# Patient Record
Sex: MALE | NOT HISPANIC OR LATINO | Employment: OTHER | URBAN - METROPOLITAN AREA
[De-identification: names, ages, dates, MRNs, and addresses within clinical notes are randomized per-mention and may not be internally consistent; named-entity substitution may affect disease eponyms.]

---

## 2024-10-29 DIAGNOSIS — I50.22 CHRONIC SYSTOLIC HEART FAILURE: Primary | ICD-10-CM

## 2024-11-04 ENCOUNTER — APPOINTMENT (OUTPATIENT)
Dept: CARDIOLOGY | Facility: HOSPITAL | Age: 74
End: 2024-11-04
Payer: MEDICARE

## 2024-11-04 ENCOUNTER — OFFICE VISIT (OUTPATIENT)
Dept: CARDIOLOGY | Facility: HOSPITAL | Age: 74
End: 2024-11-04
Payer: MEDICARE

## 2024-11-04 ENCOUNTER — HOSPITAL ENCOUNTER (OUTPATIENT)
Dept: CARDIOLOGY | Facility: HOSPITAL | Age: 74
Discharge: HOME | End: 2024-11-04
Payer: MEDICARE

## 2024-11-04 ENCOUNTER — LAB (OUTPATIENT)
Dept: LAB | Facility: LAB | Age: 74
End: 2024-11-04
Payer: COMMERCIAL

## 2024-11-04 ENCOUNTER — PREP FOR PROCEDURE (OUTPATIENT)
Dept: CARDIOLOGY | Facility: HOSPITAL | Age: 74
End: 2024-11-04

## 2024-11-04 VITALS
BODY MASS INDEX: 21.6 KG/M2 | DIASTOLIC BLOOD PRESSURE: 72 MMHG | SYSTOLIC BLOOD PRESSURE: 115 MMHG | WEIGHT: 137.6 LBS | HEIGHT: 67 IN | OXYGEN SATURATION: 98 % | HEART RATE: 59 BPM

## 2024-11-04 DIAGNOSIS — I50.9 CONGESTIVE HEART FAILURE, UNSPECIFIED HF CHRONICITY, UNSPECIFIED HEART FAILURE TYPE: Primary | ICD-10-CM

## 2024-11-04 DIAGNOSIS — Z95.5 S/P DRUG ELUTING CORONARY STENT PLACEMENT: ICD-10-CM

## 2024-11-04 DIAGNOSIS — I25.10 CORONARY ARTERY DISEASE INVOLVING NATIVE CORONARY ARTERY OF NATIVE HEART WITHOUT ANGINA PECTORIS: ICD-10-CM

## 2024-11-04 DIAGNOSIS — Z01.818 PRE-OP TESTING: ICD-10-CM

## 2024-11-04 DIAGNOSIS — I50.22 CHRONIC SYSTOLIC HEART FAILURE: Primary | ICD-10-CM

## 2024-11-04 DIAGNOSIS — I25.10 CAD (CORONARY ARTERY DISEASE): Primary | ICD-10-CM

## 2024-11-04 DIAGNOSIS — I50.22 CHRONIC SYSTOLIC HEART FAILURE: ICD-10-CM

## 2024-11-04 DIAGNOSIS — E78.2 MIXED HYPERLIPIDEMIA: ICD-10-CM

## 2024-11-04 DIAGNOSIS — I71.21 ANEURYSM OF ASCENDING AORTA WITHOUT RUPTURE (CMS-HCC): ICD-10-CM

## 2024-11-04 DIAGNOSIS — I73.9 PERIPHERAL ARTERY DISEASE (CMS-HCC): ICD-10-CM

## 2024-11-04 LAB
ANION GAP SERPL CALC-SCNC: 10 MMOL/L (ref 10–20)
AORTIC VALVE MEAN GRADIENT: 3 MMHG
AORTIC VALVE PEAK VELOCITY: 1.23 M/S
AV PEAK GRADIENT: 6 MMHG
AVA (PEAK VEL): 3.01 CM2
AVA (VTI): 2.9 CM2
BUN SERPL-MCNC: 19 MG/DL (ref 6–23)
CALCIUM SERPL-MCNC: 8.8 MG/DL (ref 8.6–10.3)
CHLORIDE SERPL-SCNC: 97 MMOL/L (ref 98–107)
CO2 SERPL-SCNC: 33 MMOL/L (ref 21–32)
CREAT SERPL-MCNC: 1.04 MG/DL (ref 0.5–1.3)
EGFRCR SERPLBLD CKD-EPI 2021: 76 ML/MIN/1.73M*2
EJECTION FRACTION APICAL 4 CHAMBER: 25.3
EJECTION FRACTION: 29 %
ERYTHROCYTE [DISTWIDTH] IN BLOOD BY AUTOMATED COUNT: 18.5 % (ref 11.5–14.5)
GLUCOSE SERPL-MCNC: 101 MG/DL (ref 74–99)
HCT VFR BLD AUTO: 33.4 % (ref 41–52)
HGB BLD-MCNC: 9.8 G/DL (ref 13.5–17.5)
LEFT ATRIUM VOLUME AREA LENGTH INDEX BSA: 54.2 ML/M2
LEFT VENTRICLE INTERNAL DIMENSION DIASTOLE: 5.69 CM (ref 3.5–6)
LEFT VENTRICULAR OUTFLOW TRACT DIAMETER: 2.2 CM
MCH RBC QN AUTO: 23.1 PG (ref 26–34)
MCHC RBC AUTO-ENTMCNC: 29.3 G/DL (ref 32–36)
MCV RBC AUTO: 79 FL (ref 80–100)
MITRAL VALVE E/A RATIO: 2.34
NRBC BLD-RTO: 0 /100 WBCS (ref 0–0)
PLATELET # BLD AUTO: 380 X10*3/UL (ref 150–450)
POTASSIUM SERPL-SCNC: 3.2 MMOL/L (ref 3.5–5.3)
RBC # BLD AUTO: 4.25 X10*6/UL (ref 4.5–5.9)
RIGHT VENTRICLE FREE WALL PEAK S': 13.5 CM/S
RIGHT VENTRICLE PEAK SYSTOLIC PRESSURE: 74.9 MMHG
SODIUM SERPL-SCNC: 137 MMOL/L (ref 136–145)
TRICUSPID ANNULAR PLANE SYSTOLIC EXCURSION: 2 CM
WBC # BLD AUTO: 5.3 X10*3/UL (ref 4.4–11.3)

## 2024-11-04 PROCEDURE — 93306 TTE W/DOPPLER COMPLETE: CPT

## 2024-11-04 PROCEDURE — 3008F BODY MASS INDEX DOCD: CPT | Performed by: INTERNAL MEDICINE

## 2024-11-04 PROCEDURE — 93010 ELECTROCARDIOGRAM REPORT: CPT | Performed by: INTERNAL MEDICINE

## 2024-11-04 PROCEDURE — 85027 COMPLETE CBC AUTOMATED: CPT

## 2024-11-04 PROCEDURE — 80048 BASIC METABOLIC PNL TOTAL CA: CPT

## 2024-11-04 PROCEDURE — 99214 OFFICE O/P EST MOD 30 MIN: CPT | Mod: 25 | Performed by: INTERNAL MEDICINE

## 2024-11-04 PROCEDURE — 1159F MED LIST DOCD IN RCRD: CPT | Performed by: INTERNAL MEDICINE

## 2024-11-04 PROCEDURE — 99204 OFFICE O/P NEW MOD 45 MIN: CPT | Performed by: INTERNAL MEDICINE

## 2024-11-04 PROCEDURE — 2500000004 HC RX 250 GENERAL PHARMACY W/ HCPCS (ALT 636 FOR OP/ED)

## 2024-11-04 RX ORDER — OMEPRAZOLE 40 MG/1
40 CAPSULE, DELAYED RELEASE ORAL DAILY
COMMUNITY
Start: 2024-07-30

## 2024-11-04 RX ORDER — FINASTERIDE 5 MG/1
5 TABLET, FILM COATED ORAL DAILY
COMMUNITY
Start: 2024-10-21

## 2024-11-04 RX ORDER — BISMUTH SUBSALICYLATE 262 MG
1 TABLET,CHEWABLE ORAL DAILY
COMMUNITY

## 2024-11-04 RX ORDER — ESCITALOPRAM OXALATE 20 MG/1
10 TABLET ORAL DAILY
COMMUNITY
Start: 2024-10-14

## 2024-11-04 RX ORDER — POTASSIUM CHLORIDE 750 MG/1
20 TABLET, EXTENDED RELEASE ORAL DAILY
COMMUNITY
Start: 2024-10-14 | End: 2024-11-09 | Stop reason: HOSPADM

## 2024-11-04 RX ORDER — BUDESONIDE 3 MG/1
3 CAPSULE, COATED PELLETS ORAL DAILY
COMMUNITY
Start: 2024-10-28 | End: 2024-11-09 | Stop reason: HOSPADM

## 2024-11-04 RX ORDER — ALPRAZOLAM 0.5 MG/1
0.5 TABLET ORAL 3 TIMES DAILY PRN
COMMUNITY
Start: 2024-09-26

## 2024-11-04 RX ORDER — WATER
100 LIQUID (ML) MISCELLANEOUS
Status: CANCELLED | OUTPATIENT
Start: 2024-11-04

## 2024-11-04 RX ORDER — CLOPIDOGREL BISULFATE 75 MG/1
75 TABLET ORAL DAILY
COMMUNITY
Start: 2024-08-28

## 2024-11-04 RX ORDER — ACETAMINOPHEN 500 MG
2000 TABLET ORAL DAILY
COMMUNITY

## 2024-11-04 RX ORDER — CALCIUM CARBONATE 200(500)MG
1 TABLET,CHEWABLE ORAL DAILY
Status: ON HOLD | COMMUNITY
End: 2024-11-07 | Stop reason: WASHOUT

## 2024-11-04 RX ORDER — ATORVASTATIN CALCIUM 80 MG/1
80 TABLET, FILM COATED ORAL DAILY
COMMUNITY
Start: 2024-10-14

## 2024-11-04 RX ORDER — NITROGLYCERIN 0.4 MG/1
1 TABLET SUBLINGUAL EVERY 5 MIN PRN
COMMUNITY
Start: 2024-08-14

## 2024-11-04 RX ORDER — METOPROLOL SUCCINATE 25 MG/1
25 TABLET, EXTENDED RELEASE ORAL DAILY
COMMUNITY
End: 2024-11-09 | Stop reason: HOSPADM

## 2024-11-04 RX ORDER — FAMOTIDINE 40 MG/1
40 TABLET, FILM COATED ORAL DAILY
COMMUNITY
Start: 2024-10-07

## 2024-11-04 RX ORDER — ACETAMINOPHEN 500 MG
5 TABLET ORAL NIGHTLY
COMMUNITY

## 2024-11-04 RX ORDER — TAMSULOSIN HYDROCHLORIDE 0.4 MG/1
0.4 CAPSULE ORAL DAILY
COMMUNITY
Start: 2024-10-28

## 2024-11-04 NOTE — PROGRESS NOTES
Referred by brother Jose Waite   for Class III HF symptoms after PCI c/b LM-LAD dissection and MI at [place], [place].     History Of Present Illness:    Anurag Waite is a 74 y.o. male presenting for evaluation of Class 3 HF symptoms after LCX PCI attempt c/b LM-LAD dissection, MI, VT, cardiogenic shock.    PCP: Carmen Waite  Cardiologist: Carolee Brandt (CNP),     CRF: Hyperlipidemia, 55 pack-year smoking quit 7/2024, positive family history CAD/CABG (brother) and father MI x 2. Negative for DM or hypertension.    Complex prior CV history including PAD  and RLE claudication s/p femoral artery endarterectomy c/b large hematoma requiring re-exploration and repair.    History of ascending aortic aneurysm 4.0 cm by CT.    PCP performed CAC score for 10 year CV risk assessment >1500 consistent with high 10-year CV risk death, MI, stroke = 25%.    Referred for cardiac cath. August 2024: PCI of LCX  (2.25 x 16) c/b LM-LAD dissection requiring stent LM  (3.5 x 8), LM-LAD (3.5 x 48), LAD (3.25 x 8) with cardiogenic shock, IABP, VT and intubation for 24 hours. Post-PCI echo showed LVEF 40-45% with apical HK. Recurrent visits for HFrEF with torsemide 20 to 40 mg and metoprolol ER 25 mg only. No spironolactone, Entresto/ACEI/ARB or SGLT-2 inhibitors.    Presently, Class 3 from HFrEF symptom standpoint with SOB showering and dressing. Able to walk < 100 yards before SOB. Denies othopnea, PND. Pedal edema resolved with torsemide 20 to 40 mg daily. Denies angina, chest pain, palpitations, rapid heart beat, syncope/pre-syncope. Compliant with DAPT.     NKDA    Past surgical history: multiple hernia repairs and s/p right common femoral/profunda endarterectomy.    Past Medical History:  He has no past medical history on file.    Past Surgical History:  He has no past surgical history on file.      Social History:  He reports that he quit smoking about 3 months ago. His smoking use included  "cigarettes. He has never used smokeless tobacco. He reports current alcohol use of about 14.0 standard drinks of alcohol per week. He reports that he does not use drugs. 55 pack year smoking history.    Family History:  No family history on file.     Allergies:  Patient has no known allergies.    Outpatient Medications:  Current Outpatient Medications   Medication Instructions    ALPRAZolam (XANAX) 0.5 mg, 3 times daily PRN    atorvastatin (LIPITOR) 80 mg, Daily    budesonide EC (ENTOCORT EC) 3 mg, Daily    calcium carbonate (Tums) 200 mg calcium chewable tablet 1 tablet, Daily    cholecalciferol, vitamin D3, 25 mcg (1,000 unit) tablet,chewable 2 tablets, Daily    clopidogrel (PLAVIX) 75 mg, Daily    escitalopram (LEXAPRO) 10 mg, Daily    famotidine (PEPCID) 40 mg, Daily    finasteride (PROSCAR) 5 mg, Daily    Klor-Con M10 10 mEq ER tablet 10 mEq, Daily    melatonin 5 mg, Daily    metoprolol succinate XL (TOPROL-XL) 12.5 mg, 2 times daily    multivitamin tablet 1 tablet, Daily    nitroglycerin (Nitrostat) 0.4 mg SL tablet 1 tablet    omeprazole (PRILOSEC) 40 mg, Daily    tamsulosin (FLOMAX) 0.4 mg, Daily    torsemide (DEMADEX) 20 mg, Daily        Last Recorded Vitals:  Vitals:    11/04/24 0929   BP: 115/72   BP Location: Right arm   Patient Position: Sitting   BP Cuff Size: Small adult   Pulse: 59   SpO2: 98%   Weight: 62.4 kg (137 lb 9.6 oz)   Height: 1.702 m (5' 7\")       Physical Exam:  Skin: No rash.     HEENT: Non-palpable thyroid.     Lungs: Bibasilar rales.    Cardiac: JVP 8. PMI non-displaced. S1, S2. No S3. No S4. I/VI RUSSELL.     Abdomen: No hepatosplenomegaly. No abdominal bruit. Normal aortic impulse.    Extremities: No edema.    Pulses: Carotid 2+ bilateral. No carotid bruits. Radial 2+ bilateral. Femoral 2+ right, 2+ left. Popliteal 1+ bilateral. Posterior tibial 1+ bilateral.    Neuro: Non-focal. Normal gait.       Last Labs:  CBC -  No results found for: \"WBC\", \"HGB\", \"HCT\", \"MCV\", \"PLT\"    CMP -  No " "results found for: \"CALCIUM\", \"PHOS\", \"PROT\", \"ALBUMIN\", \"AST\", \"ALT\", \"ALKPHOS\", \"BILITOT\"    LIPID PANEL -   No results found for: \"CHOL\", \"HDL\", \"CHHDL\", \"LDL\", \"VLDL\", \"TRIG\", \"NHDL\"    RENAL FUNCTION PANEL -   No results found for: \"GLU\", \"K\", \"CHLOR\", \"PHOS\"    No results found for: \"BNP\", \"HGBA1C\"    Last Cardiology Tests:  ECG:  Review of the ECG by me shows sinus bradycardia. There is poor R-wave progression and anterolateral deep T-wave c/w known prior AMI complicating PCI, cannot rule out anterolateral ischemia.   Echo:  Scheduled for today post-visit.    Assessment/Plan   74 year-old man with multiple CRF presents for second opinion regarding Class III heart failure.  Complicated recent CV course with  August 2024 PCI of LCX  (2.25 x 16) c/b LM-LAD dissection requiring stent LM  (3.5 x 8), LM-LAD (3.5 x 48), LAD (3.25 x 8) with cardiogenic shock, IABP, VT and intubation for 24 hours. Post-PCI echo showed LVEF 40-45% with apical HK. Recurrent visits for HFrEF with torsemide 20 to 40 mg and metoprolol ER 25 mg only. No spironolactone, Entresto/ACEI/ARB or SGLT-2 inhibitors. Will require comprehensive HfrEF assessment now including TTE, right and left heart cath and admission for institution of GDMT with SGLT-2, Entresto and spironolactone. Reviewed PCI CD. Ostial LCX jailed with ostial LCX stensois, loss of ramus/high OM that fills via left-to-left collaterals. LM-LAD with good stent result. Dr. Sousa will cath Nov 6 and then admit to HF Service. Will follow-up with results of echo later today. Dr. Sousa will do telehealth visit tomorrow.      Abel Gutierrez MD   "

## 2024-11-04 NOTE — H&P (VIEW-ONLY)
Referred by brother Jose Waite   for Class III HF symptoms after PCI c/b LM-LAD dissection and MI at [place], [place].     History Of Present Illness:    Anurag Waite is a 74 y.o. male presenting for evaluation of Class 3 HF symptoms after LCX PCI attempt c/b LM-LAD dissection, MI, VT, cardiogenic shock.    PCP: Carmen Waite  Cardiologist: Carolee Brandt (CNP),     CRF: Hyperlipidemia, 55 pack-year smoking quit 7/2024, positive family history CAD/CABG (brother) and father MI x 2. Negative for DM or hypertension.    Complex prior CV history including PAD  and RLE claudication s/p femoral artery endarterectomy c/b large hematoma requiring re-exploration and repair.    History of ascending aortic aneurysm 4.0 cm by CT.    PCP performed CAC score for 10 year CV risk assessment >1500 consistent with high 10-year CV risk death, MI, stroke = 25%.    Referred for cardiac cath. August 2024: PCI of LCX  (2.25 x 16) c/b LM-LAD dissection requiring stent LM  (3.5 x 8), LM-LAD (3.5 x 48), LAD (3.25 x 8) with cardiogenic shock, IABP, VT and intubation for 24 hours. Post-PCI echo showed LVEF 40-45% with apical HK. Recurrent visits for HFrEF with torsemide 20 to 40 mg and metoprolol ER 25 mg only. No spironolactone, Entresto/ACEI/ARB or SGLT-2 inhibitors.    Presently, Class 3 from HFrEF symptom standpoint with SOB showering and dressing. Able to walk < 100 yards before SOB. Denies othopnea, PND. Pedal edema resolved with torsemide 20 to 40 mg daily. Denies angina, chest pain, palpitations, rapid heart beat, syncope/pre-syncope. Compliant with DAPT.     NKDA    Past surgical history: multiple hernia repairs and s/p right common femoral/profunda endarterectomy.    Past Medical History:  He has no past medical history on file.    Past Surgical History:  He has no past surgical history on file.      Social History:  He reports that he quit smoking about 3 months ago. His smoking use included  "cigarettes. He has never used smokeless tobacco. He reports current alcohol use of about 14.0 standard drinks of alcohol per week. He reports that he does not use drugs. 55 pack year smoking history.    Family History:  No family history on file.     Allergies:  Patient has no known allergies.    Outpatient Medications:  Current Outpatient Medications   Medication Instructions    ALPRAZolam (XANAX) 0.5 mg, 3 times daily PRN    atorvastatin (LIPITOR) 80 mg, Daily    budesonide EC (ENTOCORT EC) 3 mg, Daily    calcium carbonate (Tums) 200 mg calcium chewable tablet 1 tablet, Daily    cholecalciferol, vitamin D3, 25 mcg (1,000 unit) tablet,chewable 2 tablets, Daily    clopidogrel (PLAVIX) 75 mg, Daily    escitalopram (LEXAPRO) 10 mg, Daily    famotidine (PEPCID) 40 mg, Daily    finasteride (PROSCAR) 5 mg, Daily    Klor-Con M10 10 mEq ER tablet 10 mEq, Daily    melatonin 5 mg, Daily    metoprolol succinate XL (TOPROL-XL) 12.5 mg, 2 times daily    multivitamin tablet 1 tablet, Daily    nitroglycerin (Nitrostat) 0.4 mg SL tablet 1 tablet    omeprazole (PRILOSEC) 40 mg, Daily    tamsulosin (FLOMAX) 0.4 mg, Daily    torsemide (DEMADEX) 20 mg, Daily        Last Recorded Vitals:  Vitals:    11/04/24 0929   BP: 115/72   BP Location: Right arm   Patient Position: Sitting   BP Cuff Size: Small adult   Pulse: 59   SpO2: 98%   Weight: 62.4 kg (137 lb 9.6 oz)   Height: 1.702 m (5' 7\")       Physical Exam:  Skin: No rash.     HEENT: Non-palpable thyroid.     Lungs: Bibasilar rales.    Cardiac: JVP 8. PMI non-displaced. S1, S2. No S3. No S4. I/VI RUSSELL.     Abdomen: No hepatosplenomegaly. No abdominal bruit. Normal aortic impulse.    Extremities: No edema.    Pulses: Carotid 2+ bilateral. No carotid bruits. Radial 2+ bilateral. Femoral 2+ right, 2+ left. Popliteal 1+ bilateral. Posterior tibial 1+ bilateral.    Neuro: Non-focal. Normal gait.       Last Labs:  CBC -  No results found for: \"WBC\", \"HGB\", \"HCT\", \"MCV\", \"PLT\"    CMP -  No " "results found for: \"CALCIUM\", \"PHOS\", \"PROT\", \"ALBUMIN\", \"AST\", \"ALT\", \"ALKPHOS\", \"BILITOT\"    LIPID PANEL -   No results found for: \"CHOL\", \"HDL\", \"CHHDL\", \"LDL\", \"VLDL\", \"TRIG\", \"NHDL\"    RENAL FUNCTION PANEL -   No results found for: \"GLU\", \"K\", \"CHLOR\", \"PHOS\"    No results found for: \"BNP\", \"HGBA1C\"    Last Cardiology Tests:  ECG:  Review of the ECG by me shows sinus bradycardia. There is poor R-wave progression and anterolateral deep T-wave c/w known prior AMI complicating PCI, cannot rule out anterolateral ischemia.   Echo:  Scheduled for today post-visit.    Assessment/Plan   74 year-old man with multiple CRF presents for second opinion regarding Class III heart failure.  Complicated recent CV course with  August 2024 PCI of LCX  (2.25 x 16) c/b LM-LAD dissection requiring stent LM  (3.5 x 8), LM-LAD (3.5 x 48), LAD (3.25 x 8) with cardiogenic shock, IABP, VT and intubation for 24 hours. Post-PCI echo showed LVEF 40-45% with apical HK. Recurrent visits for HFrEF with torsemide 20 to 40 mg and metoprolol ER 25 mg only. No spironolactone, Entresto/ACEI/ARB or SGLT-2 inhibitors. Will require comprehensive HfrEF assessment now including TTE, right and left heart cath and admission for institution of GDMT with SGLT-2, Entresto and spironolactone. Reviewed PCI CD. Ostial LCX jailed with ostial LCX stensois, loss of ramus/high OM that fills via left-to-left collaterals. LM-LAD with good stent result. Dr. Sousa will cath Nov 6 and then admit to HF Service. Will follow-up with results of echo later today. Dr. Sousa will do telehealth visit tomorrow.      Abel Gutierrez MD   "

## 2024-11-05 ENCOUNTER — APPOINTMENT (OUTPATIENT)
Dept: CARDIOLOGY | Facility: CLINIC | Age: 74
End: 2024-11-05
Payer: MEDICARE

## 2024-11-05 ENCOUNTER — TELEMEDICINE (OUTPATIENT)
Dept: CARDIOLOGY | Facility: CLINIC | Age: 74
End: 2024-11-05
Payer: MEDICARE

## 2024-11-05 DIAGNOSIS — I50.23 ACUTE ON CHRONIC SYSTOLIC HEART FAILURE: ICD-10-CM

## 2024-11-05 DIAGNOSIS — I25.10 CORONARY ARTERY DISEASE INVOLVING NATIVE CORONARY ARTERY OF NATIVE HEART WITHOUT ANGINA PECTORIS: Primary | ICD-10-CM

## 2024-11-05 PROCEDURE — 99214 OFFICE O/P EST MOD 30 MIN: CPT | Performed by: INTERNAL MEDICINE

## 2024-11-05 PROCEDURE — 1159F MED LIST DOCD IN RCRD: CPT | Performed by: INTERNAL MEDICINE

## 2024-11-05 NOTE — PROGRESS NOTES
Virtual or Telephone Consent    An interactive audio and video telecommunication system which permits real time communications between the patient (at the originating site) and provider (at the distant site) was utilized to provide this telehealth service.   Verbal consent was requested and obtained from Anurag Waite on this date, 11/05/24 for a telehealth visit.     Referred by Dr. Abel Gutirerez for Left and right heart cath     History Of Present Illness:    Anurag Waite is a 74 y.o. male presenting virtually for pre-procedure evaluation. Pt has a h/o AAA (4 cm), PAD/RLE claudication s/p femoral artery endarterectomy, HF class 3 (not on GDMT), CAD s/p LCX PCI attempt c/b LM-LAD dissection, MI, VT, cardiogenic shock at [facility] in [place], [place].     Per chart review:   PCP performed CAC score for 10 year CV risk assessment >1500 consistent with high 10-year CV risk death, MI, stroke = 25%.   OSH August 2024: PCI of LCX (2.25 x 16) c/b LM-LAD dissection requiring stent LM (3.5 x 8), LM-LAD (3.5 x 48), LAD (3.25 x 8) with cardiogenic shock, IABP, VT and intubation for 24 hours. Post-PCI echo showed LVEF 40-45% with apical HK. Recurrent visits for HFrEF with torsemide 20 to 40 mg and metoprolol ER 25 mg only. No spironolactone, Entresto/ACEI/ARB or SGLT-2 inhibitors.   Per review of the OSH PCI images show Ostial LCX jailed with ostial LCX stensois, loss of ramus/high OM that fills via left-to-left collaterals. LM-LAD with good stent result.     Pt scheduled for right and left heart cath tomorrow to assess his HF and to evaluate the stents placed. Pt seeing today virtually for pre-procedure evaluation. He reports feeling fatigued often and SOB at rest and activity that comes and goes. Pt denied chest pain, palpitation and dizziness. He reported being complaint with his  medications including ASA, Plavix and statin therapy.         Past Medical History:  He has no past medical history on file.    Past  "Surgical History:  He has no past surgical history on file.      Social History:  55 pack-year smoking quit 7/2024,   He reports that he quit smoking about 3 months ago. His smoking use included cigarettes. He has never used smokeless tobacco. He reports current alcohol use of about 14.0 standard drinks of alcohol per week. He reports that he does not use drugs.    Family History:  Positive cardiac family history HLD, CAD/CABG (brother) and father MI x 2      Allergies:  Patient has no known allergies.    Outpatient Medications:  Current Outpatient Medications   Medication Instructions    ALPRAZolam (XANAX) 0.5 mg, 3 times daily PRN    atorvastatin (LIPITOR) 80 mg, Daily    budesonide EC (ENTOCORT EC) 3 mg, Daily    calcium carbonate (Tums) 200 mg calcium chewable tablet 1 tablet, Daily    cholecalciferol, vitamin D3, 25 mcg (1,000 unit) tablet,chewable 2 tablets, Daily    clopidogrel (PLAVIX) 75 mg, Daily    escitalopram (LEXAPRO) 10 mg, Daily    famotidine (PEPCID) 40 mg, Daily    finasteride (PROSCAR) 5 mg, Daily    Klor-Con M10 10 mEq ER tablet 10 mEq, Daily    melatonin 5 mg, Daily    metoprolol succinate XL (TOPROL-XL) 12.5 mg, 2 times daily    multivitamin tablet 1 tablet, Daily    nitroglycerin (Nitrostat) 0.4 mg SL tablet 1 tablet    omeprazole (PRILOSEC) 40 mg, Daily    tamsulosin (FLOMAX) 0.4 mg, Daily    torsemide (DEMADEX) 20 mg, Daily        Last Recorded Vitals:  There were no vitals filed for this visit.    Physical Exam:  AOx3, NAD  Appropriate mood and behavior  Breathing unlabored,  BLAKE  Skin: No discolorations/central cyanosis noted, no trauma seen on the face      Last Labs:  CBC -  Lab Results   Component Value Date    WBC 5.3 11/04/2024    HGB 9.8 (L) 11/04/2024    HCT 33.4 (L) 11/04/2024    MCV 79 (L) 11/04/2024     11/04/2024       CMP -  Lab Results   Component Value Date    CALCIUM 8.8 11/04/2024       LIPID PANEL -   No results found for: \"CHOL\", \"TRIG\", \"HDL\", \"CHHDL\", \"LDLF\", " "\"VLDL\", \"NHDL\"    RENAL FUNCTION PANEL -   Lab Results   Component Value Date    GLUCOSE 101 (H) 11/04/2024     11/04/2024    K 3.2 (L) 11/04/2024    CL 97 (L) 11/04/2024    CO2 33 (H) 11/04/2024    ANIONGAP 10 11/04/2024    BUN 19 11/04/2024    CREATININE 1.04 11/04/2024    CALCIUM 8.8 11/04/2024        No results found for: \"BNP\", \"HGBA1C\"    Last Cardiology Tests:    Echo:  Transthoracic Echo Complete 11/04/2024  Ejection Fractions:  EF   Date/Time Value Ref Range Status   11/04/2024 11:47 AM 29 %      CONCLUSIONS:   1. Left ventricular ejection fraction is severely decreased, calculated by Mora's biplane at 29%.   2. Multiple segmental abnormalities exist. See findings.   3. There are multiple left ventricular wall motion abnormalities.   4. Spectral Doppler shows a Grade III (restrictive pattern) of left ventricular diastolic filling with an elevated left atrial pressure.   5. Left ventricular cavity size is severely dilated.   6. No left ventricular thrombus visualized.   7. There is normal right ventricular global systolic function.   8. Moderately enlarged right ventricle.   9. The left atrium is severely dilated.  10. The right atrium is severely dilated.  11. Mild to moderate mitral valve regurgitation.  12. Mild to moderate tricuspid regurgitation visualized.  13. Severely elevated right ventricular systolic pressure.  14. Mild to moderate aortic valve regurgitation.        Assessment/Plan     74 year old male with h/o AAA, PAD and symptomatic Class III HF after recent PCI in 8/2024 c/b LM-LAD dissection and MI at [place], [place]. Pt was recently seen by Dr. Gutierrez noted to not on GDMT (No spironolactone, Entresto/ACEI/ARB or SGLT-2 inhibitors) for his HF. ECHO showed EF 29% which is a decline from the OSH of 40-45%. Pt reports progressively getting worsening SOB, fatigue and some edema. He denied chest pain or palpitation. Pt was recommended to have right and left hearth " cath by Dr. Gutierrez as part of the comprehensive assessment for HFrEF.       Plan  - Scheduled for Right and left heart cath on 11/6/2024 to assess right heart pressure given very low EF of 29% and assess bifurcating LM stents.   - post cath depending on finding plan for hospital admission to heart failure service for further comprehensive management   - The risks, benefits, and alternatives of procedure was discussed at length with the patient and family and he is agreeable to proceed.   - Pre-procedure instructions and medications discussed with the patient. He verbalized understanding.   - Pt advised to take extra dose of potassium for K of 3.2.     Lobo Sousa DO

## 2024-11-06 ENCOUNTER — HOSPITAL ENCOUNTER (INPATIENT)
Facility: HOSPITAL | Age: 74
LOS: 3 days | Discharge: HOME | End: 2024-11-09
Attending: INTERNAL MEDICINE
Payer: MEDICARE

## 2024-11-06 ENCOUNTER — APPOINTMENT (OUTPATIENT)
Dept: RADIOLOGY | Facility: HOSPITAL | Age: 74
End: 2024-11-06
Payer: MEDICARE

## 2024-11-06 ENCOUNTER — APPOINTMENT (OUTPATIENT)
Dept: CARDIOLOGY | Facility: HOSPITAL | Age: 74
End: 2024-11-06
Payer: MEDICARE

## 2024-11-06 DIAGNOSIS — Z87.891 FORMER SMOKER: ICD-10-CM

## 2024-11-06 DIAGNOSIS — I73.9 PAD (PERIPHERAL ARTERY DISEASE) (CMS-HCC): ICD-10-CM

## 2024-11-06 DIAGNOSIS — I42.9 CARDIOMYOPATHY, UNSPECIFIED: ICD-10-CM

## 2024-11-06 DIAGNOSIS — R19.7 DIARRHEA: ICD-10-CM

## 2024-11-06 DIAGNOSIS — I25.10 CAD (CORONARY ARTERY DISEASE): Primary | ICD-10-CM

## 2024-11-06 DIAGNOSIS — I25.5 CARDIOMYOPATHY, ISCHEMIC: ICD-10-CM

## 2024-11-06 DIAGNOSIS — I50.41 ACUTE COMBINED SYSTOLIC AND DIASTOLIC HEART FAILURE: Chronic | ICD-10-CM

## 2024-11-06 PROBLEM — I25.2 MYOCARDIAL INFARCT, OLD: Status: ACTIVE | Noted: 2024-11-06

## 2024-11-06 PROBLEM — I47.20 VENTRICULAR TACHYCARDIA (MULTI): Status: ACTIVE | Noted: 2024-11-06

## 2024-11-06 PROBLEM — I71.21 ASCENDING AORTIC ANEURYSM (CMS-HCC): Status: ACTIVE | Noted: 2024-11-06

## 2024-11-06 PROBLEM — I50.9 HEART FAILURE: Status: ACTIVE | Noted: 2024-11-06

## 2024-11-06 LAB
ABO GROUP (TYPE) IN BLOOD: NORMAL
ALBUMIN SERPL BCP-MCNC: 3.5 G/DL (ref 3.4–5)
ALP SERPL-CCNC: 77 U/L (ref 33–136)
ALT SERPL W P-5'-P-CCNC: 33 U/L (ref 10–52)
ANION GAP SERPL CALC-SCNC: 15 MMOL/L (ref 10–20)
ANTIBODY SCREEN: NORMAL
APTT PPP: 89 SECONDS (ref 27–38)
AST SERPL W P-5'-P-CCNC: 29 U/L (ref 9–39)
BASOPHILS # BLD AUTO: 0.04 X10*3/UL (ref 0–0.1)
BASOPHILS NFR BLD AUTO: 0.6 %
BILIRUB DIRECT SERPL-MCNC: 0.1 MG/DL (ref 0–0.3)
BILIRUB SERPL-MCNC: 0.6 MG/DL (ref 0–1.2)
BNP SERPL-MCNC: 981 PG/ML (ref 0–99)
BUN SERPL-MCNC: 16 MG/DL (ref 6–23)
CALCIUM SERPL-MCNC: 8.8 MG/DL (ref 8.6–10.6)
CARDIAC TROPONIN I PNL SERPL HS: 27 NG/L (ref 0–53)
CHLORIDE SERPL-SCNC: 96 MMOL/L (ref 98–107)
CO2 SERPL-SCNC: 28 MMOL/L (ref 21–32)
CREAT SERPL-MCNC: 1.02 MG/DL (ref 0.5–1.3)
EGFRCR SERPLBLD CKD-EPI 2021: 78 ML/MIN/1.73M*2
EOSINOPHIL # BLD AUTO: 0.15 X10*3/UL (ref 0–0.4)
EOSINOPHIL NFR BLD AUTO: 2.3 %
ERYTHROCYTE [DISTWIDTH] IN BLOOD BY AUTOMATED COUNT: 18.6 % (ref 11.5–14.5)
EST. AVERAGE GLUCOSE BLD GHB EST-MCNC: 123 MG/DL
FERRITIN SERPL-MCNC: 50 NG/ML (ref 20–300)
FOLATE SERPL-MCNC: >24 NG/ML
GLUCOSE SERPL-MCNC: 94 MG/DL (ref 74–99)
HBA1C MFR BLD: 5.9 %
HCT VFR BLD AUTO: 31.6 % (ref 41–52)
HGB BLD-MCNC: 9.5 G/DL (ref 13.5–17.5)
IMM GRANULOCYTES # BLD AUTO: 0.03 X10*3/UL (ref 0–0.5)
IMM GRANULOCYTES NFR BLD AUTO: 0.5 % (ref 0–0.9)
INR PPP: 1.2 (ref 0.9–1.1)
IRON SATN MFR SERPL: 7 % (ref 25–45)
IRON SERPL-MCNC: 28 UG/DL (ref 35–150)
LACTATE SERPL-SCNC: 0.8 MMOL/L (ref 0.4–2)
LYMPHOCYTES # BLD AUTO: 1.38 X10*3/UL (ref 0.8–3)
LYMPHOCYTES NFR BLD AUTO: 21.1 %
MAGNESIUM SERPL-MCNC: 1.7 MG/DL (ref 1.6–2.4)
MCH RBC QN AUTO: 24.2 PG (ref 26–34)
MCHC RBC AUTO-ENTMCNC: 30.1 G/DL (ref 32–36)
MCV RBC AUTO: 81 FL (ref 80–100)
MONOCYTES # BLD AUTO: 0.51 X10*3/UL (ref 0.05–0.8)
MONOCYTES NFR BLD AUTO: 7.8 %
NEUTROPHILS # BLD AUTO: 4.44 X10*3/UL (ref 1.6–5.5)
NEUTROPHILS NFR BLD AUTO: 67.7 %
NRBC BLD-RTO: 0 /100 WBCS (ref 0–0)
PHOSPHATE SERPL-MCNC: 3.4 MG/DL (ref 2.5–4.9)
PLATELET # BLD AUTO: 361 X10*3/UL (ref 150–450)
POTASSIUM SERPL-SCNC: 3.3 MMOL/L (ref 3.5–5.3)
PROT SERPL-MCNC: 7.5 G/DL (ref 6.4–8.2)
PROTHROMBIN TIME: 13.7 SECONDS (ref 9.8–12.8)
RBC # BLD AUTO: 3.92 X10*6/UL (ref 4.5–5.9)
RH FACTOR (ANTIGEN D): NORMAL
SODIUM SERPL-SCNC: 136 MMOL/L (ref 136–145)
TIBC SERPL-MCNC: 394 UG/DL (ref 240–445)
TSH SERPL-ACNC: 1.24 MIU/L (ref 0.44–3.98)
UIBC SERPL-MCNC: 366 UG/DL (ref 110–370)
VIT B12 SERPL-MCNC: 1070 PG/ML (ref 211–911)
WBC # BLD AUTO: 6.6 X10*3/UL (ref 4.4–11.3)

## 2024-11-06 PROCEDURE — 99291 CRITICAL CARE FIRST HOUR: CPT | Performed by: INTERNAL MEDICINE

## 2024-11-06 PROCEDURE — 83735 ASSAY OF MAGNESIUM: CPT

## 2024-11-06 PROCEDURE — 85610 PROTHROMBIN TIME: CPT

## 2024-11-06 PROCEDURE — 93005 ELECTROCARDIOGRAM TRACING: CPT

## 2024-11-06 PROCEDURE — 2500000002 HC RX 250 W HCPCS SELF ADMINISTERED DRUGS (ALT 637 FOR MEDICARE OP, ALT 636 FOR OP/ED)

## 2024-11-06 PROCEDURE — 82728 ASSAY OF FERRITIN: CPT

## 2024-11-06 PROCEDURE — 99291 CRITICAL CARE FIRST HOUR: CPT

## 2024-11-06 PROCEDURE — 86901 BLOOD TYPING SEROLOGIC RH(D): CPT

## 2024-11-06 PROCEDURE — 84484 ASSAY OF TROPONIN QUANT: CPT

## 2024-11-06 PROCEDURE — 02HQ32Z INSERTION OF MONITORING DEVICE INTO RIGHT PULMONARY ARTERY, PERCUTANEOUS APPROACH: ICD-10-PCS | Performed by: INTERNAL MEDICINE

## 2024-11-06 PROCEDURE — 96373 THER/PROPH/DIAG INJ IA: CPT | Performed by: INTERNAL MEDICINE

## 2024-11-06 PROCEDURE — 82248 BILIRUBIN DIRECT: CPT

## 2024-11-06 PROCEDURE — C1894 INTRO/SHEATH, NON-LASER: HCPCS | Performed by: INTERNAL MEDICINE

## 2024-11-06 PROCEDURE — 93456 R HRT CORONARY ARTERY ANGIO: CPT | Performed by: INTERNAL MEDICINE

## 2024-11-06 PROCEDURE — 93010 ELECTROCARDIOGRAM REPORT: CPT | Performed by: INTERNAL MEDICINE

## 2024-11-06 PROCEDURE — B2111ZZ FLUOROSCOPY OF MULTIPLE CORONARY ARTERIES USING LOW OSMOLAR CONTRAST: ICD-10-PCS | Performed by: INTERNAL MEDICINE

## 2024-11-06 PROCEDURE — 4A023N8 MEASUREMENT OF CARDIAC SAMPLING AND PRESSURE, BILATERAL, PERCUTANEOUS APPROACH: ICD-10-PCS | Performed by: INTERNAL MEDICINE

## 2024-11-06 PROCEDURE — 2780000003 HC OR 278 NO HCPCS: Performed by: INTERNAL MEDICINE

## 2024-11-06 PROCEDURE — 2500000001 HC RX 250 WO HCPCS SELF ADMINISTERED DRUGS (ALT 637 FOR MEDICARE OP)

## 2024-11-06 PROCEDURE — C1751 CATH, INF, PER/CENT/MIDLINE: HCPCS | Performed by: INTERNAL MEDICINE

## 2024-11-06 PROCEDURE — 4A1239Z MONITORING OF CARDIAC OUTPUT, PERCUTANEOUS APPROACH: ICD-10-PCS | Performed by: INTERNAL MEDICINE

## 2024-11-06 PROCEDURE — 83880 ASSAY OF NATRIURETIC PEPTIDE: CPT

## 2024-11-06 PROCEDURE — 83605 ASSAY OF LACTIC ACID: CPT

## 2024-11-06 PROCEDURE — 2720000007 HC OR 272 NO HCPCS: Performed by: INTERNAL MEDICINE

## 2024-11-06 PROCEDURE — 85025 COMPLETE CBC W/AUTO DIFF WBC: CPT

## 2024-11-06 PROCEDURE — 83540 ASSAY OF IRON: CPT

## 2024-11-06 PROCEDURE — 84100 ASSAY OF PHOSPHORUS: CPT

## 2024-11-06 PROCEDURE — 2020000001 HC ICU ROOM DAILY

## 2024-11-06 PROCEDURE — 99153 MOD SED SAME PHYS/QHP EA: CPT | Performed by: INTERNAL MEDICINE

## 2024-11-06 PROCEDURE — 2500000001 HC RX 250 WO HCPCS SELF ADMINISTERED DRUGS (ALT 637 FOR MEDICARE OP): Performed by: NURSE PRACTITIONER

## 2024-11-06 PROCEDURE — C1760 CLOSURE DEV, VASC: HCPCS | Performed by: INTERNAL MEDICINE

## 2024-11-06 PROCEDURE — 71045 X-RAY EXAM CHEST 1 VIEW: CPT | Performed by: RADIOLOGY

## 2024-11-06 PROCEDURE — 71045 X-RAY EXAM CHEST 1 VIEW: CPT

## 2024-11-06 PROCEDURE — 80053 COMPREHEN METABOLIC PANEL: CPT

## 2024-11-06 PROCEDURE — 83036 HEMOGLOBIN GLYCOSYLATED A1C: CPT

## 2024-11-06 PROCEDURE — 76937 US GUIDE VASCULAR ACCESS: CPT | Performed by: INTERNAL MEDICINE

## 2024-11-06 PROCEDURE — 99152 MOD SED SAME PHYS/QHP 5/>YRS: CPT | Performed by: INTERNAL MEDICINE

## 2024-11-06 PROCEDURE — 83550 IRON BINDING TEST: CPT

## 2024-11-06 PROCEDURE — 36415 COLL VENOUS BLD VENIPUNCTURE: CPT

## 2024-11-06 PROCEDURE — 2500000004 HC RX 250 GENERAL PHARMACY W/ HCPCS (ALT 636 FOR OP/ED): Performed by: NURSE PRACTITIONER

## 2024-11-06 PROCEDURE — 86850 RBC ANTIBODY SCREEN: CPT

## 2024-11-06 PROCEDURE — 93460 R&L HRT ART/VENTRICLE ANGIO: CPT | Performed by: INTERNAL MEDICINE

## 2024-11-06 PROCEDURE — 82607 VITAMIN B-12: CPT

## 2024-11-06 PROCEDURE — C1887 CATHETER, GUIDING: HCPCS | Performed by: INTERNAL MEDICINE

## 2024-11-06 PROCEDURE — 82746 ASSAY OF FOLIC ACID SERUM: CPT

## 2024-11-06 PROCEDURE — 2500000004 HC RX 250 GENERAL PHARMACY W/ HCPCS (ALT 636 FOR OP/ED): Performed by: INTERNAL MEDICINE

## 2024-11-06 PROCEDURE — 84443 ASSAY THYROID STIM HORMONE: CPT

## 2024-11-06 PROCEDURE — 4A133B3 MONITORING OF ARTERIAL PRESSURE, PULMONARY, PERCUTANEOUS APPROACH: ICD-10-PCS | Performed by: INTERNAL MEDICINE

## 2024-11-06 RX ORDER — ACETAMINOPHEN 325 MG/1
650 TABLET ORAL EVERY 6 HOURS PRN
Status: DISCONTINUED | OUTPATIENT
Start: 2024-11-06 | End: 2024-11-09 | Stop reason: HOSPADM

## 2024-11-06 RX ORDER — FINASTERIDE 5 MG/1
5 TABLET, FILM COATED ORAL DAILY
Status: DISCONTINUED | OUTPATIENT
Start: 2024-11-07 | End: 2024-11-09 | Stop reason: HOSPADM

## 2024-11-06 RX ORDER — AMOXICILLIN 250 MG
2 CAPSULE ORAL 2 TIMES DAILY
Status: DISCONTINUED | OUTPATIENT
Start: 2024-11-06 | End: 2024-11-07

## 2024-11-06 RX ORDER — MIDAZOLAM HYDROCHLORIDE 1 MG/ML
INJECTION, SOLUTION INTRAMUSCULAR; INTRAVENOUS AS NEEDED
Status: DISCONTINUED | OUTPATIENT
Start: 2024-11-06 | End: 2024-11-06 | Stop reason: HOSPADM

## 2024-11-06 RX ORDER — DAPAGLIFLOZIN 10 MG/1
5 TABLET, FILM COATED ORAL DAILY
Status: DISCONTINUED | OUTPATIENT
Start: 2024-11-06 | End: 2024-11-09 | Stop reason: HOSPADM

## 2024-11-06 RX ORDER — METOPROLOL SUCCINATE 25 MG/1
25 TABLET, EXTENDED RELEASE ORAL 2 TIMES DAILY
Status: DISCONTINUED | OUTPATIENT
Start: 2024-11-06 | End: 2024-11-06

## 2024-11-06 RX ORDER — POLYETHYLENE GLYCOL 3350 17 G/17G
17 POWDER, FOR SOLUTION ORAL DAILY PRN
Status: DISCONTINUED | OUTPATIENT
Start: 2024-11-06 | End: 2024-11-09 | Stop reason: HOSPADM

## 2024-11-06 RX ORDER — ATORVASTATIN CALCIUM 80 MG/1
80 TABLET, FILM COATED ORAL NIGHTLY
Status: DISCONTINUED | OUTPATIENT
Start: 2024-11-07 | End: 2024-11-09 | Stop reason: HOSPADM

## 2024-11-06 RX ORDER — POTASSIUM CHLORIDE 1.5 G/1.58G
40 POWDER, FOR SOLUTION ORAL EVERY 4 HOURS
Status: DISCONTINUED | OUTPATIENT
Start: 2024-11-06 | End: 2024-11-07

## 2024-11-06 RX ORDER — ALPRAZOLAM 0.5 MG/1
0.5 TABLET ORAL 3 TIMES DAILY PRN
Status: DISCONTINUED | OUTPATIENT
Start: 2024-11-06 | End: 2024-11-09 | Stop reason: HOSPADM

## 2024-11-06 RX ORDER — PANTOPRAZOLE SODIUM 40 MG/1
40 TABLET, DELAYED RELEASE ORAL
Status: DISCONTINUED | OUTPATIENT
Start: 2024-11-07 | End: 2024-11-07

## 2024-11-06 RX ORDER — CALCIUM CARBONATE 200(500)MG
1 TABLET,CHEWABLE ORAL DAILY
Status: DISCONTINUED | OUTPATIENT
Start: 2024-11-07 | End: 2024-11-09 | Stop reason: HOSPADM

## 2024-11-06 RX ORDER — METOPROLOL SUCCINATE 25 MG/1
12.5 TABLET, EXTENDED RELEASE ORAL 2 TIMES DAILY
Status: DISCONTINUED | OUTPATIENT
Start: 2024-11-06 | End: 2024-11-06

## 2024-11-06 RX ORDER — METOPROLOL SUCCINATE 25 MG/1
12.5 TABLET, EXTENDED RELEASE ORAL 2 TIMES DAILY
Status: DISCONTINUED | OUTPATIENT
Start: 2024-11-06 | End: 2024-11-09 | Stop reason: HOSPADM

## 2024-11-06 RX ORDER — ACETAMINOPHEN 500 MG
10 TABLET ORAL NIGHTLY
Status: DISCONTINUED | OUTPATIENT
Start: 2024-11-06 | End: 2024-11-09 | Stop reason: HOSPADM

## 2024-11-06 RX ORDER — ENOXAPARIN SODIUM 100 MG/ML
40 INJECTION SUBCUTANEOUS EVERY 24 HOURS
Status: DISCONTINUED | OUTPATIENT
Start: 2024-11-07 | End: 2024-11-09 | Stop reason: HOSPADM

## 2024-11-06 RX ORDER — LANOLIN ALCOHOL/MO/W.PET/CERES
800 CREAM (GRAM) TOPICAL 2 TIMES DAILY
Status: DISCONTINUED | OUTPATIENT
Start: 2024-11-06 | End: 2024-11-09 | Stop reason: HOSPADM

## 2024-11-06 RX ORDER — ESCITALOPRAM OXALATE 10 MG/1
10 TABLET ORAL DAILY
Status: DISCONTINUED | OUTPATIENT
Start: 2024-11-07 | End: 2024-11-09 | Stop reason: HOSPADM

## 2024-11-06 RX ORDER — BUDESONIDE 3 MG/1
3 CAPSULE, COATED PELLETS ORAL DAILY
Status: DISCONTINUED | OUTPATIENT
Start: 2024-11-07 | End: 2024-11-08

## 2024-11-06 RX ORDER — CLOPIDOGREL BISULFATE 75 MG/1
75 TABLET ORAL DAILY
Status: DISCONTINUED | OUTPATIENT
Start: 2024-11-07 | End: 2024-11-09 | Stop reason: HOSPADM

## 2024-11-06 RX ORDER — NITROGLYCERIN 40 MG/100ML
INJECTION INTRAVENOUS AS NEEDED
Status: DISCONTINUED | OUTPATIENT
Start: 2024-11-06 | End: 2024-11-06 | Stop reason: HOSPADM

## 2024-11-06 RX ORDER — FAMOTIDINE 20 MG/1
40 TABLET, FILM COATED ORAL DAILY
Status: DISCONTINUED | OUTPATIENT
Start: 2024-11-07 | End: 2024-11-09 | Stop reason: HOSPADM

## 2024-11-06 RX ORDER — TAMSULOSIN HYDROCHLORIDE 0.4 MG/1
0.4 CAPSULE ORAL DAILY
Status: DISCONTINUED | OUTPATIENT
Start: 2024-11-07 | End: 2024-11-09 | Stop reason: HOSPADM

## 2024-11-06 RX ORDER — FENTANYL CITRATE 50 UG/ML
INJECTION, SOLUTION INTRAMUSCULAR; INTRAVENOUS AS NEEDED
Status: DISCONTINUED | OUTPATIENT
Start: 2024-11-06 | End: 2024-11-06 | Stop reason: HOSPADM

## 2024-11-06 RX ORDER — SPIRONOLACTONE 25 MG/1
12.5 TABLET ORAL DAILY
Status: DISCONTINUED | OUTPATIENT
Start: 2024-11-06 | End: 2024-11-09 | Stop reason: HOSPADM

## 2024-11-06 RX ORDER — HEPARIN SODIUM 1000 [USP'U]/ML
INJECTION, SOLUTION INTRAVENOUS; SUBCUTANEOUS AS NEEDED
Status: DISCONTINUED | OUTPATIENT
Start: 2024-11-06 | End: 2024-11-06 | Stop reason: HOSPADM

## 2024-11-06 RX ORDER — CHOLECALCIFEROL (VITAMIN D3) 25 MCG
2000 TABLET ORAL DAILY
Status: DISCONTINUED | OUTPATIENT
Start: 2024-11-07 | End: 2024-11-09 | Stop reason: HOSPADM

## 2024-11-06 RX ORDER — ENOXAPARIN SODIUM 100 MG/ML
40 INJECTION SUBCUTANEOUS EVERY 24 HOURS
Status: DISCONTINUED | OUTPATIENT
Start: 2024-11-06 | End: 2024-11-06

## 2024-11-06 RX ORDER — LIDOCAINE HYDROCHLORIDE 20 MG/ML
INJECTION, SOLUTION INFILTRATION; PERINEURAL AS NEEDED
Status: DISCONTINUED | OUTPATIENT
Start: 2024-11-06 | End: 2024-11-06 | Stop reason: HOSPADM

## 2024-11-06 RX ADMIN — ACETAMINOPHEN 650 MG: 325 TABLET ORAL at 20:14

## 2024-11-06 RX ADMIN — DAPAGLIFLOZIN 5 MG: 10 TABLET, FILM COATED ORAL at 18:45

## 2024-11-06 RX ADMIN — Medication 10 MG: at 20:14

## 2024-11-06 RX ADMIN — POTASSIUM CHLORIDE 40 MEQ: 1.5 POWDER, FOR SOLUTION ORAL at 21:02

## 2024-11-06 RX ADMIN — SPIRONOLACTONE 12.5 MG: 25 TABLET, FILM COATED ORAL at 18:44

## 2024-11-06 RX ADMIN — MAGNESIUM OXIDE TAB 400 MG (241.3 MG ELEMENTAL MG) 800 MG: 400 (241.3 MG) TAB at 21:02

## 2024-11-06 SDOH — ECONOMIC STABILITY: FOOD INSECURITY: WITHIN THE PAST 12 MONTHS, THE FOOD YOU BOUGHT JUST DIDN'T LAST AND YOU DIDN'T HAVE MONEY TO GET MORE.: NEVER TRUE

## 2024-11-06 SDOH — SOCIAL STABILITY: SOCIAL INSECURITY
WITHIN THE LAST YEAR, HAVE YOU BEEN KICKED, HIT, SLAPPED, OR OTHERWISE PHYSICALLY HURT BY YOUR PARTNER OR EX-PARTNER?: NO

## 2024-11-06 SDOH — SOCIAL STABILITY: SOCIAL INSECURITY: ABUSE: ADULT

## 2024-11-06 SDOH — SOCIAL STABILITY: SOCIAL INSECURITY: WITHIN THE LAST YEAR, HAVE YOU BEEN HUMILIATED OR EMOTIONALLY ABUSED IN OTHER WAYS BY YOUR PARTNER OR EX-PARTNER?: NO

## 2024-11-06 SDOH — SOCIAL STABILITY: SOCIAL INSECURITY: HAVE YOU HAD THOUGHTS OF HARMING ANYONE ELSE?: NO

## 2024-11-06 SDOH — ECONOMIC STABILITY: INCOME INSECURITY: IN THE PAST 12 MONTHS HAS THE ELECTRIC, GAS, OIL, OR WATER COMPANY THREATENED TO SHUT OFF SERVICES IN YOUR HOME?: NO

## 2024-11-06 SDOH — SOCIAL STABILITY: SOCIAL INSECURITY: HAS ANYONE EVER THREATENED TO HURT YOUR FAMILY OR YOUR PETS?: NO

## 2024-11-06 SDOH — SOCIAL STABILITY: SOCIAL INSECURITY: WITHIN THE LAST YEAR, HAVE YOU BEEN AFRAID OF YOUR PARTNER OR EX-PARTNER?: NO

## 2024-11-06 SDOH — SOCIAL STABILITY: SOCIAL INSECURITY: DO YOU FEEL ANYONE HAS EXPLOITED OR TAKEN ADVANTAGE OF YOU FINANCIALLY OR OF YOUR PERSONAL PROPERTY?: NO

## 2024-11-06 SDOH — SOCIAL STABILITY: SOCIAL INSECURITY: DOES ANYONE TRY TO KEEP YOU FROM HAVING/CONTACTING OTHER FRIENDS OR DOING THINGS OUTSIDE YOUR HOME?: NO

## 2024-11-06 SDOH — SOCIAL STABILITY: SOCIAL INSECURITY
WITHIN THE LAST YEAR, HAVE YOU BEEN RAPED OR FORCED TO HAVE ANY KIND OF SEXUAL ACTIVITY BY YOUR PARTNER OR EX-PARTNER?: NO

## 2024-11-06 SDOH — SOCIAL STABILITY: SOCIAL INSECURITY: ARE THERE ANY APPARENT SIGNS OF INJURIES/BEHAVIORS THAT COULD BE RELATED TO ABUSE/NEGLECT?: NO

## 2024-11-06 SDOH — HEALTH STABILITY: MENTAL HEALTH
DO YOU FEEL STRESS - TENSE, RESTLESS, NERVOUS, OR ANXIOUS, OR UNABLE TO SLEEP AT NIGHT BECAUSE YOUR MIND IS TROUBLED ALL THE TIME - THESE DAYS?: ONLY A LITTLE

## 2024-11-06 SDOH — ECONOMIC STABILITY: FOOD INSECURITY: WITHIN THE PAST 12 MONTHS, YOU WORRIED THAT YOUR FOOD WOULD RUN OUT BEFORE YOU GOT THE MONEY TO BUY MORE.: NEVER TRUE

## 2024-11-06 SDOH — SOCIAL STABILITY: SOCIAL INSECURITY: HAVE YOU HAD ANY THOUGHTS OF HARMING ANYONE ELSE?: NO

## 2024-11-06 SDOH — HEALTH STABILITY: PHYSICAL HEALTH: ON AVERAGE, HOW MANY DAYS PER WEEK DO YOU ENGAGE IN MODERATE TO STRENUOUS EXERCISE (LIKE A BRISK WALK)?: 7 DAYS

## 2024-11-06 SDOH — ECONOMIC STABILITY: FOOD INSECURITY: HOW HARD IS IT FOR YOU TO PAY FOR THE VERY BASICS LIKE FOOD, HOUSING, MEDICAL CARE, AND HEATING?: NOT HARD AT ALL

## 2024-11-06 SDOH — HEALTH STABILITY: PHYSICAL HEALTH
HOW OFTEN DO YOU NEED TO HAVE SOMEONE HELP YOU WHEN YOU READ INSTRUCTIONS, PAMPHLETS, OR OTHER WRITTEN MATERIAL FROM YOUR DOCTOR OR PHARMACY?: NEVER

## 2024-11-06 SDOH — SOCIAL STABILITY: SOCIAL INSECURITY: WERE YOU ABLE TO COMPLETE ALL THE BEHAVIORAL HEALTH SCREENINGS?: YES

## 2024-11-06 SDOH — SOCIAL STABILITY: SOCIAL INSECURITY: DO YOU FEEL UNSAFE GOING BACK TO THE PLACE WHERE YOU ARE LIVING?: NO

## 2024-11-06 SDOH — HEALTH STABILITY: PHYSICAL HEALTH: ON AVERAGE, HOW MANY MINUTES DO YOU ENGAGE IN EXERCISE AT THIS LEVEL?: 60 MIN

## 2024-11-06 SDOH — SOCIAL STABILITY: SOCIAL INSECURITY: ARE YOU OR HAVE YOU BEEN THREATENED OR ABUSED PHYSICALLY, EMOTIONALLY, OR SEXUALLY BY ANYONE?: NO

## 2024-11-06 ASSESSMENT — ACTIVITIES OF DAILY LIVING (ADL)
TOILETING: INDEPENDENT
BATHING: INDEPENDENT
HEARING - RIGHT EAR: FUNCTIONAL
LACK_OF_TRANSPORTATION: NO
JUDGMENT_ADEQUATE_SAFELY_COMPLETE_DAILY_ACTIVITIES: YES
FEEDING YOURSELF: INDEPENDENT
ADEQUATE_TO_COMPLETE_ADL: YES
GROOMING: INDEPENDENT
DRESSING YOURSELF: INDEPENDENT
WALKS IN HOME: INDEPENDENT
HEARING - LEFT EAR: FUNCTIONAL
PATIENT'S MEMORY ADEQUATE TO SAFELY COMPLETE DAILY ACTIVITIES?: YES

## 2024-11-06 ASSESSMENT — COLUMBIA-SUICIDE SEVERITY RATING SCALE - C-SSRS
1. IN THE PAST MONTH, HAVE YOU WISHED YOU WERE DEAD OR WISHED YOU COULD GO TO SLEEP AND NOT WAKE UP?: NO
2. HAVE YOU ACTUALLY HAD ANY THOUGHTS OF KILLING YOURSELF?: NO
6. HAVE YOU EVER DONE ANYTHING, STARTED TO DO ANYTHING, OR PREPARED TO DO ANYTHING TO END YOUR LIFE?: NO

## 2024-11-06 ASSESSMENT — ENCOUNTER SYMPTOMS
FATIGUE: 1
CARDIOVASCULAR NEGATIVE: 1
SHORTNESS OF BREATH: 1
NEUROLOGICAL NEGATIVE: 1
HEMATOLOGIC/LYMPHATIC NEGATIVE: 1
PSYCHIATRIC NEGATIVE: 1
ALLERGIC/IMMUNOLOGIC NEGATIVE: 1
ENDOCRINE NEGATIVE: 1
GASTROINTESTINAL NEGATIVE: 1
MUSCULOSKELETAL NEGATIVE: 1
EYES NEGATIVE: 1

## 2024-11-06 ASSESSMENT — COGNITIVE AND FUNCTIONAL STATUS - GENERAL
PATIENT BASELINE BEDBOUND: NO
DAILY ACTIVITIY SCORE: 24
MOBILITY SCORE: 24

## 2024-11-06 ASSESSMENT — LIFESTYLE VARIABLES
HAS A RELATIVE, FRIEND, DOCTOR, OR ANOTHER HEALTH PROFESSIONAL EXPRESSED CONCERN ABOUT YOUR DRINKING OR SUGGESTED YOU CUT DOWN: NO
HOW OFTEN DURING THE LAST YEAR HAVE YOU HAD A FEELING OF GUILT OR REMORSE AFTER DRINKING: NEVER
AUDIT TOTAL SCORE: 0
AUDIT-C TOTAL SCORE: 5
HOW OFTEN DO YOU HAVE A DRINK CONTAINING ALCOHOL: 4 OR MORE TIMES A WEEK
HAVE YOU OR SOMEONE ELSE BEEN INJURED AS A RESULT OF YOUR DRINKING: NO
HOW OFTEN DO YOU HAVE 6 OR MORE DRINKS ON ONE OCCASION: NEVER
AUDIT-C TOTAL SCORE: 5
HOW OFTEN DURING THE LAST YEAR HAVE YOU FOUND THAT YOU WERE NOT ABLE TO STOP DRINKING ONCE YOU HAD STARTED: NEVER
AUDIT TOTAL SCORE: 5
HOW OFTEN DURING THE LAST YEAR HAVE YOU NEEDED AN ALCOHOLIC DRINK FIRST THING IN THE MORNING TO GET YOURSELF GOING AFTER A NIGHT OF HEAVY DRINKING: NEVER
PRESCIPTION_ABUSE_PAST_12_MONTHS: NO
SUBSTANCE_ABUSE_PAST_12_MONTHS: NO
SKIP TO QUESTIONS 9-10: 0
HOW OFTEN DURING THE LAST YEAR HAVE YOU BEEN UNABLE TO REMEMBER WHAT HAPPENED THE NIGHT BEFORE BECAUSE YOU HAD BEEN DRINKING: NEVER
HOW OFTEN DURING THE LAST YEAR HAVE YOU FAILED TO DO WHAT WAS NORMALLY EXPECTED FROM YOU BECAUSE OF DRINKING: NEVER
HOW MANY STANDARD DRINKS CONTAINING ALCOHOL DO YOU HAVE ON A TYPICAL DAY: 3 OR 4

## 2024-11-06 ASSESSMENT — PATIENT HEALTH QUESTIONNAIRE - PHQ9
SUM OF ALL RESPONSES TO PHQ9 QUESTIONS 1 & 2: 1
2. FEELING DOWN, DEPRESSED OR HOPELESS: NOT AT ALL
1. LITTLE INTEREST OR PLEASURE IN DOING THINGS: SEVERAL DAYS

## 2024-11-06 ASSESSMENT — PAIN - FUNCTIONAL ASSESSMENT
PAIN_FUNCTIONAL_ASSESSMENT: 0-10

## 2024-11-06 ASSESSMENT — PAIN SCALES - GENERAL
PAINLEVEL_OUTOF10: 3
PAINLEVEL_OUTOF10: 0 - NO PAIN
PAINLEVEL_OUTOF10: 5 - MODERATE PAIN

## 2024-11-06 ASSESSMENT — PAIN DESCRIPTION - LOCATION: LOCATION: HEAD

## 2024-11-06 NOTE — POST-PROCEDURE NOTE
Physician Transition of Care Summary  Invasive Cardiovascular Lab    Procedure Date: 11/6/2024  Attending:    * Lobo Sousa - Primary  Resident/Fellow/Other Assistant: Surgeons and Role:     * Iftikhar Arzola MD - Fellow  Ja Hess MD    Indications:   Pre-op Diagnosis      * CAD (coronary artery disease) [I25.10]    Post-procedure diagnosis:   Post-op Diagnosis     * CAD (coronary artery disease) [I25.10]    Procedure(s):   Left & Right Heart Cath w Angiography & LV  [id number] - DE R & L HRT CATH WINJX HRT ART& L VENTR IMG        Procedure Findings:   Left heart cath - patent Left sided stents; No significant disease  Right heart cath - Co/CI= 4.5/2.6; RA mean 6; RV 55/3; PA 56/19, PCWP with large V wave- 19/43(mean 28)    Access/Closure:  Right radial artery/TR band  R Internal jugular/manual pressure      Complications:   None    Stents/Implants:   Implants       No implant documentation for this case.            Anticoagulation/Antiplatelet Plan:   N/A- As at baseline    Estimated Blood Loss:   10 mL    Anesthesia: Moderate Sedation Anesthesia Staff: No anesthesia staff entered.    Any Specimen(s) Removed:   No specimens collected during this procedure.    Disposition:   Inpatient      Electronically signed by: Ja Hess MD, 11/6/2024 4:34 PM

## 2024-11-06 NOTE — H&P
[facility]  HFICU HISTORY AND PHYSICAL     Anurag Waite/[id number]    Admit Date: 11/6/2024  Hospital Length of Stay: 0   ICU Length of Stay: 1h   Primary Service: HFICU  Primary HF Cardiologist: Dr Sousa  Referring: Dr Sousa    HPI:   Anurag Waite is a 74 y.o. male with a h/o AAA (4 cm), PAD/RLE claudication s/p femoral artery endarterectomy, HF class 3 (not on GDMT), CAD s/p LCX PCI attempt c/b LM-LAD dissection, MI, VT, and hx of cardiogenic shock at [facility] in [place], [place].     OSH August 2024: PCI of LCX (2.25 x 16) c/b LM-LAD dissection requiring stent LM (3.5 x 8), LM-LAD (3.5 x 48), LAD (3.25 x 8) with cardiogenic shock, IABP, VT and intubation for 24 hours. Post-PCI echo showed LVEF 40-45% with apical HK. Recurrent visits for HFrEF with torsemide 20 to 40 mg and metoprolol ER 25 mg only. No spironolactone, Entresto/ACEI/ARB or SGLT-2 inhibitors.   Per review of the OSH PCI images show Ostial LCX jailed with ostial LCX stensois, loss of ramus/high OM that fills via left-to-left collaterals. LM-LAD with good stent result.      He was scheduled for right and left heart cath today to assess his HF and to evaluate the stents placed. He reports feeling fatigued often and SOB at rest and with activity that comes and goes. Pt denied chest pain, palpitation and dizziness. He reported being complaint with his  medications including ASA, Plavix and statin therapy.     On arrival to HFICU he is warm and well perfused. Pulses are all palpable and he is on room air. [place] was removed in the cath lab, t-band remains on right radial artery from LHC insertion no bleeding is present. Left heart cath - patent Left sided stents; No significant disease  Right heart cath - CO/CI= 4.5/2.6; RA mean 6; RV 55/3; PA 56/19, PCWP with large V wave- 19/43(mean 28). Plan to initiate GDMT.     Cardiac Tests:  Echocardiogram: 11/4/24  CONCLUSIONS:   1. Left ventricular ejection fraction  is severely decreased, calculated by Mora's biplane at 29%.   2. Multiple segmental abnormalities exist. See findings.   3. There are multiple left ventricular wall motion abnormalities.   4. Spectral Doppler shows a Grade III (restrictive pattern) of left ventricular diastolic filling with an elevated left atrial pressure.   5. Left ventricular cavity size is severely dilated.   6. No left ventricular thrombus visualized.   7. There is normal right ventricular global systolic function.   8. Moderately enlarged right ventricle.   9. The left atrium is severely dilated.  10. The right atrium is severely dilated.  11. Mild to moderate mitral valve regurgitation.  12. Mild to moderate tricuspid regurgitation visualized.  13. Severely elevated right ventricular systolic pressure.  14. Mild to moderate aortic valve regurgitation.      Past Medical History:  Past Medical History:   Diagnosis Date    BPH (benign prostatic hyperplasia)     CHF (congestive heart failure)     Coronary artery disease     HLD (hyperlipidemia)     Hypertension     Ischemic cardiomyopathy     MI (myocardial infarction) (Multi)     PAD (peripheral artery disease) (CMS-[facility])     Ulcerative colitis     VT (ventricular tachycardia) (Multi)        Past Surgical History:  Past Surgical History:   Procedure Laterality Date    CAROTID ENDARTERECTOMY      CORONARY ANGIOPLASTY      HERNIA REPAIR         Family History:  Family History   Problem Relation Name Age of Onset    Heart attack Father      Coronary artery disease Brother      History of coronary artery bypass graft Brother         Social History:  Social History     Socioeconomic History    Marital status: Unknown     Spouse name: Not on file    Number of children: Not on file    Years of education: Not on file    Highest education level: Not on file   Occupational History    Not on file   Tobacco Use    Smoking status: Former     Current packs/day: 0.00     Types: Cigarettes     Quit date:  2024     Years since quittin.3    Smokeless tobacco: Never   Vaping Use    Vaping status: Never Used   Substance and Sexual Activity    Alcohol use: Yes     Alcohol/week: 14.0 standard drinks of alcohol     Types: 14 Cans of beer per week    Drug use: Never    Sexual activity: Defer   Other Topics Concern    Not on file   Social History Narrative    Not on file     Social Drivers of Health     Financial Resource Strain: Not on file   Food Insecurity: Not on file   Transportation Needs: Not on file   Physical Activity: Not on file   Stress: Not on file   Social Connections: Not on file   Intimate Partner Violence: Not on file   Housing Stability: Not on file       Allergies:  No Known Allergies    Prior to Admission Meds:  Medications Prior to Admission   Medication Sig Dispense Refill Last Dose/Taking    ALPRAZolam (Xanax) 0.5 mg tablet Take 1 tablet (0.5 mg) by mouth 3 times a day as needed.       atorvastatin (Lipitor) 80 mg tablet Take 1 tablet (80 mg) by mouth once daily.       budesonide EC (Entocort EC) 3 mg 24 hr capsule Take 1 capsule (3 mg) by mouth once daily.       calcium carbonate (Tums) 200 mg calcium chewable tablet 1 tablet (500 mg) once daily.       cholecalciferol, vitamin D3, 25 mcg (1,000 unit) tablet,chewable Chew 2 tablets (50 mcg) once daily.       clopidogrel (Plavix) 75 mg tablet Take 1 tablet (75 mg) by mouth once daily.       escitalopram (Lexapro) 20 mg tablet Take 0.5 tablets (10 mg) by mouth once daily.       famotidine (Pepcid) 40 mg tablet Take 1 tablet (40 mg) by mouth once daily.       finasteride (Proscar) 5 mg tablet Take 1 tablet (5 mg) by mouth once daily.       Klor-Con M10 10 mEq ER tablet Take 1 tablet (10 mEq) by mouth once daily.       melatonin 10 mg tablet,chewable Chew 5 mg once daily.       metoprolol succinate XL (Toprol-XL) 25 mg 24 hr tablet Take 0.5 tablets (12.5 mg) by mouth 2 times a day. Do not crush or chew.       multivitamin tablet Take 1 tablet by  "mouth once daily.       nitroglycerin (Nitrostat) 0.4 mg SL tablet Place 1 tablet (0.4 mg) under the tongue.       omeprazole (PriLOSEC) 40 mg DR capsule Take 1 capsule (40 mg) by mouth once daily.       tamsulosin (Flomax) 0.4 mg 24 hr capsule Take 1 capsule (0.4 mg) by mouth once daily.       torsemide (Demadex) 20 mg tablet Take 1 tablet (20 mg) by mouth once daily.          Current Medications:  Infusions:     Scheduled:  [START ON 11/7/2024] atorvastatin, 80 mg, Nightly  [START ON 11/7/2024] budesonide EC, 3 mg, Daily  [START ON 11/7/2024] calcium carbonate, 1 tablet, Daily  [START ON 11/7/2024] cholecalciferol, 2,000 Units, Daily  [START ON 11/7/2024] clopidogrel, 75 mg, Daily  dapagliflozin propanediol, 5 mg, Daily  [START ON 11/7/2024] enoxaparin, 40 mg, q24h  [START ON 11/7/2024] escitalopram, 10 mg, Daily  [START ON 11/7/2024] famotidine, 40 mg, Daily  [START ON 11/7/2024] finasteride, 5 mg, Daily  melatonin, 10 mg, Nightly  metoprolol succinate XL, 12.5 mg, BID  [START ON 11/7/2024] pantoprazole, 40 mg, Daily before breakfast  sennosides-docusate sodium, 2 tablet, BID  spironolactone, 12.5 mg, Daily  [START ON 11/7/2024] tamsulosin, 0.4 mg, Daily      PRN:  acetaminophen, 650 mg, q6h PRN  ALPRAZolam, 0.5 mg, TID PRN  polyethylene glycol, 17 g, Daily PRN        PHYSICAL EXAM:   Visit Vitals  BP (!) 112/94   Pulse 58   Resp 12   Ht 1.702 m (5' 7.01\")   Wt 60.3 kg (133 lb)   SpO2 98%   BMI 20.83 kg/m²   Smoking Status Former   BSA 1.69 m²       Wt Readings from Last 5 Encounters:   11/06/24 60.3 kg (133 lb)   11/04/24 62.4 kg (137 lb 9.6 oz)       INTAKE/OUTPUT:  No intake/output data recorded.     Physical Exam  Constitutional:       General: He is not in acute distress.     Appearance: Normal appearance. He is not ill-appearing.   HENT:      Head: Normocephalic.      Mouth/Throat:      Mouth: Mucous membranes are dry.   Eyes:      Pupils: Pupils are equal, round, and reactive to light.   Cardiovascular:    " "  Rate and Rhythm: Normal rate and regular rhythm.      Pulses: Normal pulses.      Heart sounds: Murmur heard.   Pulmonary:      Effort: Pulmonary effort is normal. No respiratory distress.      Breath sounds: Normal breath sounds.   Abdominal:      General: Abdomen is flat. Bowel sounds are normal. There is no distension.      Palpations: Abdomen is soft.   Musculoskeletal:         General: Normal range of motion.      Cervical back: Normal range of motion.   Skin:     General: Skin is warm and dry.      Capillary Refill: Capillary refill takes less than 2 seconds.   Neurological:      General: No focal deficit present.      Mental Status: He is alert and oriented to person, place, and time.   Psychiatric:         Mood and Affect: Mood normal.         Behavior: Behavior normal.         Review of Systems   Constitutional:  Positive for fatigue.   HENT: Negative.     Eyes: Negative.    Respiratory:  Positive for shortness of breath.    Cardiovascular: Negative.    Gastrointestinal: Negative.    Endocrine: Negative.    Genitourinary: Negative.    Musculoskeletal: Negative.    Skin: Negative.    Allergic/Immunologic: Negative.    Neurological: Negative.    Hematological: Negative.    Psychiatric/Behavioral: Negative.         DATA:  CMP:  Recent Labs     11/04/24  1205      K 3.2*   CL 97*   CO2 33*   ANIONGAP 10   BUN 19   CREATININE 1.04   EGFR 76     No results for input(s): \"ALBUMIN\", \"ALT\", \"AST\", \"BILITOT\", \"LIPASE\" in the last 42379 hours.    No lab exists for component: \"CA\"  CBC:  Recent Labs     11/04/24  1205   WBC 5.3   HGB 9.8*   HCT 33.4*      MCV 79*     COAG: No results for input(s): \"PTT\", \"INR\", \"ARIXTRA\" in the last 56881 hours.  ABO: No results for input(s): \"ABO\" in the last 37109 hours.  HEME/ENDO:No results for input(s): \"FERRITIN\", \"IRONSAT\", \"TSH\", \"HGBA1C\" in the last 20245 hours.   CARDIAC: No results for input(s): \"LDH\", \"CKMB\", \"TROPHS\", \"BNP\" in the last 19510 hours.    No lab " "exists for component: \"CK\", \"CKMBP\"  No results for input(s): \"LACMX\", \"LACTATEART\", \"SO2MV\", \"O2CMX\" in the last 89255 hours.    No lab exists for component: \"S\"  No results for input(s): \"TACROLIMUS\", \"SIROLIMUS\", \"CYCLOSPORINE\" in the last 78095 hours.      ASSESSMENT AND PLAN:   Anurag Waite is a 74 y.o. male with a h/o AAA (4 cm), PAD/RLE claudication s/p femoral artery endarterectomy, HF class 3 (not on GDMT), CAD s/p LCX PCI attempt c/b LM-LAD dissection, MI, VT, and hx of cardiogenic shock who presents to HFICU post left and right heart cath for initiation of GDMT.     Neuro:  #Anxiety  - Serial neuro and pain assessments   - PO Tylenol PRN for pain  - PT/OT Consult, OOB to chair  - CAM ICU score every shift  - Sleep/wake cycle normalization  - C/w home ativan PRN for anxiety  - C/w home lexapro 10 mg daily   - C/w home melatonin 10 mg at bedtime     #Substance abuse  -Alcohol abuse/Alcohol dependence - drinks 14 cans of beer per week   -Tobacco use/Nicotine Dependence - quit smoking 7/8/2024    Cardiovascular:  #ICM HFrEF 25-30%  #Moderate MR, TR and AR   #HTN   - TTE 11/4/2024: LVSF 29%, LA and RA severely dilated, mild/moderate MR, TR, AR  - admit weight 62.4 kg  - admit BNP pending   - Right heart cath 11/6/2024- CO/CI= 4.5/2.6; RA mean 6; RV 55/3; PA 56/19, PCWP with large V wave- 19/43 (mean 28) swan removed in cath lab   - Hold diuresis as CVP was 3 in cath lab   - C/w metoprolol 25 mg BID  - Initiate spironolactone 12.5 mg daily  - Initiate farxiga 5 mg daily   - Daily standing weights, 2gm sodium diet, 2L fluid restriction, strict I&Os    #CAD   #PAD  #Hx of MI, PCI   - C 11/6/2024 - patent Left sided stents; No significant disease  - C/w atorvastatin 80 mg daily and plavix 75 mg daily     #Electrolyte Disturbances  - Maintain K >4, Mg >2     Pulmonary:   -No Hx of pulmonary disease/Pulmonary Hypertension  -Monitor and maintain SpO2 > 92%    GI:  #Hx of Ulcerative colitis   - C/w home Entocort   - " Bowel regimen: sri-colace BID, miralax PRN   - C/w protonix and pepcid daily      :  #BPH  - C/w home finasteride and tamsulosin  - Baseline BUN/Cr normal  - I/Os  - Avoid hypotension and nephrotoxic agents    Heme:  #Anemia   - Labs: CBC, TIBC, ferritin, serum Fe, folate, B12  pending    - If %sat low, order venofer     Endo:  #No hx of DM  - Euglycemic  - hgbA1c pending    #Thyroid  - TSH with reflex pending     ID:  -afebrile, nontoxic   -no s/s infx  -trend temps q4h      PHYSICAL AND OCCUPATIONAL THERAPY: ordered and following     LINES:  PIVs     DVT: lovenox  VAP BUNDLE: NA  CENTRAL LINE BUNDLE: NA  ULCER PPX: PPI  GLYCEMIC CONTROL: NA  BOWEL CARE:sri-colace and miralax PRN  INDWELLING CATHETER: NA  NUTRITION: Adult diet Cardiac; 70 gm fat; 2 - 3 grams Sodium      EMERGENCY CONTACT: Extended Emergency Contact Information  Primary Emergency Contact: Genna Waite  Address: [address] [place] [address]  Home Phone: [phone]  Mobile Phone: [phone]  Relation: Spouse  FAMILY UPDATE: given at bedside  CODE STATUS: Full Code  DISPO: admit to HFICU, transfer to floor tomorrow     Patient seen and assessed with Dr. Deana ROBLERO personally spent 60 minutes of critical care time directly and personally managing the patient exclusive of separately billable procedures   _________________________________________________  MILDRED Miles-CNP

## 2024-11-06 NOTE — Clinical Note
Patient Clipped and Prepped: right radial and right neck. Prepped with ChloraPrep, a minimum of 3 minute dry time, longer if needed, no pooling noted, patient draped in sterile fashion.

## 2024-11-07 ENCOUNTER — APPOINTMENT (OUTPATIENT)
Dept: RADIOLOGY | Facility: HOSPITAL | Age: 74
End: 2024-11-07
Payer: MEDICARE

## 2024-11-07 PROBLEM — R19.7 DIARRHEA: Status: ACTIVE | Noted: 2024-11-07

## 2024-11-07 LAB
ALBUMIN SERPL BCP-MCNC: 3.1 G/DL (ref 3.4–5)
ANION GAP SERPL CALC-SCNC: 15 MMOL/L (ref 10–20)
ATRIAL RATE: 54 BPM
BASOPHILS # BLD AUTO: 0.04 X10*3/UL (ref 0–0.1)
BASOPHILS NFR BLD AUTO: 0.6 %
BUN SERPL-MCNC: 18 MG/DL (ref 6–23)
C DIF TOX TCDA+TCDB STL QL NAA+PROBE: NOT DETECTED
CALCIUM SERPL-MCNC: 8.3 MG/DL (ref 8.6–10.6)
CHLORIDE SERPL-SCNC: 100 MMOL/L (ref 98–107)
CO2 SERPL-SCNC: 25 MMOL/L (ref 21–32)
CREAT SERPL-MCNC: 1.22 MG/DL (ref 0.5–1.3)
EGFRCR SERPLBLD CKD-EPI 2021: 63 ML/MIN/1.73M*2
EOSINOPHIL # BLD AUTO: 0.13 X10*3/UL (ref 0–0.4)
EOSINOPHIL NFR BLD AUTO: 2.1 %
ERYTHROCYTE [DISTWIDTH] IN BLOOD BY AUTOMATED COUNT: 18.6 % (ref 11.5–14.5)
GLUCOSE SERPL-MCNC: 100 MG/DL (ref 74–99)
HCT VFR BLD AUTO: 30.2 % (ref 41–52)
HGB BLD-MCNC: 9.2 G/DL (ref 13.5–17.5)
IMM GRANULOCYTES # BLD AUTO: 0.01 X10*3/UL (ref 0–0.5)
IMM GRANULOCYTES NFR BLD AUTO: 0.2 % (ref 0–0.9)
LYMPHOCYTES # BLD AUTO: 0.82 X10*3/UL (ref 0.8–3)
LYMPHOCYTES NFR BLD AUTO: 13.1 %
MAGNESIUM SERPL-MCNC: 1.83 MG/DL (ref 1.6–2.4)
MCH RBC QN AUTO: 23.8 PG (ref 26–34)
MCHC RBC AUTO-ENTMCNC: 30.5 G/DL (ref 32–36)
MCV RBC AUTO: 78 FL (ref 80–100)
MONOCYTES # BLD AUTO: 0.59 X10*3/UL (ref 0.05–0.8)
MONOCYTES NFR BLD AUTO: 9.5 %
NEUTROPHILS # BLD AUTO: 4.65 X10*3/UL (ref 1.6–5.5)
NEUTROPHILS NFR BLD AUTO: 74.5 %
NRBC BLD-RTO: 0 /100 WBCS (ref 0–0)
P AXIS: 61 DEGREES
P OFFSET: 201 MS
P ONSET: 129 MS
PHOSPHATE SERPL-MCNC: 3.7 MG/DL (ref 2.5–4.9)
PLATELET # BLD AUTO: 347 X10*3/UL (ref 150–450)
POTASSIUM SERPL-SCNC: 3.9 MMOL/L (ref 3.5–5.3)
PR INTERVAL: 182 MS
Q ONSET: 220 MS
QRS COUNT: 9 BEATS
QRS DURATION: 88 MS
QT INTERVAL: 484 MS
QTC CALCULATION(BAZETT): 458 MS
QTC FREDERICIA: 467 MS
R AXIS: -68 DEGREES
RBC # BLD AUTO: 3.86 X10*6/UL (ref 4.5–5.9)
SODIUM SERPL-SCNC: 136 MMOL/L (ref 136–145)
T AXIS: 98 DEGREES
T OFFSET: 462 MS
VENTRICULAR RATE: 54 BPM
WBC # BLD AUTO: 6.2 X10*3/UL (ref 4.4–11.3)

## 2024-11-07 PROCEDURE — 96374 THER/PROPH/DIAG INJ IV PUSH: CPT

## 2024-11-07 PROCEDURE — 85025 COMPLETE CBC W/AUTO DIFF WBC: CPT

## 2024-11-07 PROCEDURE — 99291 CRITICAL CARE FIRST HOUR: CPT | Performed by: INTERNAL MEDICINE

## 2024-11-07 PROCEDURE — 2500000001 HC RX 250 WO HCPCS SELF ADMINISTERED DRUGS (ALT 637 FOR MEDICARE OP): Performed by: NURSE PRACTITIONER

## 2024-11-07 PROCEDURE — 74177 CT ABD & PELVIS W/CONTRAST: CPT | Performed by: RADIOLOGY

## 2024-11-07 PROCEDURE — 87493 C DIFF AMPLIFIED PROBE: CPT | Performed by: NURSE PRACTITIONER

## 2024-11-07 PROCEDURE — 2500000002 HC RX 250 W HCPCS SELF ADMINISTERED DRUGS (ALT 637 FOR MEDICARE OP, ALT 636 FOR OP/ED)

## 2024-11-07 PROCEDURE — 2500000004 HC RX 250 GENERAL PHARMACY W/ HCPCS (ALT 636 FOR OP/ED): Performed by: NURSE PRACTITIONER

## 2024-11-07 PROCEDURE — 97162 PT EVAL MOD COMPLEX 30 MIN: CPT | Mod: GP

## 2024-11-07 PROCEDURE — 2500000002 HC RX 250 W HCPCS SELF ADMINISTERED DRUGS (ALT 637 FOR MEDICARE OP, ALT 636 FOR OP/ED): Performed by: NURSE PRACTITIONER

## 2024-11-07 PROCEDURE — 1200000002 HC GENERAL ROOM WITH TELEMETRY DAILY

## 2024-11-07 PROCEDURE — 36415 COLL VENOUS BLD VENIPUNCTURE: CPT

## 2024-11-07 PROCEDURE — 83735 ASSAY OF MAGNESIUM: CPT

## 2024-11-07 PROCEDURE — 80069 RENAL FUNCTION PANEL: CPT

## 2024-11-07 PROCEDURE — 2550000001 HC RX 255 CONTRASTS: Performed by: INTERNAL MEDICINE

## 2024-11-07 PROCEDURE — 2500000004 HC RX 250 GENERAL PHARMACY W/ HCPCS (ALT 636 FOR OP/ED)

## 2024-11-07 PROCEDURE — 99291 CRITICAL CARE FIRST HOUR: CPT | Performed by: NURSE PRACTITIONER

## 2024-11-07 PROCEDURE — 2500000001 HC RX 250 WO HCPCS SELF ADMINISTERED DRUGS (ALT 637 FOR MEDICARE OP)

## 2024-11-07 PROCEDURE — 84100 ASSAY OF PHOSPHORUS: CPT

## 2024-11-07 PROCEDURE — 74177 CT ABD & PELVIS W/CONTRAST: CPT

## 2024-11-07 PROCEDURE — 87506 IADNA-DNA/RNA PROBE TQ 6-11: CPT | Performed by: NURSE PRACTITIONER

## 2024-11-07 RX ORDER — POTASSIUM CHLORIDE 20 MEQ/1
40 TABLET, EXTENDED RELEASE ORAL ONCE
Status: COMPLETED | OUTPATIENT
Start: 2024-11-07 | End: 2024-11-07

## 2024-11-07 RX ORDER — PANTOPRAZOLE SODIUM 40 MG/1
40 TABLET, DELAYED RELEASE ORAL
Status: DISCONTINUED | OUTPATIENT
Start: 2024-11-08 | End: 2024-11-09 | Stop reason: HOSPADM

## 2024-11-07 RX ORDER — POTASSIUM CHLORIDE 20 MEQ/1
20 TABLET, EXTENDED RELEASE ORAL ONCE
Status: COMPLETED | OUTPATIENT
Start: 2024-11-07 | End: 2024-11-07

## 2024-11-07 RX ORDER — ASPIRIN 81 MG/1
81 TABLET ORAL DAILY
Status: DISCONTINUED | OUTPATIENT
Start: 2024-11-07 | End: 2024-11-09 | Stop reason: HOSPADM

## 2024-11-07 RX ORDER — FERROUS SULFATE, DRIED 160(50) MG
1 TABLET, EXTENDED RELEASE ORAL 2 TIMES DAILY
COMMUNITY

## 2024-11-07 RX ORDER — LOPERAMIDE HYDROCHLORIDE 2 MG/1
2 CAPSULE ORAL 4 TIMES DAILY PRN
Status: DISCONTINUED | OUTPATIENT
Start: 2024-11-07 | End: 2024-11-09 | Stop reason: HOSPADM

## 2024-11-07 RX ADMIN — MAGNESIUM OXIDE TAB 400 MG (241.3 MG ELEMENTAL MG) 800 MG: 400 (241.3 MG) TAB at 20:49

## 2024-11-07 RX ADMIN — Medication 2000 UNITS: at 09:46

## 2024-11-07 RX ADMIN — ATORVASTATIN CALCIUM 80 MG: 80 TABLET, FILM COATED ORAL at 20:44

## 2024-11-07 RX ADMIN — Medication 10 MG: at 20:43

## 2024-11-07 RX ADMIN — ESCITALOPRAM OXALATE 10 MG: 10 TABLET ORAL at 09:47

## 2024-11-07 RX ADMIN — LOPERAMIDE HYDROCHLORIDE 2 MG: 2 CAPSULE ORAL at 14:02

## 2024-11-07 RX ADMIN — CLOPIDOGREL BISULFATE 75 MG: 75 TABLET ORAL at 09:46

## 2024-11-07 RX ADMIN — CALCIUM CARBONATE (ANTACID) CHEW TAB 500 MG 500 MG: 500 CHEW TAB at 09:46

## 2024-11-07 RX ADMIN — METOPROLOL SUCCINATE 12.5 MG: 25 TABLET, EXTENDED RELEASE ORAL at 10:22

## 2024-11-07 RX ADMIN — POTASSIUM CHLORIDE 40 MEQ: 1500 TABLET, EXTENDED RELEASE ORAL at 01:10

## 2024-11-07 RX ADMIN — ASPIRIN 81 MG: 81 TABLET, COATED ORAL at 09:46

## 2024-11-07 RX ADMIN — POTASSIUM CHLORIDE 20 MEQ: 1500 TABLET, EXTENDED RELEASE ORAL at 09:46

## 2024-11-07 RX ADMIN — FAMOTIDINE 40 MG: 20 TABLET ORAL at 09:54

## 2024-11-07 RX ADMIN — IOHEXOL 80 ML: 350 INJECTION, SOLUTION INTRAVENOUS at 20:17

## 2024-11-07 RX ADMIN — DAPAGLIFLOZIN 5 MG: 10 TABLET, FILM COATED ORAL at 12:11

## 2024-11-07 RX ADMIN — PANTOPRAZOLE SODIUM 40 MG: 40 TABLET, DELAYED RELEASE ORAL at 09:54

## 2024-11-07 RX ADMIN — MAGNESIUM OXIDE TAB 400 MG (241.3 MG ELEMENTAL MG) 800 MG: 400 (241.3 MG) TAB at 09:46

## 2024-11-07 RX ADMIN — ACETAMINOPHEN 650 MG: 325 TABLET ORAL at 20:43

## 2024-11-07 RX ADMIN — BUDESONIDE 3 MG: 3 CAPSULE, COATED PELLETS ORAL at 09:47

## 2024-11-07 RX ADMIN — TAMSULOSIN HYDROCHLORIDE 0.4 MG: 0.4 CAPSULE ORAL at 09:47

## 2024-11-07 RX ADMIN — SPIRONOLACTONE 12.5 MG: 25 TABLET, FILM COATED ORAL at 09:47

## 2024-11-07 RX ADMIN — SACUBITRIL AND VALSARTAN 1 TABLET: 24; 26 TABLET, FILM COATED ORAL at 12:11

## 2024-11-07 RX ADMIN — IRON SUCROSE 200 MG: 20 INJECTION, SOLUTION INTRAVENOUS at 13:55

## 2024-11-07 RX ADMIN — FINASTERIDE 5 MG: 5 TABLET, FILM COATED ORAL at 09:47

## 2024-11-07 RX ADMIN — LOPERAMIDE HYDROCHLORIDE 2 MG: 2 CAPSULE ORAL at 16:01

## 2024-11-07 RX ADMIN — ENOXAPARIN SODIUM 40 MG: 100 INJECTION SUBCUTANEOUS at 09:48

## 2024-11-07 ASSESSMENT — COGNITIVE AND FUNCTIONAL STATUS - GENERAL
MOVING TO AND FROM BED TO CHAIR: A LITTLE
CLIMB 3 TO 5 STEPS WITH RAILING: A LITTLE
WALKING IN HOSPITAL ROOM: A LITTLE
STANDING UP FROM CHAIR USING ARMS: A LITTLE
MOBILITY SCORE: 20

## 2024-11-07 ASSESSMENT — PAIN SCALES - GENERAL
PAINLEVEL_OUTOF10: 0 - NO PAIN
PAINLEVEL_OUTOF10: 2
PAINLEVEL_OUTOF10: 0 - NO PAIN

## 2024-11-07 ASSESSMENT — PAIN - FUNCTIONAL ASSESSMENT
PAIN_FUNCTIONAL_ASSESSMENT: 0-10

## 2024-11-07 ASSESSMENT — ACTIVITIES OF DAILY LIVING (ADL): ADL_ASSISTANCE: INDEPENDENT

## 2024-11-07 ASSESSMENT — PAIN DESCRIPTION - LOCATION: LOCATION: HEAD

## 2024-11-07 NOTE — PROGRESS NOTES
Social Work Note   HFICU Treatment Plan: The patient is from an OSH on 11/6 he had a left heart cath to assess his heart failure.  He presented to [facility]/HFICU later that day.   - Additional information: None at this time   -Support: Spouse, krista is listed as NOK  - Payee: [employer]    -Planned Disposition: Pending medical outcome and rehab recommendation  - Social barriers to discharge: None noted at this time.   WHITLEY Silva, LSW

## 2024-11-07 NOTE — PROGRESS NOTES
Pharmacy Medication History Review    Anurag Waite is a 74 y.o. male admitted for Acute combined systolic and diastolic heart failure. Pharmacy reviewed the patient's [id number] medications and allergies for accuracy.    The list below reflects the updated PTA list.   Prior to Admission Medications   Prescriptions Last Dose Informant   ALPRAZolam (Xanax) 0.5 mg tablet 11/6/2024 Spouse   Sig: Take 1 tablet (0.5 mg) by mouth 3 times a day as needed.   Klor-Con M10 10 mEq ER tablet 11/6/2024 Spouse   Sig: Take 2 tablets (20 mEq) by mouth once daily.   atorvastatin (Lipitor) 80 mg tablet 11/6/2024 Spouse   Sig: Take 1 tablet (80 mg) by mouth once daily.   budesonide EC (Entocort EC) 3 mg 24 hr capsule 11/6/2024 Spouse   Sig: Take 1 capsule (3 mg) by mouth once daily.   calcium carbonate-vitamin D3 (Oyster Shell Calcium-Vit D3) 500 mg-5 mcg (200 unit) tablet  Spouse   Sig: Take 1 tablet by mouth 2 times a day.   cholecalciferol (Vitamin D-3) 50 mcg (2,000 unit) capsule 11/6/2024 Spouse   Sig: Take 1 capsule (50 mcg) by mouth once daily.   clopidogrel (Plavix) 75 mg tablet 11/6/2024 Spouse   Sig: Take 1 tablet (75 mg) by mouth once daily.   escitalopram (Lexapro) 20 mg tablet 11/6/2024 Spouse   Sig: Take 0.5 tablets (10 mg) by mouth once daily.   famotidine (Pepcid) 40 mg tablet 11/6/2024 Spouse   Sig: Take 1 tablet (40 mg) by mouth once daily.   finasteride (Proscar) 5 mg tablet 11/6/2024 Spouse   Sig: Take 1 tablet (5 mg) by mouth once daily.   melatonin 5 mg tablet Past Week Spouse   Sig: Take 1 tablet (5 mg) by mouth once daily at bedtime.   metoprolol succinate XL (Toprol-XL) 25 mg 24 hr tablet 11/6/2024 Spouse   Sig: Take 1 tablet (25 mg) by mouth once daily. Do not crush or chew.   multivitamin tablet 11/6/2024 Spouse   Sig: Take 1 tablet by mouth once daily.   nitroglycerin (Nitrostat) 0.4 mg SL tablet Unknown Spouse   Sig: Place 1 tablet (0.4 mg) under the tongue every 5 minutes if needed.   omeprazole  (PriLOSEC) 40 mg DR capsule 11/6/2024 Spouse   Sig: Take 1 capsule (40 mg) by mouth once daily.   tamsulosin (Flomax) 0.4 mg 24 hr capsule 11/6/2024 Spouse   Sig: Take 1 capsule (0.4 mg) by mouth once daily.   torsemide 40 mg tablet 11/6/2024 Spouse   Sig: Take 40 mg by mouth once daily.      Facility-Administered Medications: None        The list below reflects the updated allergy list. Please review each documented allergy for additional clarification and justification.  Allergies  Reviewed by Kusum Arias RN on 11/6/2024   No Known Allergies         Patient accepts M2B at discharge. Pharmacy has been updated to [facility] pharmacy.    Sources used to complete the med history include:    [facility]  Pharmacy dispense history  Spouse, Good historian  Chart Review  Care Everywhere     Below are additional concerns with the patient's PTA list.  Spouse was able to assist with all medications, she also had a medication list from [facility].  Patient's Metoprolol has increased to 25mg once daily instead of 12.5 mg bid as of 11/1/24 (provided by spouse)  Patient's Torsemide has increased to 40 mg/day as of 10/14 (provided by spouse)  Potassium dose is 20 mEq daily.    Medications ADDED:  none  Medications CHANGED:  Torsemide dose  Potassium chloride dose  Metoprolol dose   Medications REMOVED:   none    Almaz Arreguin PharmD  Transitions of Care Pharmacist  [facility]  Please reach out via Secure Chat for questions, or if no response call Zazom or [place]

## 2024-11-07 NOTE — PROGRESS NOTES
HFICU Attending Note    Principal Problem:    Acute combined systolic and diastolic heart failure  Active Problems:    CAD (coronary artery disease)    Ventricular tachycardia (Multi)    PAD (peripheral artery disease) (CMS-HCC)    Ascending aortic aneurysm (CMS-HCC)    Myocardial infarct, old    Cardiomyopathy, ischemic    Former smoker    Referring cardiologist: Dr. Abel Gutierrez    Mr. Waite is a 74-year-old white male who is admitted for management of dyspnea on exertion, after an elective right heart catheterization showed significantly elevated filling pressures consistent with acute heart failure exacerbation.    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 In this setting he suffered cardiogenic shock, VT, and respiratory failure requiring intubation.    Over the last few weeks he has been feeling unwell with significant dyspnea on exertion with minimal activities (NYHA class IIIB-IV).     Recent evaluations:  Left heart cath - patent Left sided stents; No significant disease  Right heart cath - Co/CI= 4.5/2.6; RA mean 6; RV 55/3; PA 56/19, PCWP with large V wave- 19/43(mean 28)  Echocardiogram - LVEF 29%, LVIDD 5.69cm, moderate MR, moderate TR    Anemia (Hgb 9.5) + iron deficiency (ferritin 50, iron 28, % sat 7%)    Plan:  Hold loop diuretic  Continue BB  Optimize GDMT: start dapagliflozin 5 mg a day, and spironolactone 12.5mg a day  Iron deficiency anemia: treat with IV iron  Obtain cardiac MRI  In the coming days/weeks should reassess burden of MR, but only after he is on maximally tolerated GDMT  May need to consider ICD (primary prevention of SCD) in the coming days/weeks, especially if no early evidence of improved LVEF    This critically ill patient continues to be at-risk for clinically significant deterioration / failure due to the above mentioned  dysfunctional, unstable organ systems.  I have personally identified and managed all complex critical care issues to prevent aforementioned clinical deterioration.  Critical care time is spent at bedside and/or the immediate area and has included, but is not limited to, the review of diagnostic tests, labs, radiographs, serial assessments of hemodynamics, respiratory status, ventilatory management, and family updates.  Time spent in procedures and teaching are reported separately.    Critical care time: __40__ minutes     Jesus Leblanc MD, MPH  Advanced Heart Failure and Transplant Cardiology  [facility]

## 2024-11-07 NOTE — PROGRESS NOTES
Physical Therapy    Physical Therapy Evaluation    Patient Name: Anurag Waite  MRN: [id number]  Department: [facility] HFICU    Room: 07/07-A  Today's Date: 11/7/2024   Time Calculation  Start Time: 1010  Stop Time: 1040  Time Calculation (min): 30 min    Assessment/Plan   PT Assessment  PT Assessment Results: Decreased endurance  Rehab Prognosis: Good  Barriers to Discharge: none  Evaluation/Treatment Tolerance: Patient tolerated treatment well  Medical Staff Made Aware: Yes  End of Session Communication: Bedside nurse  End of Session Patient Position: Up in chair, Alarm off, not on at start of session  IP OR SWING BED PT PLAN  Inpatient or Swing Bed: Inpatient  PT Plan  Treatment/Interventions: Gait training, Stair training, Balance training, Strengthening, Endurance training, Therapeutic exercise  PT Plan: Ongoing PT  PT Frequency: 2 times per week  PT Discharge Recommendations: Low intensity level of continued care (cardiac rehab)  PT Recommended Transfer Status: Stand by assist  PT - OK to Discharge: Yes (pending team medical clearance)    Subjective   General Visit Information:  General  Reason for Referral: presents to HFICU post left and right heart cath for initiation of GDMT  Past Medical History Relevant to Rehab: h/o AAA (4 cm), PAD/RLE claudication s/p femoral artery endarterectomy, HF class 3 (not on GDMT), CAD s/p LCX PCI attempt c/b LM-LAD dissection, MI, VT, and hx of cardiogenic shock  Family/Caregiver Present: No  Prior to Session Communication: Bedside nurse (HF NP)  Patient Position Received: Up in chair, Alarm off, not on at start of session  General Comment: pt sitting up in chair, pleasant and agreeable to work with PT. EF 29%.    Home Living:  Home Living  Type of Home: House  Lives With: Spouse  Home Adaptive Equipment: None  Home Layout: One level  Home Access: Stairs to enter with rails  Entrance Stairs-Number of Steps: 3  Bathroom Shower/Tub: Walk-in shower  Home Living Comments: +dog. 3 KAM,  then first floor set up throughout.    Prior Level of Function:  Prior Function Per Pt/Caregiver Report  Level of [place]: Independent with ADLs and functional transfers, Independent with homemaking with ambulation  ADL Assistance: Independent  Homemaking Assistance: Independent  Ambulatory Assistance: Independent  Vocational: Retired  Leisure: woodworking, working outside in [place] yard [place] very active at baseline  Prior Function Comments: pt has been very active, and continues to be; however, has been limited in terms of endurance since July 2024, after his first heart procedure. Has still been able to complete all home activities, but does note incr SOB and LE fatigue, requiring more rest breaks.    Precautions:  Precautions  Medical Precautions: Cardiac precautions     Vitals  PRE: HR 64, spO2 97%, /81, RR 15  DURING: HR max 79  POST: /80, HR 65, RR 19        Objective   Pain:  Pain Assessment  Pain Assessment: 0-10  0-10 (Numeric) Pain Score: 0 - No pain  Cognition:  Cognition  Overall Cognitive Status: Within Functional Limits  Arousal/Alertness: Appropriate responses to stimuli  Orientation Level: Oriented X4  Following Commands: Follows all commands and directions without difficulty    General Assessments:     Activity Tolerance  Endurance: Tolerates 10 - 20 min exercise with multiple rests  Early Mobility/Exercise Safety Screen: Proceed with mobilization - No exclusion criteria met  Rate of Perceived Dyspnea (RPD): .5/10  Rate of Perceived Exertion (RPE): 9/20    Static Sitting Balance  Static Sitting-Balance Support: Feet supported, No upper extremity supported  Static Sitting-Level of Assistance: Independent  Dynamic Sitting Balance  Dynamic Sitting-Balance Support: No upper extremity supported, Feet supported  Dynamic Sitting-Level of Assistance: Independent    Static Standing Balance  Static Standing-Balance Support: No upper extremity  supported  Static Standing-Level of Assistance: Close supervision  Dynamic Standing Balance  Dynamic Standing-Balance Support: No upper extremity supported  Dynamic Standing-Level of Assistance: Close supervision  Functional Assessments:       Transfers  Transfer: Yes  Transfer 1  Transfer From 1: Sit to  Transfer to 1: Stand  Technique 1: Sit to stand  Transfer Level of Assistance 1: Close supervision  Transfers 2  Transfer From 2: Stand to  Transfer to 2: Sit  Technique 2: Stand to sit  Transfer Level of Assistance 2: Close supervision    Ambulation/Gait Training  Ambulation/Gait Training Performed: Yes  Ambulation/Gait Training 1  Surface 1: Level tile  Device 1: No device  Assistance 1: Close supervision  Quality of Gait 1: Narrow base of support  Comments/Distance (ft) 1: 20 ft, 350 ft  Extremity/Trunk Assessments:  RLE   RLE :  (ROM WFL; MMT 4/5 throughout)  LLE   LLE :  (ROM WFL; MMT 4/5 throughout)  Outcome Measures:  [facility] Basic Mobility  Turning from your back to your side while in a flat bed without using bedrails: None  Moving from lying on your back to sitting on the side of a flat bed without using bedrails: None  Moving to and from bed to chair (including a wheelchair): A little  Standing up from a chair using your arms (e.g. wheelchair or bedside chair): A little  To walk in hospital room: A little  Climbing 3-5 steps with railing: A little  Basic Mobility - Total Score: 20    FSS-ICU  Ambulation: Walks >/ or equal to 150 feet with supervision  Rolling: Complete independence  Sitting: Complete independence  Transfer Sit-to-Stand: Supervision or set-up only  Transfer Supine-to-Sit: Supervision or set-up only  Total Score: 29      Early Mobility/Exercise Safety Screen: Proceed with mobilization - No exclusion criteria met  ICU Mobility Scale: Walking with assistance of 1 person [8]  E = Exercise and Early Mobility  Early Mobility/Exercise Safety Screen: Proceed with mobilization - No exclusion criteria  met  ICU Mobility Scale: Walking with assistance of 1 person    Encounter Problems       Encounter Problems (Active)       PT Problem       Pt will perform a 6MWT, with distance greater than 1200 ft  with stable vitals, RPD 3/10 or less, RPE 13/20 or less.        Start:  11/07/24    Expected End:  11/28/24            Pt will perform a 5x STS, in 15 seconds or less, with RPD 3/10 or less, RPE 13/20 or less with stable vitals.        Start:  11/07/24    Expected End:  11/28/24            pt will negotiate 3 stairs indep with no device       Start:  11/07/24    Expected End:  11/28/24            pt will participate in 20 min of continuous activity with RPD 3/10 or less, RPE 13/20 or less       Start:  11/07/24    Expected End:  11/28/24                   Education Documentation  Mobility Training, taught by Eleonora Woo, PT at 11/7/2024  5:55 PM.  Learner: Patient  Readiness: Acceptance  Method: Explanation  Response: Verbalizes Understanding    Education Comments  No comments found.

## 2024-11-07 NOTE — PROGRESS NOTES
HFICU Attending Note    Principal Problem:    Acute combined systolic and diastolic heart failure  Active Problems:    CAD (coronary artery disease)    Diarrhea    Ventricular tachycardia (Multi)    PAD (peripheral artery disease) (CMS-[facility])    Ascending aortic aneurysm (CMS-[facility])    Myocardial infarct, old    Cardiomyopathy, ischemic    Former smoker    He was stable overnight. He is having diarrhea which he attributes to colitis that has been long-standing.    He tolerated initiation of SGLT2i and MRA without any obvious problems.    His SBP is 114 mmHg, and his creatinine is 1.2.    On exam he has no obvious JVD or LE edema.    -- Acutely decompensated heart failure: Improving  -- Colitis? Related diarrhea    Add sacubitril/valsartan 24/26mg BID  Consult GI regarding diarrhea  Continue low dose BB + MRA + SGLT2i    Continue hospital-based titration of GDMT.    Case discussed with Dr. Gutierrez (primary cardiologist).      This critically ill patient continues to be at-risk for clinically significant deterioration / failure due to the above mentioned dysfunctional, unstable organ systems.  I have personally identified and managed all complex critical care issues to prevent aforementioned clinical deterioration.  Critical care time is spent at bedside and/or the immediate area and has included, but is not limited to, the review of diagnostic tests, labs, radiographs, serial assessments of hemodynamics, respiratory status, ventilatory management, and family updates.  Time spent in procedures and teaching are reported separately.    Critical care time: __32__ minutes     Jesus Leblanc MD, MPH  Advanced Heart Failure and Transplant Cardiology  [facility]

## 2024-11-07 NOTE — PROGRESS NOTES
"[facility]  HFICU PROGRESS NOTE    Anurag Waite/[id number]    Admit Date: 11/6/2024  Hospital Length of Stay: 1   ICU Length of Stay: 18h   Primary Service: HFICU  Primary HF Cardiologist: Merry  Referring:    INTERVAL EVENTS / PERTINENT ROS:   -No acute events  -c/w farxiga, aldactone, metop succ   -added Entresto 24/26mg BID    MEDICATIONS  Infusions:     Scheduled:  aspirin, 81 mg, Daily  atorvastatin, 80 mg, Nightly  budesonide EC, 3 mg, Daily  calcium carbonate, 1 tablet, Daily  cholecalciferol, 2,000 Units, Daily  clopidogrel, 75 mg, Daily  dapagliflozin propanediol, 5 mg, Daily  enoxaparin, 40 mg, q24h  escitalopram, 10 mg, Daily  famotidine, 40 mg, Daily  finasteride, 5 mg, Daily  magnesium oxide, 800 mg, BID  melatonin, 10 mg, Nightly  metoprolol succinate XL, 12.5 mg, BID  sacubitriL-valsartan, 1 tablet, BID  spironolactone, 12.5 mg, Daily  tamsulosin, 0.4 mg, Daily      PRN:  acetaminophen, 650 mg, q6h PRN  ALPRAZolam, 0.5 mg, TID PRN  polyethylene glycol, 17 g, Daily PRN          PHYSICAL EXAM:   Visit Vitals  BP 99/69   Pulse 63   Temp 35.8 °C (96.4 °F) (Temporal)   Resp 14   Ht 1.702 m (5' 7.01\")   Wt 61.8 kg (136 lb 3.9 oz)   SpO2 100%   BMI 21.33 kg/m²   Smoking Status Former   BSA 1.71 m²     Wt Readings from Last 5 Encounters:   11/06/24 61.8 kg (136 lb 3.9 oz)   11/04/24 62.4 kg (137 lb 9.6 oz)     INTAKE/OUTPUT:  I/O last 3 completed shifts:  In: - (0 mL/kg)   Out: 560 (9.1 mL/kg) [Urine:550 (0.2 mL/kg/hr); Blood:10]  Weight: 61.8 kg    GEN:  HEENT:   CHEST: Unlabored, Diminished  CV:  Sinus bradycardia  ABD:      Bowel sounds:  , Flatus:    EXT:   RLE: Appropriate for ethnicity,Warm, Dry  DP:    PT: Moderate  LLE: Appropriate for ethnicity,Warm, Dry  DP:    PT: Moderate  NEURO:   RASS: Alert and calm  CAM: Negative  LOC:    Cognition:    GCS:      DATA:  CMP:  Recent Labs     11/07/24  0451 11/06/24  1756 11/04/24  1205    136 137   K 3.9 3.3* 3.2*    " "96* 97*   CO2 25 28 33*   ANIONGAP 15 15 10   BUN 18 16 19   CREATININE 1.22 1.02 1.04   EGFR 63 78 76   MG 1.83 1.70  --      Recent Labs     11/07/24 0451 11/06/24  1756   ALBUMIN 3.1* 3.5   ALT  --  33   AST  --  29   BILITOT  --  0.6     CBC:  Recent Labs     11/07/24 0451 11/06/24 1756 11/04/24  1205   WBC 6.2 6.6 5.3   HGB 9.2* 9.5* 9.8*   HCT 30.2* 31.6* 33.4*    361 380   MCV 78* 81 79*     COAG:   Recent Labs     11/06/24 1756   INR 1.2*     ABO:   Recent Labs     11/06/24 1756   ABO B     HEME/ENDO:  Recent Labs     11/06/24 1756   FERRITIN 50   IRONSAT 7*   TSH 1.24   HGBA1C 5.9*      CARDIAC:   Recent Labs     11/06/24 1756   TROPHS 27   *     No results for input(s): \"LACMX\", \"LACTATEART\", \"SO2MV\", \"O2CMX\" in the last 84100 hours.    No lab exists for component: \"S\"  No results for input(s): \"TACROLIMUS\", \"SIROLIMUS\", \"CYCLOSPORINE\" in the last 39508 hours.    EKG:  ICD Check:   Chest Radiograph  CT:   Echocardiogram:  Cardiac Catheterization:  Metabolic Stress:  PET:  NM SPECT:  MRI:     Prior to Admission Meds:  Medications Prior to Admission   Medication Sig Dispense Refill Last Dose/Taking    ALPRAZolam (Xanax) 0.5 mg tablet Take 1 tablet (0.5 mg) by mouth 3 times a day as needed.   11/6/2024    atorvastatin (Lipitor) 80 mg tablet Take 1 tablet (80 mg) by mouth once daily.   11/6/2024    budesonide EC (Entocort EC) 3 mg 24 hr capsule Take 1 capsule (3 mg) by mouth once daily.   11/6/2024    calcium carbonate (Tums) 200 mg calcium chewable tablet 1 tablet (500 mg) once daily.   11/6/2024    cholecalciferol, vitamin D3, 25 mcg (1,000 unit) tablet,chewable Chew 2 tablets (50 mcg) once daily.   11/6/2024    clopidogrel (Plavix) 75 mg tablet Take 1 tablet (75 mg) by mouth once daily.   11/6/2024    escitalopram (Lexapro) 20 mg tablet Take 0.5 tablets (10 mg) by mouth once daily.   11/6/2024    famotidine (Pepcid) 40 mg tablet Take 1 tablet (40 mg) by mouth once daily.   11/6/2024    " finasteride (Proscar) 5 mg tablet Take 1 tablet (5 mg) by mouth once daily.   11/6/2024    Klor-Con M10 10 mEq ER tablet Take 1 tablet (10 mEq) by mouth once daily.   11/6/2024    melatonin 10 mg tablet,chewable Chew 5 mg once daily.   Past Week    metoprolol succinate XL (Toprol-XL) 25 mg 24 hr tablet Take 0.5 tablets (12.5 mg) by mouth 2 times a day. Do not crush or chew.   11/6/2024    multivitamin tablet Take 1 tablet by mouth once daily.   11/6/2024    omeprazole (PriLOSEC) 40 mg DR capsule Take 1 capsule (40 mg) by mouth once daily.   11/6/2024    tamsulosin (Flomax) 0.4 mg 24 hr capsule Take 1 capsule (0.4 mg) by mouth once daily.   11/6/2024    torsemide (Demadex) 20 mg tablet Take 1 tablet (20 mg) by mouth once daily.   11/6/2024    nitroglycerin (Nitrostat) 0.4 mg SL tablet Place 1 tablet (0.4 mg) under the tongue.   Unknown       ASSESSMENT AND PLAN:   Principal Problem:    Acute combined systolic and diastolic heart failure  Active Problems:    Ventricular tachycardia (Multi)    PAD (peripheral artery disease) (CMS-HCC)    Ascending aortic aneurysm (CMS-HCC)    Myocardial infarct, old    Cardiomyopathy, ischemic    Former smoker    CAD (coronary artery disease)    Anurag Waite is a 74 y.o. male with a h/o AAA (4 cm), PAD/RLE claudication s/p femoral artery endarterectomy, HF class 3 (not on GDMT), CAD s/p LCX PCI attempt c/b LM-LAD dissection, MI, VT, and hx of cardiogenic shock who presents to HFICU post left and right heart cath for initiation of GDMT.      Neuro:  #Anxiety  - Serial neuro and pain assessments   - PO Tylenol PRN for pain  - PT/OT Consult, OOB to chair  - CAM ICU score every shift  - Sleep/wake cycle normalization  - C/w home ativan PRN for anxiety  - C/w home lexapro 10 mg daily   - C/w home melatonin 10 mg at bedtime     #Substance abuse  -Alcohol abuse/Alcohol dependence - drinks 14 cans of beer per week   -Tobacco use/Nicotine Dependence - quit smoking 7/8/2024      Cardiovascular:  #ICM HFrEF 25-30%  #Moderate MR, TR and AR   #HTN   - TTE 11/4/2024: LVSF 29%, LA and RA severely dilated, mild/moderate MR, TR, AR  - admit weight 62.4 kg  - admit BNP pending   - Right heart cath 11/6/2024- CO/CI= 4.5/2.6; RA mean 6; RV 55/3; PA 56/19, PCWP with large V wave- 19/43 (mean 28) swan removed in cath lab   - Hold diuresis as CVP was 3 in cath lab   - c/w Metoprolol succinate 12.5mg BID  - c/w Spironolactone 12.5 mg daily  - c/w Farxiga 5 mg daily   - will add low dose Entresto 24/26mg BID   - Daily standing weights, 2gm sodium diet, 2L fluid restriction, strict I&Os     #CAD   #PAD  #Hx of MI, PCI   - C 11/6/2024 - patent Left sided stents; No significant disease  - C/w atorvastatin 80 mg daily, plavix 75 mg daily and ASA 81mg daily      #Electrolyte Disturbances  - Maintain K >4, Mg >2      Pulmonary:   -No Hx of pulmonary disease/Pulmonary Hypertension  -Monitor and maintain SpO2 > 92%     GI:  #Hx of Ulcerative colitis   #Acute diarrhea  - C/w home Entocort   - Holding Bowel regimen: sri-colace BID, miralax PRN   - GI consult placed   - C/w protonix and pepcid daily      :  #BPH  - C/w home finasteride and tamsulosin  - Baseline BUN/Cr normal  - I/Os  - Avoid hypotension and nephrotoxic agents     Heme:  #Anemia   -Ferratin 50/394/7%, iron 28  -IV Venofer 200mg x 5 days ordered     Endo:  #No hx of DM  - Euglycemic  - hgbA1c 5.9     #Thyroid  - TSH 1.24      ID:  -afebrile, nontoxic   -no s/s infx  -trend temps q4h        PHYSICAL AND OCCUPATIONAL THERAPY: ordered and following      LINES:  PIVs      DVT: lovenox  VAP BUNDLE: NA  CENTRAL LINE BUNDLE: NA  ULCER PPX: PPI  GLYCEMIC CONTROL: NA  BOWEL CARE:sri-colace and miralax PRN  INDWELLING CATHETER: NA  NUTRITION: Adult diet Cardiac; 70 gm fat; 2 - 3 grams Sodium        EMERGENCY CONTACT: Extended Emergency Contact Information  Primary Emergency Contact: Genna Waite  Address: [address] [place] [address]  [place]  Home Phone: [phone]  Mobile Phone: [phone]  Relation: Spouse  FAMILY UPDATE: given at bedside  CODE STATUS: Full Code  DISPO: admit to HFICU -> likely transfer to floor this afternoon-> to [facility] with HF service as consult        Patient seen and assessed with Dr. Leblanc      I personally spent 60 minutes of critical care time directly and personally managing the patient exclusive of separately billable procedures     _________________________________________________  TRISHA WhiteheadCNP

## 2024-11-08 LAB
ALBUMIN SERPL BCP-MCNC: 3.1 G/DL (ref 3.4–5)
ANION GAP SERPL CALC-SCNC: 11 MMOL/L (ref 10–20)
ATRIAL RATE: 58 BPM
BASOPHILS # BLD AUTO: 0.06 X10*3/UL (ref 0–0.1)
BASOPHILS NFR BLD AUTO: 0.7 %
BUN SERPL-MCNC: 12 MG/DL (ref 6–23)
C COLI+JEJ+UPSA DNA STL QL NAA+PROBE: NOT DETECTED
CALCIUM SERPL-MCNC: 8.7 MG/DL (ref 8.6–10.6)
CHLORIDE SERPL-SCNC: 101 MMOL/L (ref 98–107)
CO2 SERPL-SCNC: 28 MMOL/L (ref 21–32)
CREAT SERPL-MCNC: 0.96 MG/DL (ref 0.5–1.3)
EC STX1 GENE STL QL NAA+PROBE: NOT DETECTED
EC STX2 GENE STL QL NAA+PROBE: NOT DETECTED
EGFRCR SERPLBLD CKD-EPI 2021: 83 ML/MIN/1.73M*2
EOSINOPHIL # BLD AUTO: 0.12 X10*3/UL (ref 0–0.4)
EOSINOPHIL NFR BLD AUTO: 1.4 %
ERYTHROCYTE [DISTWIDTH] IN BLOOD BY AUTOMATED COUNT: 19.1 % (ref 11.5–14.5)
GLUCOSE SERPL-MCNC: 118 MG/DL (ref 74–99)
HCT VFR BLD AUTO: 34.8 % (ref 41–52)
HGB BLD-MCNC: 10.3 G/DL (ref 13.5–17.5)
IMM GRANULOCYTES # BLD AUTO: 0.03 X10*3/UL (ref 0–0.5)
IMM GRANULOCYTES NFR BLD AUTO: 0.3 % (ref 0–0.9)
LYMPHOCYTES # BLD AUTO: 0.97 X10*3/UL (ref 0.8–3)
LYMPHOCYTES NFR BLD AUTO: 11.2 %
MAGNESIUM SERPL-MCNC: 2.15 MG/DL (ref 1.6–2.4)
MCH RBC QN AUTO: 23.8 PG (ref 26–34)
MCHC RBC AUTO-ENTMCNC: 29.6 G/DL (ref 32–36)
MCV RBC AUTO: 81 FL (ref 80–100)
MONOCYTES # BLD AUTO: 0.76 X10*3/UL (ref 0.05–0.8)
MONOCYTES NFR BLD AUTO: 8.8 %
NEUTROPHILS # BLD AUTO: 6.69 X10*3/UL (ref 1.6–5.5)
NEUTROPHILS NFR BLD AUTO: 77.6 %
NOROVIRUS GI + GII RNA STL NAA+PROBE: NOT DETECTED
NRBC BLD-RTO: 0 /100 WBCS (ref 0–0)
P AXIS: 34 DEGREES
P OFFSET: 199 MS
P ONSET: 148 MS
PHOSPHATE SERPL-MCNC: 3.5 MG/DL (ref 2.5–4.9)
PLATELET # BLD AUTO: 287 X10*3/UL (ref 150–450)
POTASSIUM SERPL-SCNC: 3.9 MMOL/L (ref 3.5–5.3)
PR INTERVAL: 152 MS
Q ONSET: 224 MS
QRS COUNT: 10 BEATS
QRS DURATION: 84 MS
QT INTERVAL: 406 MS
QTC CALCULATION(BAZETT): 398 MS
QTC FREDERICIA: 401 MS
R AXIS: -75 DEGREES
RBC # BLD AUTO: 4.32 X10*6/UL (ref 4.5–5.9)
RV RNA STL NAA+PROBE: NOT DETECTED
SALMONELLA DNA STL QL NAA+PROBE: NOT DETECTED
SHIGELLA DNA SPEC QL NAA+PROBE: NOT DETECTED
SODIUM SERPL-SCNC: 136 MMOL/L (ref 136–145)
T AXIS: 118 DEGREES
T OFFSET: 427 MS
V CHOLERAE DNA STL QL NAA+PROBE: NOT DETECTED
VENTRICULAR RATE: 58 BPM
WBC # BLD AUTO: 8.6 X10*3/UL (ref 4.4–11.3)
Y ENTEROCOL DNA STL QL NAA+PROBE: NOT DETECTED

## 2024-11-08 PROCEDURE — G0378 HOSPITAL OBSERVATION PER HR: HCPCS

## 2024-11-08 PROCEDURE — 96375 TX/PRO/DX INJ NEW DRUG ADDON: CPT

## 2024-11-08 PROCEDURE — 2500000004 HC RX 250 GENERAL PHARMACY W/ HCPCS (ALT 636 FOR OP/ED)

## 2024-11-08 PROCEDURE — 83735 ASSAY OF MAGNESIUM: CPT

## 2024-11-08 PROCEDURE — 2500000001 HC RX 250 WO HCPCS SELF ADMINISTERED DRUGS (ALT 637 FOR MEDICARE OP): Performed by: NURSE PRACTITIONER

## 2024-11-08 PROCEDURE — 2500000004 HC RX 250 GENERAL PHARMACY W/ HCPCS (ALT 636 FOR OP/ED): Performed by: STUDENT IN AN ORGANIZED HEALTH CARE EDUCATION/TRAINING PROGRAM

## 2024-11-08 PROCEDURE — 99291 CRITICAL CARE FIRST HOUR: CPT | Performed by: NURSE PRACTITIONER

## 2024-11-08 PROCEDURE — 80069 RENAL FUNCTION PANEL: CPT

## 2024-11-08 PROCEDURE — 2500000001 HC RX 250 WO HCPCS SELF ADMINISTERED DRUGS (ALT 637 FOR MEDICARE OP): Performed by: STUDENT IN AN ORGANIZED HEALTH CARE EDUCATION/TRAINING PROGRAM

## 2024-11-08 PROCEDURE — 99223 1ST HOSP IP/OBS HIGH 75: CPT | Performed by: STUDENT IN AN ORGANIZED HEALTH CARE EDUCATION/TRAINING PROGRAM

## 2024-11-08 PROCEDURE — 2500000001 HC RX 250 WO HCPCS SELF ADMINISTERED DRUGS (ALT 637 FOR MEDICARE OP)

## 2024-11-08 PROCEDURE — 1100000001 HC PRIVATE ROOM DAILY

## 2024-11-08 PROCEDURE — 36415 COLL VENOUS BLD VENIPUNCTURE: CPT

## 2024-11-08 PROCEDURE — 2500000002 HC RX 250 W HCPCS SELF ADMINISTERED DRUGS (ALT 637 FOR MEDICARE OP, ALT 636 FOR OP/ED)

## 2024-11-08 PROCEDURE — 2500000004 HC RX 250 GENERAL PHARMACY W/ HCPCS (ALT 636 FOR OP/ED): Performed by: NURSE PRACTITIONER

## 2024-11-08 PROCEDURE — 85025 COMPLETE CBC W/AUTO DIFF WBC: CPT

## 2024-11-08 RX ORDER — BUDESONIDE 3 MG/1
6 CAPSULE, COATED PELLETS ORAL ONCE
Status: COMPLETED | OUTPATIENT
Start: 2024-11-08 | End: 2024-11-08

## 2024-11-08 RX ORDER — BUDESONIDE 3 MG/1
9 CAPSULE, COATED PELLETS ORAL DAILY
Status: DISCONTINUED | OUTPATIENT
Start: 2024-11-09 | End: 2024-11-09 | Stop reason: HOSPADM

## 2024-11-08 RX ADMIN — METOPROLOL SUCCINATE 12.5 MG: 25 TABLET, EXTENDED RELEASE ORAL at 20:03

## 2024-11-08 RX ADMIN — Medication 10 MG: at 20:03

## 2024-11-08 RX ADMIN — ASPIRIN 81 MG: 81 TABLET, COATED ORAL at 08:58

## 2024-11-08 RX ADMIN — CLOPIDOGREL BISULFATE 75 MG: 75 TABLET ORAL at 08:58

## 2024-11-08 RX ADMIN — BUDESONIDE 6 MG: 3 CAPSULE, COATED PELLETS ORAL at 15:13

## 2024-11-08 RX ADMIN — MAGNESIUM OXIDE TAB 400 MG (241.3 MG ELEMENTAL MG) 800 MG: 400 (241.3 MG) TAB at 08:57

## 2024-11-08 RX ADMIN — FAMOTIDINE 40 MG: 20 TABLET ORAL at 08:58

## 2024-11-08 RX ADMIN — SACUBITRIL AND VALSARTAN 0.5 TABLET: 24; 26 TABLET, FILM COATED ORAL at 20:03

## 2024-11-08 RX ADMIN — CALCIUM CARBONATE (ANTACID) CHEW TAB 500 MG 500 MG: 500 CHEW TAB at 08:58

## 2024-11-08 RX ADMIN — SPIRONOLACTONE 12.5 MG: 25 TABLET, FILM COATED ORAL at 08:57

## 2024-11-08 RX ADMIN — MAGNESIUM OXIDE TAB 400 MG (241.3 MG ELEMENTAL MG) 800 MG: 400 (241.3 MG) TAB at 20:03

## 2024-11-08 RX ADMIN — BUDESONIDE 3 MG: 3 CAPSULE, COATED PELLETS ORAL at 08:58

## 2024-11-08 RX ADMIN — FINASTERIDE 5 MG: 5 TABLET, FILM COATED ORAL at 08:58

## 2024-11-08 RX ADMIN — Medication 2000 UNITS: at 08:58

## 2024-11-08 RX ADMIN — ENOXAPARIN SODIUM 40 MG: 100 INJECTION SUBCUTANEOUS at 08:58

## 2024-11-08 RX ADMIN — IRON SUCROSE 200 MG: 20 INJECTION, SOLUTION INTRAVENOUS at 08:58

## 2024-11-08 RX ADMIN — DAPAGLIFLOZIN 5 MG: 10 TABLET, FILM COATED ORAL at 08:57

## 2024-11-08 RX ADMIN — SACUBITRIL AND VALSARTAN 1 TABLET: 24; 26 TABLET, FILM COATED ORAL at 09:01

## 2024-11-08 RX ADMIN — ACETAMINOPHEN 650 MG: 325 TABLET ORAL at 04:39

## 2024-11-08 RX ADMIN — ATORVASTATIN CALCIUM 80 MG: 80 TABLET, FILM COATED ORAL at 20:03

## 2024-11-08 RX ADMIN — ESCITALOPRAM OXALATE 10 MG: 10 TABLET ORAL at 08:59

## 2024-11-08 RX ADMIN — LOPERAMIDE HYDROCHLORIDE 2 MG: 2 CAPSULE ORAL at 08:58

## 2024-11-08 RX ADMIN — PANTOPRAZOLE SODIUM 40 MG: 40 TABLET, DELAYED RELEASE ORAL at 09:01

## 2024-11-08 RX ADMIN — TAMSULOSIN HYDROCHLORIDE 0.4 MG: 0.4 CAPSULE ORAL at 08:59

## 2024-11-08 ASSESSMENT — COGNITIVE AND FUNCTIONAL STATUS - GENERAL
MOBILITY SCORE: 24
DAILY ACTIVITIY SCORE: 24
DAILY ACTIVITIY SCORE: 24
MOBILITY SCORE: 24

## 2024-11-08 ASSESSMENT — PAIN SCALES - GENERAL
PAINLEVEL_OUTOF10: 0 - NO PAIN
PAINLEVEL_OUTOF10: 4
PAINLEVEL_OUTOF10: 0 - NO PAIN

## 2024-11-08 ASSESSMENT — PAIN - FUNCTIONAL ASSESSMENT
PAIN_FUNCTIONAL_ASSESSMENT: 0-10

## 2024-11-08 ASSESSMENT — PAIN DESCRIPTION - LOCATION: LOCATION: HEAD

## 2024-11-08 NOTE — PROGRESS NOTES
Social Work Note   TALIA met with the patient today to introduce myself and to go over the SDOH and discharge planning  assessment that had been completed by nursing.   The patient reports no issues at this time. He had no concerns. He mentioned getting his dog back from his son in law when he is discharged.   I identified no SDOH issues or discharge issues at this time.   Talia will continue to follow.   PRABHA Silva

## 2024-11-08 NOTE — PROGRESS NOTES
[facility]  HFICU PROGRESS NOTE    Anurag Waite/[id number]    Admit Date: 11/6/2024  Hospital Length of Stay: 2   ICU Length of Stay: 1d 17h   Primary Service: HFICU  Primary HF Cardiologist: Merry  Referring:    INTERVAL EVENTS / PERTINENT ROS:     No acute overnight event, CT abdomen done over night:  with liquid stool is visualized throughout the colon to the level of the rectum without evidence of circumferential mural thickening. Findings compatible with fluid overload with partially visualized small bilateral pleural effusions, pulmonary edema, abdominal wall and mesenteric edema.severe atherosclerosis calcification of the abdominal aorta with a 3.1 cm infrarenal fusiform aneurysm.reveal some liquid stool and mural thickening, has 3.1 cm .     Slightly hypotension an bradycardial, lowest Maps 51, HR 41, SB, metop 12.5 mg and Entresto at night was on hold.     Plan:  - Metop Suc. 12.5 mg daily   - Entresto 24/26 mg 0.5 mg daily   - Budesonide 9 mg daily  - May transfer to floor when bed available     MEDICATIONS  Infusions:     Scheduled:  aspirin, 81 mg, Daily  atorvastatin, 80 mg, Nightly  budesonide EC, 6 mg, Once  [START ON 11/9/2024] budesonide EC, 9 mg, Daily  calcium carbonate, 1 tablet, Daily  cholecalciferol, 2,000 Units, Daily  clopidogrel, 75 mg, Daily  dapagliflozin propanediol, 5 mg, Daily  enoxaparin, 40 mg, q24h  escitalopram, 10 mg, Daily  famotidine, 40 mg, Daily  finasteride, 5 mg, Daily  iron sucrose, 200 mg, Daily  magnesium oxide, 800 mg, BID  melatonin, 10 mg, Nightly  metoprolol succinate XL, 12.5 mg, BID  pantoprazole, 40 mg, Daily before breakfast  sacubitriL-valsartan, 0.5 tablet, BID  spironolactone, 12.5 mg, Daily  tamsulosin, 0.4 mg, Daily      PRN:  acetaminophen, 650 mg, q6h PRN  ALPRAZolam, 0.5 mg, TID PRN  loperamide, 2 mg, 4x daily PRN  polyethylene glycol, 17 g, Daily PRN          PHYSICAL EXAM:   Visit Vitals  BP 78/68   Pulse 57   Temp 36.5 °C  "(97.7 °F) (Temporal)   Resp 18   Ht 1.702 m (5' 7.01\")   Wt 61.8 kg (136 lb 3.9 oz)   SpO2 100%   BMI 21.33 kg/m²   Smoking Status Former   BSA 1.71 m²     Wt Readings from Last 5 Encounters:   11/06/24 61.8 kg (136 lb 3.9 oz)   11/04/24 62.4 kg (137 lb 9.6 oz)     INTAKE/OUTPUT:  I/O last 3 completed shifts:  In: 300 (4.9 mL/kg) [P.O.:300]  Out: 1200 (19.4 mL/kg) [Urine:1200 (0.5 mL/kg/hr)]  Weight: 61.8 kg    GEN:  HEENT:   CHEST: Unlabored, Diminished, Fine crackles  CV:  Sinus bradycardia  ABD:      Bowel sounds:  , Flatus:    EXT:   RLE: Appropriate for ethnicity,Warm, Dry  DP:    PT: Moderate  LLE: Appropriate for ethnicity,Warm, Dry  DP:    PT: Moderate  NEURO:   RASS: Alert and calm  CAM: Negative  LOC:    Cognition:    GCS:      DATA:  CMP:  Recent Labs     11/08/24 0443 11/07/24 0451 11/06/24 1756 11/04/24  1205    136 136 137   K 3.9 3.9 3.3* 3.2*    100 96* 97*   CO2 28 25 28 33*   ANIONGAP 11 15 15 10   BUN 12 18 16 19   CREATININE 0.96 1.22 1.02 1.04   EGFR 83 63 78 76   MG 2.15 1.83 1.70  --      Recent Labs     11/08/24 0443 11/07/24 0451 11/06/24  1756   ALBUMIN 3.1* 3.1* 3.5   ALT  --   --  33   AST  --   --  29   BILITOT  --   --  0.6     CBC:  Recent Labs     11/08/24 0443 11/07/24 0451 11/06/24 1756 11/04/24  1205   WBC 8.6 6.2 6.6 5.3   HGB 10.3* 9.2* 9.5* 9.8*   HCT 34.8* 30.2* 31.6* 33.4*    347 361 380   MCV 81 78* 81 79*     COAG:   Recent Labs     11/06/24 1756   INR 1.2*     ABO:   Recent Labs     11/06/24 1756   ABO B     HEME/ENDO:  Recent Labs     11/06/24 1756   FERRITIN 50   IRONSAT 7*   TSH 1.24   HGBA1C 5.9*      CARDIAC:   Recent Labs     11/06/24 1756   TROPHS 27   *     No results for input(s): \"LACMX\", \"LACTATEART\", \"SO2MV\", \"O2CMX\" in the last 78273 hours.    No lab exists for component: \"S\"  No results for input(s): \"TACROLIMUS\", \"SIROLIMUS\", \"CYCLOSPORINE\" in the last 33907 hours.    Prior to Admission Meds:  Medications Prior to " Admission   Medication Sig Dispense Refill Last Dose/Taking    ALPRAZolam (Xanax) 0.5 mg tablet Take 1 tablet (0.5 mg) by mouth 3 times a day as needed.   11/6/2024    atorvastatin (Lipitor) 80 mg tablet Take 1 tablet (80 mg) by mouth once daily.   11/6/2024    budesonide EC (Entocort EC) 3 mg 24 hr capsule Take 1 capsule (3 mg) by mouth once daily.   11/6/2024    cholecalciferol (Vitamin D-3) 50 mcg (2,000 unit) capsule Take 1 capsule (50 mcg) by mouth once daily.   11/6/2024    clopidogrel (Plavix) 75 mg tablet Take 1 tablet (75 mg) by mouth once daily.   11/6/2024    escitalopram (Lexapro) 20 mg tablet Take 0.5 tablets (10 mg) by mouth once daily.   11/6/2024    famotidine (Pepcid) 40 mg tablet Take 1 tablet (40 mg) by mouth once daily.   11/6/2024    finasteride (Proscar) 5 mg tablet Take 1 tablet (5 mg) by mouth once daily.   11/6/2024    Klor-Con M10 10 mEq ER tablet Take 2 tablets (20 mEq) by mouth once daily.   11/6/2024    melatonin 5 mg tablet Take 1 tablet (5 mg) by mouth once daily at bedtime.   Past Week    metoprolol succinate XL (Toprol-XL) 25 mg 24 hr tablet Take 1 tablet (25 mg) by mouth once daily. Do not crush or chew.   11/6/2024    multivitamin tablet Take 1 tablet by mouth once daily.   11/6/2024    omeprazole (PriLOSEC) 40 mg DR capsule Take 1 capsule (40 mg) by mouth once daily.   11/6/2024    tamsulosin (Flomax) 0.4 mg 24 hr capsule Take 1 capsule (0.4 mg) by mouth once daily.   11/6/2024    torsemide 40 mg tablet Take 40 mg by mouth once daily.   11/6/2024    calcium carbonate-vitamin D3 (Oyster Shell Calcium-Vit D3) 500 mg-5 mcg (200 unit) tablet Take 1 tablet by mouth 2 times a day.       nitroglycerin (Nitrostat) 0.4 mg SL tablet Place 1 tablet (0.4 mg) under the tongue every 5 minutes if needed.   Unknown       ASSESSMENT AND PLAN:     Anurag Waite is a 74 y.o. male with a h/o AAA (4 cm), PAD/RLE claudication s/p femoral artery endarterectomy, HF class 3 (not on GDMT), CAD s/p LCX PCI  attempt c/b LM-LAD dissection, MI, VT, and hx of cardiogenic shock who presents to HFICU post left and right heart cath for initiation of GDMT. Right heart cath 11/6/2024- CO/CI= 4.5/2.6; RA mean 6; RV 55/3; PA 56/19, PCWP with large V wave- 19/43 (mean 28) swan removed in cath lab. Admitted to HF ICU for initiation of heart failure GDMT. Now on   Metoprolol succinate 12.5mg daily, Spironolactone 12.5 mg daily, Farxiga 5 mg daily. Reduced  Entresto to 12/13 mg BID  11/8 due to hypotension at night.      Patient has diarrhea, CT abdomen done 11/7, CT reveals: Liquid stool is visualized throughout the colon to the level of the rectum without evidence of circumferential mural thickening. Findings compatible with fluid overload with partially visualized small bilateral pleural effusions, pulmonary edema, abdominal wall and mesenteric edema.severe atherosclerosis calcification of the abdominal aorta with a 3.1 cm infrarenal fusiform aneurysm. Consulted GI, per GI:  patient has history of microscopic colitis   Recs Budesonide 9 mg daily,  was on home dose at 3 mg.    Patient HD stable, for transfer to floor today.      Neuro:  #Anxiety  - Serial neuro and pain assessments   - PO Tylenol PRN for pain  - PT/OT Consult, OOB to chair  - CAM ICU score every shift  - Sleep/wake cycle normalization  - C/w home ativan PRN for anxiety  - C/w home lexapro 10 mg daily   - C/w home melatonin 10 mg at bedtime     #Substance abuse  -Alcohol abuse/Alcohol dependence - drinks 14 cans of beer per week   -Tobacco use/Nicotine Dependence - quit smoking 7/8/2024     Cardiovascular:  #ICM HFrEF 25-30%  #Moderate MR, TR and AR   #HTN   - TTE 11/4/2024: LVSF 29%, LA and RA severely dilated, mild/moderate MR, TR, AR  - admit weight 62.4 kg  - admit BNP pending   - Right heart cath 11/6/2024- CO/CI= 4.5/2.6; RA mean 6; RV 55/3; PA 56/19, PCWP with large V wave- 19/43 (mean 28) swan removed in cath lab   - Hold diuresis as CVP was 3 in cath lab    - c/w Metoprolol succinate 12.5mg daily   - Budesonide 9 mg daily today per GI recs   - c/w Spironolactone 12.5 mg daily  - c/w Farxiga 5 mg daily   - Reduced  Entresto to 12/13 mg BID  11/8   - Daily standing weights, 2gm sodium diet, 2L fluid restriction, strict I&Os     #CAD   #PAD  #Hx of MI, PCI   - [facility] 11/6/2024 - patent Left sided stents; No significant disease  - C/w atorvastatin 80 mg daily, plavix 75 mg daily and ASA 81mg daily      #Electrolyte Disturbances  - Maintain K >4, Mg >2      Pulmonary:   -No Hx of pulmonary disease/Pulmonary Hypertension  -Monitor and maintain SpO2 > 92%     GI:  #Hx of Ulcerative colitis   #Acute diarrhea  - C/w home Entocort   - Holding Bowel regimen: sri-colace BID, miralax PRN   - GI consulted, thanks for recs  - Up Budesonide 9 mg daily today 11/8, home dose was 3 mg daily   - CT abdomen (11/7):  Liquid stool is visualized throughout the colon to the level of  the rectum without evidence of circumferential mural thickening. Findings compatible with fluid overload with partially visualized small bilateral pleural effusions, pulmonary edema, abdominal wall  and mesenteric edema.severe atherosclerosis calcification of the abdominal aorta with a 3.1 cm  infrarenal fusiform aneurysm.  - C/w protonix and pepcid daily      :  #BPH  - C/w home finasteride and tamsulosin  - Baseline BUN/Cr normal  - I/Os  - Avoid hypotension and nephrotoxic agents     Heme:  #Anemia   -Ferratin 50/394/7%, iron 28  -IV Venofer 200mg x 5 days ordered     Endo:  #No hx of DM  - Euglycemic  - hgbA1c 5.9     #Thyroid  - TSH 1.24      ID:  -afebrile, nontoxic   -no s/s infx  -trend temps q4h        PHYSICAL AND OCCUPATIONAL THERAPY: ordered and following      LINES:  PIVs      DVT: lovenox  VAP BUNDLE: NA  CENTRAL LINE BUNDLE: NA  ULCER PPX: PPI  GLYCEMIC CONTROL: NA  BOWEL CARE:sri-colace and miralax PRN  INDWELLING CATHETER: NA  NUTRITION: Adult diet Cardiac; 70 gm fat; 2 - 3 grams Sodium         EMERGENCY CONTACT: Extended Emergency Contact Information  Primary Emergency Contact: Genna Waite  Address: [address] [place] [address] [facility]  Home Phone: [phone]  Mobile Phone: [phone]  Relation: Spouse  FAMILY UPDATE: given at bedside  CODE STATUS: Full Code  DISPO:    Transfer to floor when bed available     Patient seen and assessed with Dr. Leblanc      I personally spent 60 minutes of critical care time directly and personally managing the patient exclusive of separately billable procedures     _________________________________________________  CAROLINA Julian

## 2024-11-08 NOTE — CONSULTS
[facility]   [facility]  INITIAL CONSULT NOTE       Reason For Consult: diarrhea    SUBJECTIVE     History Of Present Illness  Anurag Waite is a 74 y.o. male with a past medical history of microscopic colitis and newly diagnosed ischemic cardiomyopathy and CAD/ PAD admitted on 11/6/2024 with acute decompensated heart failure. GI is consulted for acute on chronic diarrhea.    Patient states that he has been following at [facility] since at least 2013 for chronic diarrhea. He states that he was diagnosed with colitis and treated with budesonide in the past. He denies associated abdominal pain, nausea/ vomiting. Patient states that his symptoms have been worsening since July. Prior to that, he was having 2 episodes of diarrhea daily. Since that time, he endorses 6-8 large watery bowel movements daily (Oneco 7) with nighttime awakenings. He has lost weight during this time, but relates this to low appetite and his cardiac comorbidities.   He is taking budesonide 3mg daily.    Otherwise, today, patient is feeling well. He had 2 watery bowel movements yesterday, which is likely related to taking anti-diarrheal medication. He denies recent NSAID, antibiotic use. He does take a high intensity statin and a PPI daily.     Review of Systems: negative except as per HPI     Past Medical History:    Past Medical History:   Diagnosis Date    BPH (benign prostatic hyperplasia)     CHF (congestive heart failure)     Coronary artery disease     HLD (hyperlipidemia)     Hypertension     Ischemic cardiomyopathy     MI (myocardial infarction) (Multi)     PAD (peripheral artery disease) (CMS-[facility])     Ulcerative colitis     VT (ventricular tachycardia) (Multi)        Home Medications  Medications Prior to Admission   Medication Sig Dispense Refill Last Dose/Taking    ALPRAZolam (Xanax) 0.5 mg tablet Take 1 tablet (0.5 mg) by mouth 3 times a day as needed.   11/6/2024    atorvastatin  (Lipitor) 80 mg tablet Take 1 tablet (80 mg) by mouth once daily.   11/6/2024    budesonide EC (Entocort EC) 3 mg 24 hr capsule Take 1 capsule (3 mg) by mouth once daily.   11/6/2024    cholecalciferol (Vitamin D-3) 50 mcg (2,000 unit) capsule Take 1 capsule (50 mcg) by mouth once daily.   11/6/2024    clopidogrel (Plavix) 75 mg tablet Take 1 tablet (75 mg) by mouth once daily.   11/6/2024    escitalopram (Lexapro) 20 mg tablet Take 0.5 tablets (10 mg) by mouth once daily.   11/6/2024    famotidine (Pepcid) 40 mg tablet Take 1 tablet (40 mg) by mouth once daily.   11/6/2024    finasteride (Proscar) 5 mg tablet Take 1 tablet (5 mg) by mouth once daily.   11/6/2024    Klor-Con M10 10 mEq ER tablet Take 2 tablets (20 mEq) by mouth once daily.   11/6/2024    melatonin 5 mg tablet Take 1 tablet (5 mg) by mouth once daily at bedtime.   Past Week    metoprolol succinate XL (Toprol-XL) 25 mg 24 hr tablet Take 1 tablet (25 mg) by mouth once daily. Do not crush or chew.   11/6/2024    multivitamin tablet Take 1 tablet by mouth once daily.   11/6/2024    omeprazole (PriLOSEC) 40 mg DR capsule Take 1 capsule (40 mg) by mouth once daily.   11/6/2024    tamsulosin (Flomax) 0.4 mg 24 hr capsule Take 1 capsule (0.4 mg) by mouth once daily.   11/6/2024    torsemide 40 mg tablet Take 40 mg by mouth once daily.   11/6/2024    calcium carbonate-vitamin D3 (Oyster Shell Calcium-Vit D3) 500 mg-5 mcg (200 unit) tablet Take 1 tablet by mouth 2 times a day.       nitroglycerin (Nitrostat) 0.4 mg SL tablet Place 1 tablet (0.4 mg) under the tongue every 5 minutes if needed.   Unknown       Surgical History:    Past Surgical History:   Procedure Laterality Date    CAROTID ENDARTERECTOMY      CORONARY ANGIOPLASTY      HERNIA REPAIR         Allergies:  No Known Allergies    Social History:    Social History     Socioeconomic History    Marital status: Unknown     Spouse name: Not on file    Number of children: Not on file    Years of education:  Not on file    Highest education level: Not on file   Occupational History    Not on file   Tobacco Use    Smoking status: Former     Current packs/day: 0.00     Types: Cigarettes     Quit date: 2024     Years since quittin.3    Smokeless tobacco: Never   Vaping Use    Vaping status: Never Used   Substance and Sexual Activity    Alcohol use: Yes     Alcohol/week: 14.0 standard drinks of alcohol     Types: 14 Cans of beer per week    Drug use: Never    Sexual activity: Defer   Other Topics Concern    Not on file   Social History Narrative    Not on file     Social Drivers of Health     Financial Resource Strain: Low Risk  (2024)    Overall Financial Resource Strain (CARDIA)     Difficulty of Paying Living Expenses: Not hard at all   Food Insecurity: No Food Insecurity (2024)    Hunger Vital Sign     Worried About Running Out of Food in the Last Year: Never true     Ran Out of Food in the Last Year: Never true   Transportation Needs: No Transportation Needs (2024)    PRAPARE - Transportation     Lack of Transportation (Medical): No     Lack of Transportation (Non-Medical): No   Physical Activity: Sufficiently Active (2024)    Exercise Vital Sign     Days of Exercise per Week: 7 days     Minutes of Exercise per Session: 60 min   Stress: No Stress Concern Present (2024)    [employer] [place] [employer] - Occupational Stress Questionnaire     Feeling of Stress : Only a little   Social Connections: Not on file   Intimate Partner Violence: Not At Risk (2024)    Humiliation, Afraid, Rape, and Kick questionnaire     Fear of Current or Ex-Partner: No     Emotionally Abused: No     Physically Abused: No     Sexually Abused: No   Housing Stability: Low Risk  (2024)    Housing Stability Vital Sign     Unable to Pay for Housing in the Last Year: No     Number of Times Moved in the Last Year: 0     Homeless in the Last Year: No     Family History:    Family History   Problem  Relation Name Age of Onset    Heart attack Father      Coronary artery disease Brother      History of coronary artery bypass graft Brother       EXAM     Vitals:    Vitals:    11/08/24 0418 11/08/24 0500 11/08/24 0600 11/08/24 0900   BP:  96/63 89/54    Pulse:  54 (!) 48    Resp:  16 13    Temp: 36.2 °C (97.2 °F)   36.5 °C (97.7 °F)   TempSrc:    Temporal   SpO2:  95% 95%    Weight:       Height:         Failed to redirect to the Timeline version of the [employer] SmartLink.    Intake/Output Summary (Last 24 hours) at 11/8/2024 1000  Last data filed at 11/7/2024 2000  Gross per 24 hour   Intake 300 ml   Output 650 ml   Net -350 ml         Physical Exam  General: well-nourished, no acute distress  HEENT: EOM intact, no scleral icterus, moist MM  Respiratory: nonlabored breathing on room air  Cardiovascular: RRR  Abdomen: Soft, nontender, nondistended, bowel sounds present. No masses palpated  Extremities: no edema, no asterixis  Neuro: alert and oriented, CNII-XII grossly intact, moves all 4 extremities with no focal deficits  Psych: calm and cooperative    OBJECTIVE                                                                              Medications       Current Facility-Administered Medications:     acetaminophen (Tylenol) tablet 650 mg, 650 mg, oral, q6h PRN, CAROLINA Miles, 650 mg at 11/08/24 0439    ALPRAZolam (Xanax) tablet 0.5 mg, 0.5 mg, oral, TID PRN, CAROLINA Miles    aspirin EC tablet 81 mg, 81 mg, oral, Daily, CAROLINA Whitehead, 81 mg at 11/08/24 0858    atorvastatin (Lipitor) tablet 80 mg, 80 mg, oral, Nightly, CAROLINA Miles, 80 mg at 11/07/24 2044    budesonide EC (Entocort EC) 24 hr capsule 3 mg, 3 mg, oral, Daily, CAROLINA Miles, 3 mg at 11/08/24 0858    calcium carbonate (Tums) chewable tablet 500 mg, 1 tablet, oral, Daily, CAROLINA Miles, 500 mg at 11/08/24 0858    cholecalciferol (Vitamin D-3) tablet 2,000 Units, 2,000  Units, oral, Daily, CAROLINA Miles, 2,000 Units at 11/08/24 0858    clopidogrel (Plavix) tablet 75 mg, 75 mg, oral, Daily, CAROLINA Miles, 75 mg at 11/08/24 0858    dapagliflozin propanediol (Farxiga) tablet 5 mg, 5 mg, oral, Daily, CAROLINA Whitehead, 5 mg at 11/08/24 0857    enoxaparin (Lovenox) syringe 40 mg, 40 mg, subcutaneous, q24h, CAROLINA Miles, 40 mg at 11/08/24 0858    escitalopram (Lexapro) tablet 10 mg, 10 mg, oral, Daily, CAROLINA Miles, 10 mg at 11/08/24 0859    famotidine (Pepcid) tablet 40 mg, 40 mg, oral, Daily, CAROLINA Miles, 40 mg at 11/08/24 0858    finasteride (Proscar) tablet 5 mg, 5 mg, oral, Daily, CAROLINA Miles, 5 mg at 11/08/24 0858    iron sucrose (Venofer) injection 200 mg, 200 mg, intravenous, Daily, CAROLINA Whitehead, 200 mg at 11/08/24 0858    loperamide (Imodium) capsule 2 mg, 2 mg, oral, 4x daily PRN, CAROLINA Whitehead, 2 mg at 11/08/24 0858    magnesium oxide (Mag-Ox) tablet 800 mg, 800 mg, oral, BID, CAROLINA Julian, 800 mg at 11/08/24 0857    melatonin tablet 10 mg, 10 mg, oral, Nightly, CAROLINA Miles, 10 mg at 11/07/24 2043    metoprolol succinate XL (Toprol-XL) 24 hr tablet 12.5 mg, 12.5 mg, oral, BID, CAROLINA Julian, 12.5 mg at 11/07/24 1022    pantoprazole (ProtoNix) EC tablet 40 mg, 40 mg, oral, Daily before breakfast, TRISHA WhiteheadCNP, 40 mg at 11/08/24 0901    polyethylene glycol (Glycolax, Miralax) packet 17 g, 17 g, oral, Daily PRN, CAROLINA Miles    sacubitriL-valsartan (Entresto) 24-26 mg per tablet 1 tablet, 1 tablet, oral, BID, CAROLINA Whitehead, 1 tablet at 11/08/24 0901    spironolactone (Aldactone) tablet 12.5 mg, 12.5 mg, oral, Daily, CAROLINA Miles, 12.5 mg at 11/08/24 0857    tamsulosin (Flomax) 24 hr capsule 0.4 mg, 0.4 mg, oral, Daily, CAROLINA Miles, 0.4 mg at  11/08/24 0859                                                                            Labs     Results for orders placed or performed during the hospital encounter of 11/06/24 (from the past 24 hours)   C. difficile, PCR    Specimen: Stool   Result Value Ref Range    C. difficile, PCR Not Detected Not Detected   Stool Pathogen Panel, PCR    Specimen: Stool   Result Value Ref Range    Campylobacter Group Not Detected Not Detected    Salmonella species Not Detected Not Detected    Shigella species Not Detected Not Detected    Vibrio Group Not Detected Not Detected    Yersinia Enterocolitica Not Detected Not Detected    Shiga Toxin 1 Not Detected Not Detected    Shiga Toxin 2 Not Detected Not Detected    Norovirus GI/GII Not Detected Not Detected    Rotavirus A Not Detected Not Detected   CBC and Auto Differential   Result Value Ref Range    WBC 8.6 4.4 - 11.3 x10*3/uL    nRBC 0.0 0.0 - 0.0 /100 WBCs    RBC 4.32 (L) 4.50 - 5.90 x10*6/uL    Hemoglobin 10.3 (L) 13.5 - 17.5 g/dL    Hematocrit 34.8 (L) 41.0 - 52.0 %    MCV 81 80 - 100 fL    MCH 23.8 (L) 26.0 - 34.0 pg    MCHC 29.6 (L) 32.0 - 36.0 g/dL    RDW 19.1 (H) 11.5 - 14.5 %    Platelets 287 150 - 450 x10*3/uL    Neutrophils % 77.6 40.0 - 80.0 %    Immature Granulocytes %, Automated 0.3 0.0 - 0.9 %    Lymphocytes % 11.2 13.0 - 44.0 %    Monocytes % 8.8 2.0 - 10.0 %    Eosinophils % 1.4 0.0 - 6.0 %    Basophils % 0.7 0.0 - 2.0 %    Neutrophils Absolute 6.69 (H) 1.60 - 5.50 x10*3/uL    Immature Granulocytes Absolute, Automated 0.03 0.00 - 0.50 x10*3/uL    Lymphocytes Absolute 0.97 0.80 - 3.00 x10*3/uL    Monocytes Absolute 0.76 0.05 - 0.80 x10*3/uL    Eosinophils Absolute 0.12 0.00 - 0.40 x10*3/uL    Basophils Absolute 0.06 0.00 - 0.10 x10*3/uL   Renal function panel   Result Value Ref Range    Glucose 118 (H) 74 - 99 mg/dL    Sodium 136 136 - 145 mmol/L    Potassium 3.9 3.5 - 5.3 mmol/L    Chloride 101 98 - 107 mmol/L    Bicarbonate 28 21 - 32 mmol/L    Anion Gap  11 10 - 20 mmol/L    Urea Nitrogen 12 6 - 23 mg/dL    Creatinine 0.96 0.50 - 1.30 mg/dL    eGFR 83 >60 mL/min/1.73m*2    Calcium 8.7 8.6 - 10.6 mg/dL    Phosphorus 3.5 2.5 - 4.9 mg/dL    Albumin 3.1 (L) 3.4 - 5.0 g/dL   Magnesium   Result Value Ref Range    Magnesium 2.15 1.60 - 2.40 mg/dL                                                                              Imaging           CT abdomen pelvis w IV contrast 11/7/24  Impression: 1. Liquid stool is visualized throughout the colon to the level of the rectum without evidence of circumferential mural thickening. Finding is in keeping with patient's reported history of diarrhea, correlate with stool analysis to rule out underlying colitis  2. Focal region of hyperdense intraluminal contents within the cecum may relate to oral ingested content, hemorrhage is felt to be less likely given the morphology, however not entirely excluded. Correlation with patient's symptomology is recommended.  3. Findings compatible with fluid overload with partially visualized small bilateral pleural effusions, pulmonary edema, abdominal wall and mesenteric edema.  4. Prostatomegaly, correlate with PSA.  5. Additional incidental findings as described above including partially imaged cardiomegaly, coronary artery calcification, severe  atherosclerosis calcification of the abdominal aorta with a 3.1 cm infrarenal fusiform aneurysm.                                                                           GI Procedures   Colonoscopy 2019  Collagenous colitis  2 hyperplastic polyps in the rectum    ASSESSMENT / PLAN                  ASSESSMENT/PLAN:  Anurag Waite is a 74 y.o. male admitted on 11/6/2024 with acute decompensated heart failure. GI is consulted for acute on chronic diarrhea. Patient has biopsy proven collagenous colitis and is on chronic low dose budesonide. Worsening symptoms may related to chronic PPI and/or high intensity statin use, though neither of these medications can be  discontinued. Patient has responded to higher doses of budesonide in the past and is amenable to using this medication again.    Recommendations:  -Please start patient on budesonide 9 mg oral daily. He should take this for 1 month. Taper to be managed by outpatient GI team.  -Anti-diarrheals as needed     Patient was seen and discussed with Dr. Wade. Plan of care discussed with patient's primary gastroenterologist Kusum Eng NP at [facility].    Recommendations communicated with the primary team via secure chat.  Thank you for the consultation.  GI will sign off.  Please do not hesitate to contact us again on Epic Chat or by paging the consultation team at [phone] between the weekday hours of 7 AM - 5 PM.  If there is an urgent concern during the weekend, after-hours, or holidays; then please page the on-call GI fellow at [phone]. Thank you.      Renay Brown MD  Gastroenterology and Hepatology Fellow  [facility]

## 2024-11-09 ENCOUNTER — PHARMACY VISIT (OUTPATIENT)
Dept: PHARMACY | Facility: CLINIC | Age: 74
End: 2024-11-09
Payer: MEDICARE

## 2024-11-09 VITALS
SYSTOLIC BLOOD PRESSURE: 102 MMHG | DIASTOLIC BLOOD PRESSURE: 61 MMHG | WEIGHT: 135.36 LBS | BODY MASS INDEX: 21.25 KG/M2 | TEMPERATURE: 97.7 F | HEART RATE: 64 BPM | RESPIRATION RATE: 18 BRPM | HEIGHT: 67 IN | OXYGEN SATURATION: 95 %

## 2024-11-09 LAB
ALBUMIN SERPL BCP-MCNC: 3 G/DL (ref 3.4–5)
ANION GAP SERPL CALC-SCNC: 12 MMOL/L (ref 10–20)
BASOPHILS # BLD AUTO: 0.03 X10*3/UL (ref 0–0.1)
BASOPHILS NFR BLD AUTO: 0.5 %
BUN SERPL-MCNC: 8 MG/DL (ref 6–23)
CALCIUM SERPL-MCNC: 8.7 MG/DL (ref 8.6–10.6)
CHLORIDE SERPL-SCNC: 103 MMOL/L (ref 98–107)
CO2 SERPL-SCNC: 26 MMOL/L (ref 21–32)
CREAT SERPL-MCNC: 0.78 MG/DL (ref 0.5–1.3)
EGFRCR SERPLBLD CKD-EPI 2021: >90 ML/MIN/1.73M*2
EOSINOPHIL # BLD AUTO: 0.08 X10*3/UL (ref 0–0.4)
EOSINOPHIL NFR BLD AUTO: 1.4 %
ERYTHROCYTE [DISTWIDTH] IN BLOOD BY AUTOMATED COUNT: 19 % (ref 11.5–14.5)
GLUCOSE SERPL-MCNC: 84 MG/DL (ref 74–99)
HCT VFR BLD AUTO: 32.6 % (ref 41–52)
HGB BLD-MCNC: 9.4 G/DL (ref 13.5–17.5)
IMM GRANULOCYTES # BLD AUTO: 0.01 X10*3/UL (ref 0–0.5)
IMM GRANULOCYTES NFR BLD AUTO: 0.2 % (ref 0–0.9)
LYMPHOCYTES # BLD AUTO: 0.86 X10*3/UL (ref 0.8–3)
LYMPHOCYTES NFR BLD AUTO: 15.3 %
MAGNESIUM SERPL-MCNC: 2.3 MG/DL (ref 1.6–2.4)
MCH RBC QN AUTO: 23.7 PG (ref 26–34)
MCHC RBC AUTO-ENTMCNC: 28.8 G/DL (ref 32–36)
MCV RBC AUTO: 82 FL (ref 80–100)
MONOCYTES # BLD AUTO: 0.63 X10*3/UL (ref 0.05–0.8)
MONOCYTES NFR BLD AUTO: 11.2 %
NEUTROPHILS # BLD AUTO: 4 X10*3/UL (ref 1.6–5.5)
NEUTROPHILS NFR BLD AUTO: 71.4 %
NRBC BLD-RTO: 0 /100 WBCS (ref 0–0)
PHOSPHATE SERPL-MCNC: 3.2 MG/DL (ref 2.5–4.9)
PLATELET # BLD AUTO: 329 X10*3/UL (ref 150–450)
POTASSIUM SERPL-SCNC: 4 MMOL/L (ref 3.5–5.3)
RBC # BLD AUTO: 3.97 X10*6/UL (ref 4.5–5.9)
SODIUM SERPL-SCNC: 137 MMOL/L (ref 136–145)
WBC # BLD AUTO: 5.6 X10*3/UL (ref 4.4–11.3)

## 2024-11-09 PROCEDURE — 83735 ASSAY OF MAGNESIUM: CPT | Performed by: STUDENT IN AN ORGANIZED HEALTH CARE EDUCATION/TRAINING PROGRAM

## 2024-11-09 PROCEDURE — 2500000004 HC RX 250 GENERAL PHARMACY W/ HCPCS (ALT 636 FOR OP/ED): Performed by: STUDENT IN AN ORGANIZED HEALTH CARE EDUCATION/TRAINING PROGRAM

## 2024-11-09 PROCEDURE — 85025 COMPLETE CBC W/AUTO DIFF WBC: CPT | Performed by: STUDENT IN AN ORGANIZED HEALTH CARE EDUCATION/TRAINING PROGRAM

## 2024-11-09 PROCEDURE — 2500000001 HC RX 250 WO HCPCS SELF ADMINISTERED DRUGS (ALT 637 FOR MEDICARE OP): Performed by: STUDENT IN AN ORGANIZED HEALTH CARE EDUCATION/TRAINING PROGRAM

## 2024-11-09 PROCEDURE — 80069 RENAL FUNCTION PANEL: CPT | Performed by: STUDENT IN AN ORGANIZED HEALTH CARE EDUCATION/TRAINING PROGRAM

## 2024-11-09 PROCEDURE — 96375 TX/PRO/DX INJ NEW DRUG ADDON: CPT

## 2024-11-09 PROCEDURE — RXMED WILLOW AMBULATORY MEDICATION CHARGE

## 2024-11-09 PROCEDURE — G0378 HOSPITAL OBSERVATION PER HR: HCPCS

## 2024-11-09 PROCEDURE — 2500000002 HC RX 250 W HCPCS SELF ADMINISTERED DRUGS (ALT 637 FOR MEDICARE OP, ALT 636 FOR OP/ED): Performed by: STUDENT IN AN ORGANIZED HEALTH CARE EDUCATION/TRAINING PROGRAM

## 2024-11-09 PROCEDURE — 36415 COLL VENOUS BLD VENIPUNCTURE: CPT | Performed by: STUDENT IN AN ORGANIZED HEALTH CARE EDUCATION/TRAINING PROGRAM

## 2024-11-09 PROCEDURE — 99239 HOSP IP/OBS DSCHRG MGMT >30: CPT | Performed by: INTERNAL MEDICINE

## 2024-11-09 RX ORDER — DAPAGLIFLOZIN 5 MG/1
5 TABLET, FILM COATED ORAL DAILY
Qty: 30 TABLET | Refills: 11 | Status: SHIPPED | OUTPATIENT
Start: 2024-11-10 | End: 2025-11-10

## 2024-11-09 RX ORDER — BUDESONIDE 3 MG/1
9 CAPSULE, COATED PELLETS ORAL DAILY
Qty: 90 CAPSULE | Refills: 0 | Status: SHIPPED | OUTPATIENT
Start: 2024-11-10 | End: 2024-12-10

## 2024-11-09 RX ORDER — ASPIRIN 81 MG/1
81 TABLET ORAL DAILY
Qty: 30 TABLET | Refills: 11 | Status: SHIPPED | OUTPATIENT
Start: 2024-11-10 | End: 2025-11-10

## 2024-11-09 RX ORDER — METOPROLOL SUCCINATE 25 MG/1
12.5 TABLET, EXTENDED RELEASE ORAL DAILY
Qty: 15 TABLET | Refills: 11 | Status: SHIPPED | OUTPATIENT
Start: 2024-11-09 | End: 2025-11-09

## 2024-11-09 RX ORDER — LOPERAMIDE HYDROCHLORIDE 2 MG/1
2 CAPSULE ORAL 4 TIMES DAILY PRN
Qty: 30 CAPSULE | Refills: 0 | Status: SHIPPED | OUTPATIENT
Start: 2024-11-09

## 2024-11-09 RX ORDER — CALCIUM CARBONATE 200(500)MG
1 TABLET,CHEWABLE ORAL DAILY
Start: 2024-11-09 | End: 2025-01-08

## 2024-11-09 RX ORDER — SPIRONOLACTONE 25 MG/1
12.5 TABLET ORAL DAILY
Qty: 15 TABLET | Refills: 11 | Status: SHIPPED | OUTPATIENT
Start: 2024-11-10 | End: 2025-11-10

## 2024-11-09 RX ADMIN — MAGNESIUM OXIDE TAB 400 MG (241.3 MG ELEMENTAL MG) 800 MG: 400 (241.3 MG) TAB at 08:46

## 2024-11-09 RX ADMIN — IRON SUCROSE 200 MG: 20 INJECTION, SOLUTION INTRAVENOUS at 10:09

## 2024-11-09 RX ADMIN — ASPIRIN 81 MG: 81 TABLET, COATED ORAL at 08:47

## 2024-11-09 RX ADMIN — METOPROLOL SUCCINATE 12.5 MG: 25 TABLET, EXTENDED RELEASE ORAL at 08:46

## 2024-11-09 RX ADMIN — ENOXAPARIN SODIUM 40 MG: 100 INJECTION SUBCUTANEOUS at 08:47

## 2024-11-09 RX ADMIN — DAPAGLIFLOZIN 5 MG: 10 TABLET, FILM COATED ORAL at 08:46

## 2024-11-09 RX ADMIN — SPIRONOLACTONE 12.5 MG: 25 TABLET, FILM COATED ORAL at 08:46

## 2024-11-09 RX ADMIN — FINASTERIDE 5 MG: 5 TABLET, FILM COATED ORAL at 08:46

## 2024-11-09 RX ADMIN — CALCIUM CARBONATE (ANTACID) CHEW TAB 500 MG 500 MG: 500 CHEW TAB at 08:47

## 2024-11-09 RX ADMIN — ESCITALOPRAM OXALATE 10 MG: 10 TABLET ORAL at 08:46

## 2024-11-09 RX ADMIN — FAMOTIDINE 40 MG: 20 TABLET ORAL at 08:46

## 2024-11-09 RX ADMIN — BUDESONIDE 9 MG: 3 CAPSULE, COATED PELLETS ORAL at 08:48

## 2024-11-09 RX ADMIN — SACUBITRIL AND VALSARTAN 0.5 TABLET: 24; 26 TABLET, FILM COATED ORAL at 08:46

## 2024-11-09 RX ADMIN — CLOPIDOGREL BISULFATE 75 MG: 75 TABLET ORAL at 08:47

## 2024-11-09 RX ADMIN — Medication 2000 UNITS: at 08:47

## 2024-11-09 RX ADMIN — TAMSULOSIN HYDROCHLORIDE 0.4 MG: 0.4 CAPSULE ORAL at 08:46

## 2024-11-09 RX ADMIN — PANTOPRAZOLE SODIUM 40 MG: 40 TABLET, DELAYED RELEASE ORAL at 05:29

## 2024-11-09 ASSESSMENT — PAIN SCALES - GENERAL: PAINLEVEL_OUTOF10: 0 - NO PAIN

## 2024-11-09 ASSESSMENT — PAIN - FUNCTIONAL ASSESSMENT: PAIN_FUNCTIONAL_ASSESSMENT: 0-10

## 2024-11-09 ASSESSMENT — COGNITIVE AND FUNCTIONAL STATUS - GENERAL
MOBILITY SCORE: 24
DAILY ACTIVITIY SCORE: 24

## 2024-11-09 NOTE — CARE PLAN
The patient's goals for the shift include get some rest    The clinical goals for the shift include Remain HDS    Over the shift, the patient did make progress toward the following goals as evidenced by DC. Patient advised on post DC instructions to continue with plan of care.         Problem: Heart Failure  Goal: Improved gas exchange this shift  Outcome: Met  Goal: Improved urinary output this shift  Outcome: Met  Goal: Reduction in peripheral edema within 24 hours  Outcome: Met  Goal: Report improvement of dyspnea/breathlessness this shift  Outcome: Met  Goal: Weight from fluid excess reduced over 2-3 days, then stabilize  Outcome: Met  Goal: Increase self care and/or family involvement in 24 hours  Outcome: Met     Problem: Pain - Adult  Goal: Verbalizes/displays adequate comfort level or baseline comfort level  Outcome: Met     Problem: Safety - Adult  Goal: Free from fall injury  Outcome: Met     Problem: Discharge Planning  Goal: Discharge to home or other facility with appropriate resources  Outcome: Met     Problem: Chronic Conditions and Co-morbidities  Goal: Patient's chronic conditions and co-morbidity symptoms are monitored and maintained or improved  Outcome: Met

## 2024-11-09 NOTE — PROGRESS NOTES
Heart Failure Attending Note    Principal Problem:    Acute combined systolic and diastolic heart failure  Active Problems:    CAD (coronary artery disease)    Diarrhea    Ventricular tachycardia (Multi)    PAD (peripheral artery disease) (CMS-[facility])    Ascending aortic aneurysm (CMS-[facility])    Myocardial infarct, old    Cardiomyopathy, ischemic    Former smoker    Patient transferred to the floor. Since yesterday he has been tolerating quadruple therapy for HFrEF at low dosages. On exam today he looks stable, no JVD, no LE edema, legs and arms are warm to the touch. He states he feels well.    GDMT plan:  Spironolactone 12.5mg every day   Dapagliflozin 5mg every day   Entresto 24/26mg half-tablet (I.e., 12/23mg) twice daily  Metoprolol succinate 12.5mg once daliy  Loop diuretic stopped; only PRN    He got IV iron in the hospital.    Dose of oral steorid increased due to colitis.    My office will contact him on Monday to arrange an echocardiogram and in-person visit in the coming few weeks. If LVEF persistently <35% on GDMT will need to consider primary prevention ICD.    OK to discharge home today.    Jesus Leblanc MD, MPH  Advanced Heart Failure and Transplant Cardiology  [facility] [place]  [facility]

## 2024-11-09 NOTE — DISCHARGE INSTRUCTIONS
You have karmen started on new heart failure medications.   Dr Leblanc office will call with an appointment follow up.    Your home toresmide you do not need to take daily you can take as needed for weight gain of 3 pound in a two days or 5 pounds in a week that is usually water weight.  Your weight on discharge 135 pounds     You saw our GI team and thy have recommended increasing your Budesonide from 3 mg to 9mg for a month and continue to follow up with your GI physician to discuss tapering doses.   Please have a follow up with your GI team/physician call and make an appointment.

## 2024-11-09 NOTE — SIGNIFICANT EVENT
Pt arrived to LT 5 in stable condition. Orders reviewed and released.     Briefly, Anurag Waite is a 74 y.o. male with a h/o AAA (4 cm), PAD/RLE claudication s/p femoral artery endarterectomy, HF class 3 (not on GDMT), CAD s/p LCX PCI attempt c/b LM-LAD dissection, MI, VT, and hx of cardiogenic shock who presents to HFICU post left and right heart cath for initiation of GDMT. Right heart cath 11/6/2024- CO/CI= 4.5/2.6; RA mean 6; RV 55/3; PA 56/19, PCWP with large V wave- 19/43 (mean 28) swan removed in cath lab. Admitted to HF ICU for initiation of heart failure GDMT and now transferred to floor.      Plan:  -On minimized GDMT d/t hypotension; metop succ 12.5 daily, melonie 12.5 daily, dapa 5 daily, entresto 12/13 BID  -Budesonide for collagenous colitis per GI

## 2024-11-09 NOTE — CARE PLAN
The patient's goals for the shift include get some rest    The clinical goals for the shift include patient will remain HDS this shift      Problem: Heart Failure  Goal: Improved gas exchange this shift  Outcome: Progressing  Goal: Improved urinary output this shift  Outcome: Progressing  Goal: Reduction in peripheral edema within 24 hours  Outcome: Progressing  Goal: Report improvement of dyspnea/breathlessness this shift  Outcome: Progressing  Goal: Weight from fluid excess reduced over 2-3 days, then stabilize  Outcome: Progressing  Goal: Increase self care and/or family involvement in 24 hours  Outcome: Progressing     Problem: Pain - Adult  Goal: Verbalizes/displays adequate comfort level or baseline comfort level  Outcome: Progressing     Problem: Safety - Adult  Goal: Free from fall injury  Outcome: Progressing     Problem: Discharge Planning  Goal: Discharge to home or other facility with appropriate resources  Outcome: Progressing     Problem: Chronic Conditions and Co-morbidities  Goal: Patient's chronic conditions and co-morbidity symptoms are monitored and maintained or improved  Outcome: Progressing

## 2024-11-09 NOTE — NURSING NOTE
RN reviewed AVS in detail with patient and family at bedside. Patient and family verbalized understanding of all medications reviewed, which ones to cut in half for correct dosage, when to take PRN diuretic and which ones still needing to be taken tonight. Patient aware follow up appointments needed with cardiologist listed & to call GI doctor to monitor titration of steroid medication. Patient PIV removed & patient dressed. Meds 2 bed delivered, RN verified medication bottles & listed medications. Patient and family aware that per pharmacy price through insurance for medications expected to be $205.55 . Transport requested for wheelchair to take patient to lobby for DC with family in private vehicle.

## 2024-11-09 NOTE — DISCHARGE SUMMARY
Discharge Diagnosis  Acute combined systolic and diastolic heart failure    Issues Requiring Follow-Up  Follow up with Dr Leblanc heart failure     Hospital Course   Briefly, Anurag Waite is a 74 y.o. male with a h/o AAA (4 cm), PAD/RLE claudication s/p femoral artery endarterectomy, HF class 3 (not on GDMT), CAD s/p LCX PCI attempt c/b LM-LAD dissection, MI, VT, and hx of cardiogenic shock who presents to HFICU post left and right heart cath for initiation of GDMT. Right heart cath 11/6/2024- CO/CI= 4.5/2.6; RA mean 6; RV 55/3; PA 56/19, PCWP with large V wave- 19/43 (mean 28) swan removed in cath lab. Admitted to HF ICU for initiation of heart failure GDMT and now transferred to floor.        Plan:  -On minimized GDMT d/t hypotension; metop succ 12.5 daily, melonie 12.5 daily, dapa 5 daily, entresto 12/13 BID  -Budesonide for collagenous colitis per GI    Seen with Dr Leblanc   Tolerating GDMMT meds.  Discharge as planned and take diureitc as needed.        Pertinent Physical Exam At Time of Discharge  Physical Exam  Constitutional:       Appearance: Normal appearance.   HENT:      Mouth/Throat:      Mouth: Mucous membranes are moist.   Cardiovascular:      Rate and Rhythm: Regular rhythm. Bradycardia present.   Pulmonary:      Effort: Pulmonary effort is normal.      Breath sounds: Normal breath sounds.   Abdominal:      Palpations: Abdomen is soft.   Musculoskeletal:         General: Normal range of motion.   Skin:     General: Skin is warm and dry.   Neurological:      General: No focal deficit present.      Mental Status: He is alert and oriented to person, place, and time.         Home Medications     Medication List      START taking these medications     aspirin 81 mg EC tablet; Take 1 tablet (81 mg) by mouth once daily.;   Start taking on: November 10, 2024   dapagliflozin propanediol 5 mg; Commonly known as: Farxiga; Take 1   tablet (5 mg) by mouth once daily.; Start taking on: November 10, 2024    loperamide 2 mg capsule; Commonly known as: Imodium; Take 1 capsule (2   mg) by mouth 4 times a day as needed for diarrhea.   sacubitriL-valsartan 24-26 mg tablet; Commonly known as: Entresto; Take   0.5 tablets by mouth 2 times a day.   spironolactone 25 mg tablet; Commonly known as: Aldactone; Take 0.5   tablets (12.5 mg) by mouth once daily.; Start taking on: November 10, 2024     CHANGE how you take these medications     budesonide EC 3 mg 24 hr capsule; Commonly known as: Entocort EC; Take 3   capsules (9 mg) by mouth once daily.; Start taking on: November 10, 2024;   What changed: how much to take   calcium carbonate 200 mg calcium chewable tablet; Commonly known as:   Tums; Chew 1 tablet (500 mg) once daily.; What changed: how to take this   metoprolol succinate XL 25 mg 24 hr tablet; Commonly known as:   Toprol-XL; Take 0.5 tablets (12.5 mg) by mouth once daily. Do not crush or   chew.; What changed: how much to take   torsemide 40 mg tablet; Take 40 mg by mouth once daily as needed (for   weight gain of 3 pounds in a day or 5 pounds in a week).; What changed:   when to take this, reasons to take this     CONTINUE taking these medications     ALPRAZolam 0.5 mg tablet; Commonly known as: Xanax   atorvastatin 80 mg tablet; Commonly known as: Lipitor   cholecalciferol 50 mcg (2,000 unit) capsule; Commonly known as: Vitamin   D-3   clopidogrel 75 mg tablet; Commonly known as: Plavix   escitalopram 20 mg tablet; Commonly known as: Lexapro   famotidine 40 mg tablet; Commonly known as: Pepcid   finasteride 5 mg tablet; Commonly known as: Proscar   melatonin 5 mg tablet   multivitamin tablet   nitroglycerin 0.4 mg SL tablet; Commonly known as: Nitrostat   omeprazole 40 mg DR capsule; Commonly known as: PriLOSEC   Oyster Shell Calcium-Vit D3 500 mg-5 mcg (200 unit) tablet; Generic   drug: calcium carbonate-vitamin D3   tamsulosin 0.4 mg 24 hr capsule; Commonly known as: Flomax     STOP taking these medications      Klor-Con M10 10 mEq ER tablet; Generic drug: potassium chloride CR       Outpatient Follow-Up  No future appointments.    MILDRED Simpson-CNP  Seen and discussed with dr Leblanc    [Negative] : Heme/Lymph

## 2024-11-11 ENCOUNTER — PATIENT OUTREACH (OUTPATIENT)
Dept: HOME HEALTH SERVICES | Age: 74
End: 2024-11-11
Payer: MEDICARE

## 2024-11-11 DIAGNOSIS — I25.5 CARDIOMYOPATHY, ISCHEMIC: ICD-10-CM

## 2024-11-11 DIAGNOSIS — I50.41 ACUTE COMBINED SYSTOLIC AND DIASTOLIC HEART FAILURE: Primary | Chronic | ICD-10-CM

## 2024-11-11 SDOH — SOCIAL STABILITY: SOCIAL INSECURITY: ARE YOU MARRIED, WIDOWED, DIVORCED, SEPARATED, NEVER MARRIED, OR LIVING WITH A PARTNER?: MARRIED

## 2024-11-11 SDOH — SOCIAL STABILITY: SOCIAL NETWORK: HOW OFTEN DO YOU GET TOGETHER WITH FRIENDS OR RELATIVES?: ONCE A WEEK

## 2024-11-11 SDOH — SOCIAL STABILITY: SOCIAL NETWORK
DO YOU BELONG TO ANY CLUBS OR ORGANIZATIONS SUCH AS CHURCH GROUPS, UNIONS, FRATERNAL OR ATHLETIC GROUPS, OR SCHOOL GROUPS?: NO

## 2024-11-11 SDOH — SOCIAL STABILITY: SOCIAL NETWORK: IN A TYPICAL WEEK, HOW MANY TIMES DO YOU TALK ON THE PHONE WITH FAMILY, FRIENDS, OR NEIGHBORS?: ONCE A WEEK

## 2024-11-11 SDOH — SOCIAL STABILITY: SOCIAL NETWORK: HOW OFTEN DO YOU ATTEND CHURCH OR RELIGIOUS SERVICES?: NEVER

## 2024-11-11 SDOH — SOCIAL STABILITY: SOCIAL NETWORK: HOW OFTEN DO YOU ATTEND MEETINGS OF THE CLUBS OR ORGANIZATIONS YOU BELONG TO?: NEVER

## 2024-11-12 ENCOUNTER — APPOINTMENT (OUTPATIENT)
Dept: CARDIOLOGY | Facility: CLINIC | Age: 74
End: 2024-11-12
Payer: MEDICARE

## 2024-11-13 LAB
ATRIAL RATE: 54 BPM
P AXIS: 61 DEGREES
P OFFSET: 201 MS
P ONSET: 129 MS
PR INTERVAL: 182 MS
Q ONSET: 220 MS
QRS COUNT: 9 BEATS
QRS DURATION: 88 MS
QT INTERVAL: 484 MS
QTC CALCULATION(BAZETT): 458 MS
QTC FREDERICIA: 467 MS
R AXIS: -68 DEGREES
T AXIS: 98 DEGREES
T OFFSET: 462 MS
VENTRICULAR RATE: 54 BPM

## 2024-11-14 ENCOUNTER — PATIENT OUTREACH (OUTPATIENT)
Dept: HOME HEALTH SERVICES | Age: 74
End: 2024-11-14

## 2024-11-14 ENCOUNTER — TELEMEDICINE CLINICAL SUPPORT (OUTPATIENT)
Dept: CARE COORDINATION | Age: 74
End: 2024-11-14
Payer: MEDICARE

## 2024-11-14 VITALS
WEIGHT: 133 LBS | BODY MASS INDEX: 20.83 KG/M2 | SYSTOLIC BLOOD PRESSURE: 97 MMHG | HEART RATE: 62 BPM | DIASTOLIC BLOOD PRESSURE: 48 MMHG

## 2024-11-14 DIAGNOSIS — K52.831 COLLAGENOUS COLITIS: ICD-10-CM

## 2024-11-14 DIAGNOSIS — I50.41 ACUTE COMBINED SYSTOLIC AND DIASTOLIC HEART FAILURE: Primary | Chronic | ICD-10-CM

## 2024-11-14 NOTE — PROGRESS NOTES
"Initial [facility] Call Note: Monalisa LEVY-CNP    Pt diagnosis, acute combined systolic and diastolic heart failure. Pt's wife stated they go to [facility] for care since they live in [place], [place]. Pt stated he is feeling better and has started taking 2 Torsemide tabs (takes PRN for over 3 lbs in a day or 5 lbs in 1 week.) Pt has some swelling in his legs but they are much better. Denies chest pain, SOB, dizziness or lightheadedness right now. Pt stated he is not having any \"diarrhea.\" but bowels are loose, for a few weeks, takes OTC medicine that helps. Pt has many cardiac rehab appointments set up. Tomorrow is the initial one, wife stated she will call in the morning to verify. Next [facility] set up 11/21 @1730.      Topics for Daily Review: Med's with provider, initial [facility] call.  Pt demonstrates clear understanding: Yes    Daily VS:  BP (!) 97/48   Pulse 62   Wt 60.3 kg (133 lb)   BMI 20.83 kg/m²       Last 3 Weights:  Wt Readings from Last 7 Encounters:   11/09/24 61.4 kg (135 lb 5.8 oz)   11/04/24 62.4 kg (137 lb 9.6 oz)       Btargeto Device: No       Virtual Visits--Scheduled (Most Recent Date at Top)  Follow up Appointments  Recent Visits  No visits were found meeting these conditions.  Showing recent visits within past 30 days and meeting all other requirements  Future Appointments  No visits were found meeting these conditions.  Showing future appointments within next 90 days and meeting all other requirements       Frequency of RN Calls & Virtual Visits per Team Agreement: Healthy at Home Frequency: Daily    Medication issues Addressed (what was done): Medication reconciliation done.    Follow up appointments scheduled by [facility] Staff: [facility] 11/21 @1730          "

## 2024-11-15 ENCOUNTER — PATIENT OUTREACH (OUTPATIENT)
Dept: CARE COORDINATION | Age: 74
End: 2024-11-15
Payer: MEDICARE

## 2024-11-15 VITALS
BODY MASS INDEX: 20.83 KG/M2 | SYSTOLIC BLOOD PRESSURE: 96 MMHG | WEIGHT: 133 LBS | DIASTOLIC BLOOD PRESSURE: 48 MMHG | HEART RATE: 60 BPM

## 2024-11-15 NOTE — PROGRESS NOTES
Daily Call Note:   Daily call with patient and his wife completed.  Patient denies any shortness of breath, or cough but continues to have some lower extremity edema.  Pt is taking Tesimide PRN for the swelling and reviewed the weight gain and use of Torsemide with patient, he verbalized understanding. Patient states he is walkmg daily and some  of it is uphill and he is not shot of breath with this activity.  Todays weight 133# BP 96/48 HR 60.  Next [facility] call confirmed 11/21/24 @ 1730.  No further questions or concerns.     Pt Education: weight gain and PRN Torsemide  Barriers: none  Topics for Daily Review: daily bran   Pt demonstrates clear understanding: Yes    Daily Weight:  There were no vitals filed for this visit.   Last 3 Weights:  Wt Readings from Last 7 Encounters:   11/14/24 60.3 kg (133 lb)   11/09/24 61.4 kg (135 lb 5.8 oz)   11/04/24 62.4 kg (137 lb 9.6 oz)       Masimo Device: No   Masimo Clinical Impression: n/a    Virtual Visits--Scheduled (Most Recent Date at Top)  Follow up Appointments  Recent Visits  No visits were found meeting these conditions.  Showing recent visits within past 30 days and meeting all other requirements  Future Appointments  No visits were found meeting these conditions.  Showing future appointments within next 90 days and meeting all other requirements       Frequency of RN Calls & Virtual Visits per Team Agreement: Healthy at Home Frequency: Daily    Medication issues Addressed (what was done): Reviewed Torsimde usage     Follow up appointments scheduled by [facility] Staff: n/a  Referrals made by [facility] staff: n/a

## 2024-11-16 ENCOUNTER — PATIENT OUTREACH (OUTPATIENT)
Dept: CARE COORDINATION | Age: 74
End: 2024-11-16
Payer: MEDICARE

## 2024-11-16 VITALS
SYSTOLIC BLOOD PRESSURE: 95 MMHG | WEIGHT: 134 LBS | DIASTOLIC BLOOD PRESSURE: 47 MMHG | HEART RATE: 64 BPM | BODY MASS INDEX: 20.98 KG/M2

## 2024-11-16 NOTE — PROGRESS NOTES
"Daily Call Note:   1700 - Daily call completed with pt, He states that he is doing \"very well.\"   BP's are running low - this am 83/42 and at the time of the call 95/47 - he is occasionally symptomatic, lightheaded / dizzy. Though denies it at this time.  Weight today 134.  Confirmed [facility] is scheduled for 11/21 at 1730.  Encouraged pt to contact [facility] at anytime 24/7.  No other questions or concerns at this time.      Pt Education: POC  Barriers: none  Topics for Daily Review: daily call  Pt demonstrates clear understanding: Yes    Daily Weight:  There were no vitals filed for this visit.   Last 3 Weights:  Wt Readings from Last 7 Encounters:   11/15/24 60.3 kg (133 lb)   11/14/24 60.3 kg (133 lb)   11/09/24 61.4 kg (135 lb 5.8 oz)   11/04/24 62.4 kg (137 lb 9.6 oz)       Masimo Device: No   Masimo Clinical Impression: n/a    Virtual Visits--Scheduled (Most Recent Date at Top)  Follow up Appointments  Recent Visits  No visits were found meeting these conditions.  Showing recent visits within past 30 days and meeting all other requirements  Future Appointments  No visits were found meeting these conditions.  Showing future appointments within next 90 days and meeting all other requirements       Frequency of RN Calls & Virtual Visits per Team Agreement: Healthy at Home Frequency: Daily    Medication issues Addressed (what was done): none    Follow up appointments scheduled by [facility] Staff: none  Referrals made by [facility] staff: none        "

## 2024-11-19 ENCOUNTER — PATIENT OUTREACH (OUTPATIENT)
Dept: HOME HEALTH SERVICES | Age: 74
End: 2024-11-19
Payer: MEDICARE

## 2024-11-19 NOTE — PROGRESS NOTES
Daily Call Note:     Daily call completed with the patient's wife.  She states he is doing pretty good and seems to be getting better each day.  His morning vitals 103/59, HR 66, and wt. 136 lbs.  She states he does have some LE edema,  so he took an extra dose of Torsemide as advised by his provider.  His late afternoon BP 95/59, HR 60.  He was asymptomatic and runs low at times.  He does elevate his legs during the day and sleeps on a bed that allows him to recline at night.  No questions, concerns, or assistance needed on the call.  Reminded of next [facility] call 11/21 @ 1012.      Pt Education:   Barriers:   Topics for Daily Review:   Pt demonstrates clear understanding:     Daily Weight:  There were no vitals filed for this visit.   Last 3 Weights:  Wt Readings from Last 7 Encounters:   11/16/24 60.8 kg (134 lb)   11/15/24 60.3 kg (133 lb)   11/14/24 60.3 kg (133 lb)   11/09/24 61.4 kg (135 lb 5.8 oz)   11/04/24 62.4 kg (137 lb 9.6 oz)       Masimo Device:    Masimo Clinical Impression:    Virtual Visits--Scheduled (Most Recent Date at Top)  Follow up Appointments  Recent Visits  No visits were found meeting these conditions.  Showing recent visits within past 30 days and meeting all other requirements  Future Appointments  No visits were found meeting these conditions.  Showing future appointments within next 90 days and meeting all other requirements       Frequency of RN Calls & Virtual Visits per Team Agreement:     Medication issues Addressed (what was done):     Follow up appointments scheduled by [facility] Staff:   Referrals made by [facility] staff:

## 2024-11-20 ENCOUNTER — TELEPHONE (OUTPATIENT)
Dept: CARDIOLOGY | Facility: CLINIC | Age: 74
End: 2024-11-20
Payer: MEDICARE

## 2024-11-20 ENCOUNTER — PATIENT OUTREACH (OUTPATIENT)
Dept: CARE COORDINATION | Age: 74
End: 2024-11-20
Payer: MEDICARE

## 2024-11-20 VITALS
BODY MASS INDEX: 21.14 KG/M2 | WEIGHT: 135 LBS | DIASTOLIC BLOOD PRESSURE: 59 MMHG | HEART RATE: 66 BPM | SYSTOLIC BLOOD PRESSURE: 108 MMHG

## 2024-11-20 NOTE — PROGRESS NOTES
"Daily Call Note:     1745 - Daily call completed with pt's spouse, Genna. She states that pt is doing \"really well\" today - he even went to help his brother install some windows today. She said, \"I think he's almost back to normal.\"  /59   Pulse 66   Wt 61.2 kg (135 lb)   BMI 21.14 kg/m² (this afternoon).  She states that he denies CP, SOB and edema.  Reminded her about the [facility] scheduled for 11/21 at 1730.  No other questions or concerns at this time.      Pt Education: POC  Barriers: none  Topics for Daily Review: VS; feeling good  Pt demonstrates clear understanding: Yes    Daily Weight:  There were no vitals filed for this visit.   Last 3 Weights:  Wt Readings from Last 7 Encounters:   11/16/24 60.8 kg (134 lb)   11/15/24 60.3 kg (133 lb)   11/14/24 60.3 kg (133 lb)   11/19/24 61.7 kg (136 lb)   11/09/24 61.4 kg (135 lb 5.8 oz)   11/04/24 62.4 kg (137 lb 9.6 oz)       Masimo Device: No   Masimo Clinical Impression: n/a    Virtual Visits--Scheduled (Most Recent Date at Top)  Follow up Appointments  Recent Visits  No visits were found meeting these conditions.  Showing recent visits within past 30 days and meeting all other requirements  Future Appointments  No visits were found meeting these conditions.  Showing future appointments within next 90 days and meeting all other requirements       Frequency of RN Calls & Virtual Visits per Team Agreement: Healthy at Home Frequency: Daily    Medication issues Addressed (what was done): none    Follow up appointments scheduled by [facility] Staff: none  Referrals made by [facility] staff: none        "

## 2024-11-20 NOTE — TELEPHONE ENCOUNTER
I called the patient and spoke to his wife Amarilis.  He is doing well generally, but is requiring torsemide approximately 3 times per week due to leg swelling.  She shared with me a diary with his blood pressure values mostly ranging with systolic blood pressures in the 90s mmHg.    I advised them to have him increase the dose of Farxiga from 5 mg a day to 10 mg a day, in the hopes that by improving this GDMT for HFrEF he will find himself needing to use the loop diuretic less frequently.    I am due to see him in person in 2 weeks in clinic.    Jesus Leblanc MD, MPH  Advanced Heart Failure and Transplant Cardiology  [facility]  [facility] [place]

## 2024-11-21 ENCOUNTER — APPOINTMENT (OUTPATIENT)
Dept: CARE COORDINATION | Age: 74
End: 2024-11-21
Payer: MEDICARE

## 2024-11-21 ENCOUNTER — PATIENT OUTREACH (OUTPATIENT)
Dept: HOME HEALTH SERVICES | Age: 74
End: 2024-11-21

## 2024-11-21 VITALS
DIASTOLIC BLOOD PRESSURE: 52 MMHG | BODY MASS INDEX: 21.61 KG/M2 | SYSTOLIC BLOOD PRESSURE: 100 MMHG | HEART RATE: 64 BPM | WEIGHT: 138 LBS

## 2024-11-21 DIAGNOSIS — I25.85 CHRONIC CORONARY MICROVASCULAR DYSFUNCTION: Primary | ICD-10-CM

## 2024-11-21 NOTE — PROGRESS NOTES
Daily Call Note:   [facility] call completed with Dr. Adler, patient and his wife. Patient states he is doing good and feels great. Patient denies CP, SOB. States still has some minor swelling. Patient saw Dr. Leblanc 11/20 and was advised to increase his Farxiga from 5 mg to 10 mg. Patient states no other questions or concerns. Next [facility] scheduled for 11/29 @ 1730.     VS @ 0530 - /57, HR 63, weight 134 lb        @ 1730 - /52, HR 64, weight 138 lb (after dinner)    Pt Education:   Barriers: None  Topics for Daily Review:   Pt demonstrates clear understanding: Yes    Daily Weight:  There were no vitals filed for this visit.   Last 3 Weights:  Wt Readings from Last 7 Encounters:   11/20/24 61.2 kg (135 lb)   11/16/24 60.8 kg (134 lb)   11/15/24 60.3 kg (133 lb)   11/14/24 60.3 kg (133 lb)   11/19/24 61.7 kg (136 lb)   11/09/24 61.4 kg (135 lb 5.8 oz)   11/04/24 62.4 kg (137 lb 9.6 oz)       Masimo Device: No   Masimo Clinical Impression: N/A    Virtual Visits--Scheduled (Most Recent Date at Top)  Follow up Appointments  Recent Visits  No visits were found meeting these conditions.  Showing recent visits within past 30 days and meeting all other requirements  Future Appointments  No visits were found meeting these conditions.  Showing future appointments within next 90 days and meeting all other requirements  , weight      Frequency of RN Calls & Virtual Visits per Team Agreement: Healthy at Home Frequency: Daily    Medication issues Addressed (what was done): None    Follow up appointments scheduled by [facility] Staff: None  Referrals made by [facility] staff: None

## 2024-11-22 ENCOUNTER — PATIENT OUTREACH (OUTPATIENT)
Dept: HOME HEALTH SERVICES | Age: 74
End: 2024-11-22
Payer: MEDICARE

## 2024-11-22 VITALS
DIASTOLIC BLOOD PRESSURE: 62 MMHG | SYSTOLIC BLOOD PRESSURE: 100 MMHG | BODY MASS INDEX: 21.14 KG/M2 | WEIGHT: 135 LBS | HEART RATE: 62 BPM

## 2024-11-22 NOTE — PROGRESS NOTES
Daily Call Note:   Pt's family member stated he is doing well, it it the best week he has had since the summer. No other concerns today she stated, [facility]    Pt demonstrates clear understanding: Yes    Daily VS:  /62   Pulse 62   Wt 61.2 kg (135 lb)   BMI 21.14 kg/m²       Last 3 Weights:  Wt Readings from Last 7 Encounters:   11/21/24 62.6 kg (138 lb)   11/20/24 61.2 kg (135 lb)   11/16/24 60.8 kg (134 lb)   11/15/24 60.3 kg (133 lb)   11/14/24 60.3 kg (133 lb)   11/19/24 61.7 kg (136 lb)   11/09/24 61.4 kg (135 lb 5.8 oz)       Virtual Visits--Scheduled (Most Recent Date at Top)  Follow up Appointments  Recent Visits  No visits were found meeting these conditions.  Showing recent visits within past 30 days and meeting all other requirements  Future Appointments  No visits were found meeting these conditions.  Showing future appointments within next 90 days and meeting all other requirements

## 2024-11-23 ENCOUNTER — PATIENT OUTREACH (OUTPATIENT)
Dept: HOME HEALTH SERVICES | Age: 74
End: 2024-11-23
Payer: MEDICARE

## 2024-11-23 VITALS
HEART RATE: 71 BPM | DIASTOLIC BLOOD PRESSURE: 56 MMHG | SYSTOLIC BLOOD PRESSURE: 104 MMHG | BODY MASS INDEX: 21.45 KG/M2 | WEIGHT: 137 LBS

## 2024-11-25 ENCOUNTER — HOSPITAL ENCOUNTER (OUTPATIENT)
Dept: CARDIOLOGY | Facility: HOSPITAL | Age: 74
Discharge: HOME | End: 2024-11-25
Payer: MEDICARE

## 2024-11-25 ENCOUNTER — PATIENT OUTREACH (OUTPATIENT)
Dept: HOME HEALTH SERVICES | Age: 74
End: 2024-11-25

## 2024-11-25 VITALS
HEART RATE: 68 BPM | BODY MASS INDEX: 22.08 KG/M2 | SYSTOLIC BLOOD PRESSURE: 107 MMHG | WEIGHT: 141 LBS | DIASTOLIC BLOOD PRESSURE: 58 MMHG

## 2024-11-25 DIAGNOSIS — I25.5 CARDIOMYOPATHY, ISCHEMIC: ICD-10-CM

## 2024-11-25 DIAGNOSIS — I42.9 CARDIOMYOPATHY, UNSPECIFIED: ICD-10-CM

## 2024-11-25 DIAGNOSIS — I50.41 ACUTE COMBINED SYSTOLIC AND DIASTOLIC HEART FAILURE: Chronic | ICD-10-CM

## 2024-11-25 DIAGNOSIS — I50.41 ACUTE COMBINED SYSTOLIC AND DIASTOLIC HEART FAILURE: Primary | Chronic | ICD-10-CM

## 2024-11-25 DIAGNOSIS — I25.85 CHRONIC CORONARY MICROVASCULAR DYSFUNCTION: ICD-10-CM

## 2024-11-25 PROCEDURE — 93306 TTE W/DOPPLER COMPLETE: CPT

## 2024-11-25 PROCEDURE — 93306 TTE W/DOPPLER COMPLETE: CPT | Performed by: INTERNAL MEDICINE

## 2024-11-26 ENCOUNTER — PATIENT OUTREACH (OUTPATIENT)
Dept: HOME HEALTH SERVICES | Age: 74
End: 2024-11-26
Payer: MEDICARE

## 2024-11-26 VITALS — SYSTOLIC BLOOD PRESSURE: 84 MMHG | DIASTOLIC BLOOD PRESSURE: 48 MMHG | HEART RATE: 50 BPM

## 2024-11-26 DIAGNOSIS — I25.5 CARDIOMYOPATHY, ISCHEMIC: ICD-10-CM

## 2024-11-26 DIAGNOSIS — I25.10 CAD (CORONARY ARTERY DISEASE): ICD-10-CM

## 2024-11-26 LAB
AORTIC VALVE MEAN GRADIENT: 3 MMHG
AORTIC VALVE PEAK VELOCITY: 1.19 M/S
AV PEAK GRADIENT: 6 MMHG
AVA (PEAK VEL): 2.63 CM2
AVA (VTI): 2.48 CM2
EJECTION FRACTION APICAL 4 CHAMBER: 52.7
EJECTION FRACTION: 25 %
LEFT ATRIUM VOLUME AREA LENGTH INDEX BSA: 65.5 ML/M2
LEFT VENTRICLE INTERNAL DIMENSION DIASTOLE: 6 CM (ref 3.5–6)
LEFT VENTRICULAR OUTFLOW TRACT DIAMETER: 2.1 CM
MITRAL VALVE E/A RATIO: 3.24
MITRAL VALVE E/E' RATIO: 2.9
RIGHT VENTRICLE FREE WALL PEAK S': 11.3 CM/S
RIGHT VENTRICLE PEAK SYSTOLIC PRESSURE: 68.3 MMHG
TRICUSPID ANNULAR PLANE SYSTOLIC EXCURSION: 2.3 CM

## 2024-11-26 RX ORDER — METOPROLOL SUCCINATE 25 MG/1
25 TABLET, EXTENDED RELEASE ORAL DAILY
Qty: 30 TABLET | Refills: 11 | Status: SHIPPED | OUTPATIENT
Start: 2024-11-26 | End: 2024-12-06 | Stop reason: SDUPTHER

## 2024-11-26 RX ORDER — DAPAGLIFLOZIN 10 MG/1
10 TABLET, FILM COATED ORAL DAILY
Qty: 30 TABLET | Refills: 11 | Status: SHIPPED | OUTPATIENT
Start: 2024-11-26 | End: 2024-12-06 | Stop reason: SDUPTHER

## 2024-11-26 NOTE — PROGRESS NOTES
Daily Call Note:   11/25 Daily call completed. Patient stated the he feels better. Diarrhea has improved. Reported a weight gain of 3 lbs. Yesterday's weight 138 lbs. Today's AM weight 141. Asymptomatic. Denies any shortness of breath or chest pain. Stated that he will take his PRN dose of Torsemide 40 mg daily dose first thing in the morning. He stated that he do not want to be up all not using the restroom. Dr. Katz made aware. No new orders at this time. Patient had an echo performed today. Next follow-up with Dr. Leblanc 12/5.   Pt Education: Torsemide order  Barriers: None  Topics for Daily Review: Weight, Medication changes, Vitals, and Daily weight  Pt demonstrates clear understanding: Yes    /58  HR 68    Daily Weight:  There were no vitals filed for this visit.   Last 3 Weights:  Wt Readings from Last 7 Encounters:   11/23/24 62.1 kg (137 lb)   11/22/24 61.2 kg (135 lb)   11/21/24 62.6 kg (138 lb)   11/20/24 61.2 kg (135 lb)   11/16/24 60.8 kg (134 lb)   11/15/24 60.3 kg (133 lb)   11/14/24 60.3 kg (133 lb)       Masimo Device: No   Masimo Clinical Impression: N/A    Virtual Visits--Scheduled (Most Recent Date at Top)  Follow up Appointments  Recent Visits  No visits were found meeting these conditions.  Showing recent visits within past 30 days and meeting all other requirements  Future Appointments  No visits were found meeting these conditions.  Showing future appointments within next 90 days and meeting all other requirements       Frequency of RN Calls & Virtual Visits per Team Agreement: Healthy at Home Frequency: Daily    Medication issues Addressed (what was done): Yes, message sent to Dr. Leblanc in regards to medication increases (Farxiga and Metoprolol).    Follow up appointments scheduled by [facility] Staff: None  Referrals made by [facility] staff: None

## 2024-11-26 NOTE — PROGRESS NOTES
Daily Call Note:   11/36 Daily call completed. Patient stated that he feels well. Reported /52 HR 52. Vitals at 3:30 pm  BP 85/48 HR 50. Patient is asymptomatic. Denies any chest pain or shortness. Reported AM weight of 124 and PM (3:30 pm) weight of 141. Patient took PRN dose of Torsemide this morning. Next [facility] 11/29 @1730.   Pt Education: Monitoring for edema  Barriers: None  Topics for Daily Review: Weight and prescription  Pt demonstrates clear understanding: Yes    Daily Weight:  There were no vitals filed for this visit.   Last 3 Weights:  Wt Readings from Last 7 Encounters:   11/25/24 64 kg (141 lb)   11/23/24 62.1 kg (137 lb)   11/22/24 61.2 kg (135 lb)   11/21/24 62.6 kg (138 lb)   11/20/24 61.2 kg (135 lb)   11/16/24 60.8 kg (134 lb)   11/15/24 60.3 kg (133 lb)       Masimo Device: No   Masimo Clinical Impression: N/A    Virtual Visits--Scheduled (Most Recent Date at Top)  Follow up Appointments  Recent Visits  No visits were found meeting these conditions.  Showing recent visits within past 30 days and meeting all other requirements  Future Appointments  No visits were found meeting these conditions.  Showing future appointments within next 90 days and meeting all other requirements       Frequency of RN Calls & Virtual Visits per Team Agreement: Healthy at Home Frequency: Daily    Medication issues Addressed (what was done): None    Follow up appointments scheduled by [facility] Staff: None  Referrals made by [facility] staff: None               51

## 2024-11-27 ENCOUNTER — DOCUMENTATION (OUTPATIENT)
Dept: HOME HEALTH SERVICES | Age: 74
End: 2024-11-27
Payer: MEDICARE

## 2024-11-27 NOTE — PROGRESS NOTES
0630: patient's wife called in with vitals. Patient feeling well this morning. Wt 137 lb, 95/51 HR 60. Asking if he should take both metoprolol and torsemide. Per on call provider Dr. Morales, take metoprolol, don't take torsemide, and if the weight is still increased, call cardiologist for further advice. Called patient's wife back, verbalized understanding instructions

## 2024-11-28 NOTE — PROGRESS NOTES
11/27/24 2020 Pt called back to report. Pt reported his BP at 5am was 95/51 HR 60, weight 137 pounds.  At 5pm the patients vitals 100/50 HR 63 and weight 139 pounds.  Pt endorsing some bilat ankle swelling R>L. Pt states there is no warmth or erythema to either ankle. Pt states he did spend a lot of time on his feet today. He has been trying to sit and elevate them when he does. Pt reports he did not take his torsemide today, as previously instructed. Pt endorses he does have complression socks/greg hose. Encouraged patient to wear these beginning tomorrow am when up and remove at hs to see if this helps his edema. Pt also endorsing concern for an old/unchanged hematoma that was related to a surgical site from July. To his groin. Pt concerend this may be the cause of his swelling. Reports there is not increase in size, no swelling, pain or erythema to this site and other s/s of infection. Pt has upcoming appt with Dr Pate  and will have him review the site at that time

## 2024-11-29 ENCOUNTER — TELEPHONE (OUTPATIENT)
Dept: CARDIOLOGY | Facility: HOSPITAL | Age: 74
End: 2024-11-29

## 2024-11-29 ENCOUNTER — APPOINTMENT (OUTPATIENT)
Dept: CARE COORDINATION | Age: 74
End: 2024-11-29
Payer: MEDICARE

## 2024-11-29 ENCOUNTER — PATIENT OUTREACH (OUTPATIENT)
Dept: HOME HEALTH SERVICES | Age: 74
End: 2024-11-29

## 2024-11-29 ENCOUNTER — TELEMEDICINE (OUTPATIENT)
Dept: PHARMACY | Facility: HOSPITAL | Age: 74
End: 2024-11-29
Payer: MEDICARE

## 2024-11-29 VITALS
WEIGHT: 139 LBS | BODY MASS INDEX: 21.77 KG/M2 | HEART RATE: 65 BPM | SYSTOLIC BLOOD PRESSURE: 109 MMHG | DIASTOLIC BLOOD PRESSURE: 64 MMHG

## 2024-11-29 DIAGNOSIS — I25.85 CHRONIC CORONARY MICROVASCULAR DYSFUNCTION: Primary | ICD-10-CM

## 2024-11-29 DIAGNOSIS — I50.41 ACUTE COMBINED SYSTOLIC AND DIASTOLIC HEART FAILURE: ICD-10-CM

## 2024-11-29 DIAGNOSIS — I25.85 CHRONIC CORONARY MICROVASCULAR DYSFUNCTION: ICD-10-CM

## 2024-11-29 DIAGNOSIS — I25.5 CARDIOMYOPATHY, ISCHEMIC: ICD-10-CM

## 2024-11-29 DIAGNOSIS — I50.41 ACUTE COMBINED SYSTOLIC AND DIASTOLIC HEART FAILURE: Chronic | ICD-10-CM

## 2024-11-29 NOTE — PROGRESS NOTES
[facility]    Anurag Waite is a 74 y.o. male was referred to Clinical Pharmacy Team to complete a post-discharge medication optimization and monitoring visit.  The patient was referred for CHF management while in [facility]. Today was our third visit.       Referring Provider: Kristen Katz*  PCP: No primary care provider on file. - not with       Subjective   No Known Allergies    [facility] - [place], [place] - [address]  [address]  [place] [address]  Phone: [phone] Fax: [phone]    [facility]  [address], [address]  [facility] [address]  Phone: [phone] Fax: [phone]      Medication System Management:  Affordability/Accessibility: try to enroll into PAP  Adherence/Organization: no issues       Social History     Social History Narrative    Not on file          HPI  CHF ASSESSMENT  Staging:  Most recent ejection fraction: 25%  NYHA Stage: III  ACC/AHA Stage: C    Symptom Assessment:  Weight changes/edema?: Yes - up 6 lbs from admission date   Dyspnea?: None  Dizziness/syncope/palpitations?: No    Current Regimen:  ARNI/ACEi/ARB: Yes - entresto   Beta Blocker: Yes - metoprolol   MRA: Yes - spironolactone   SGLT2i: Yes - farxiga     Other therapy:  Plavix   Flomax   Torsemide     Secondary Prevention:  The ASCVD Risk score (Perfecto SANTACRUZ, et al., 2019) failed to calculate for the following reasons:    Risk score cannot be calculated because patient has a medical history suggesting prior/existing ASCVD    Aspirin 81mg? yes  Statin?: Yes - atorvastatin  HTN?: No     Review of Systems        Objective     There were no vitals taken for this visit.   BP Readings from Last 4 Encounters:   11/26/24 (!) 84/48   11/25/24 107/58   11/23/24 104/56   11/22/24 100/62      There were no vitals filed for this visit.     LAB  Lab Results   Component Value Date    BILITOT 0.6 11/06/2024    CALCIUM 8.7 11/09/2024    CO2 26 11/09/2024     11/09/2024     "CREATININE 0.78 11/09/2024    GLUCOSE 84 11/09/2024    ALKPHOS 77 11/06/2024    K 4.0 11/09/2024    PROT 7.5 11/06/2024     11/09/2024    AST 29 11/06/2024    ALT 33 11/06/2024    BUN 8 11/09/2024    ANIONGAP 12 11/09/2024    MG 2.30 11/09/2024    PHOS 3.2 11/09/2024    ALBUMIN 3.0 (L) 11/09/2024     No results found for: \"TRIG\", \"CHOL\", \"LDLCALC\", \"HDL\"  Lab Results   Component Value Date    HGBA1C 5.9 (H) 11/06/2024         Current Outpatient Medications   Medication Instructions    ALPRAZolam (XANAX) 0.5 mg, 3 times daily PRN    aspirin 81 mg, oral, Daily    atorvastatin (LIPITOR) 80 mg, Daily    budesonide EC (ENTOCORT EC) 9 mg, oral, Daily    calcium carbonate (TUMS) 500 mg, oral, Daily    calcium carbonate-vitamin D3 (Oyster Shell Calcium-Vit D3) 500 mg-5 mcg (200 unit) tablet 1 tablet, 2 times daily    cholecalciferol (VITAMIN D-3) 2,000 Units, Daily    clopidogrel (PLAVIX) 75 mg, Daily    dapagliflozin propanediol (FARXIGA) 10 mg, oral, Daily    escitalopram (LEXAPRO) 10 mg, Daily    famotidine (PEPCID) 40 mg, Daily    finasteride (PROSCAR) 5 mg, Daily    loperamide (IMODIUM) 2 mg, oral, 4 times daily PRN    melatonin 5 mg, Nightly    metoprolol succinate XL (TOPROL-XL) 25 mg, oral, Daily, Do not crush or chew.    multivitamin tablet 1 tablet, Daily    nitroglycerin (Nitrostat) 0.4 mg SL tablet 1 tablet, Every 5 min PRN    omeprazole (PRILOSEC) 40 mg, Daily    sacubitriL-valsartan (Entresto) 24-26 mg tablet 0.5 tablets, oral, 2 times daily    spironolactone (ALDACTONE) 12.5 mg, oral, Daily    tamsulosin (FLOMAX) 0.4 mg, Daily    torsemide 40 mg, oral, Daily PRN          Assessment/Plan   Problem List Items Addressed This Visit       CAD (coronary artery disease)    Acute combined systolic and diastolic heart failure (Chronic)       -most recent EF was 25% from 11/25  -checking his weights and BP's daily   -says still has swelling in his legs but has not gotten worse or better   -wears compression " stockings again and says that wearing these do help with the swelling too     Weight 139 lbs (up 5 lbs from visit last week)  /64  HR 65    Medications Changes/Concerns:  CHF  Current GDMT --> metoprolol, entresto, farxiga and spironolactone   Farxiga increased to 10mg daily   Entresto also increased to full tab of 24-26mg twice daily so took last tablet today  Torsemide as needed       Refills Needed:  -Entresto      Plan/To Do:  -send refill to his [facility] pharmacy --> told them to also get the free trial from the Entresto website to use for next 30 days since did not use from discharge   -continue with daily weights and BP's  -decrease nursing calls to [facility]  -seeing Dr. Pate at HF clinic next week        PAP Status:  -confirmed that him and his wife do still file taxes   -discussed needing copy of 1040 form to submit for possible enrollment  -his wife is going to help with getting me copy  -once received I will submit       Time Spent with Patient and [facility] Team - Dr. Katz and Hyacinth PRICE RN via phone call: 30 minutes      Follow Up: 1 week     Continue all meds under the continuation of care with the referring provider and clinical pharmacy team.    Wayne Ramos, PharmD     Verbal consent to manage patient's drug therapy was obtained from the patient. They were informed they may decline to participate or withdraw from participation in pharmacy services at any time.

## 2024-11-29 NOTE — PROGRESS NOTES
Daily Call Note: Weekly [facility] [place] complete with Dr. Cesar, Wayne Gaming, PharmD, patient and patient's spouse, Genna. Patient is doing ok. He reports swelling in legs is about the same, he is wearing compression stockings during day and taking off at night. He denies CP and SOB. He is checking weight and BP daily. Wayne discussed PAP for Entresto and Farxiga, Genna will text the tax information to Wayne. Wayne notified him of free trial card for entresto to use at his pharmacy until he is approved for the PAP program, verbalized understanding. Discussed follow up with PCP and Genna will call and schedule appointment. Cardiology appointment 12/6/24 and next [facility] [place] scheduled 12/6/24 at 1800, verbalized understanding. No other questions at this time.     Pt Education: per POC  Barriers: none  Topics for Daily Review: BP, weight  Pt demonstrates clear understanding: Yes    Daily Weight:  There were no vitals filed for this visit.   Last 3 Weights:  Wt Readings from Last 7 Encounters:   11/25/24 64 kg (141 lb)   11/23/24 62.1 kg (137 lb)   11/22/24 61.2 kg (135 lb)   11/21/24 62.6 kg (138 lb)   11/20/24 61.2 kg (135 lb)   11/16/24 60.8 kg (134 lb)   11/15/24 60.3 kg (133 lb)       Masimo Device: No   Masimo Clinical Impression: n/a    Virtual Visits--Scheduled (Most Recent Date at Top)  Follow up Appointments  Recent Visits  No visits were found meeting these conditions.  Showing recent visits within past 30 days and meeting all other requirements  Future Appointments  No visits were found meeting these conditions.  Showing future appointments within next 90 days and meeting all other requirements       Frequency of RN Calls & Virtual Visits per Team Agreement: [facility] Home Frequency: Daily    Medication issues Addressed (what was done): see note    Follow up appointments scheduled by [facility] Staff: none  Referrals made by [facility] staff: none

## 2024-12-01 ENCOUNTER — DOCUMENTATION (OUTPATIENT)
Dept: HOME HEALTH SERVICES | Age: 74
End: 2024-12-01
Payer: MEDICARE

## 2024-12-02 ENCOUNTER — PATIENT OUTREACH (OUTPATIENT)
Dept: CARE COORDINATION | Age: 74
End: 2024-12-02
Payer: MEDICARE

## 2024-12-02 VITALS
DIASTOLIC BLOOD PRESSURE: 55 MMHG | HEART RATE: 62 BPM | BODY MASS INDEX: 22.39 KG/M2 | SYSTOLIC BLOOD PRESSURE: 94 MMHG | WEIGHT: 143 LBS

## 2024-12-02 PROCEDURE — RXMED WILLOW AMBULATORY MEDICATION CHARGE

## 2024-12-02 NOTE — PROGRESS NOTES
Pt called into [facility]. Pt states he has had increase in swelling and leg discomfort. Pt wt was 143 lbs (yest 144 lbs). BP 94/55, HR 62. He has torsemide (40 mg) to take PRN... There was some confusion whether it was PRN or daily and he says a cardiologist he believes told him to take PRN... Pt is wondering if he should restart torsemide today. Will check with [facility] provider and return all to pt.          Patient's Address:   [address]  [facility] [address]  **  If this is not the address patient will receive services - alert team and address in EMR**       Patient Contacts:  Extended Emergency Contact Information  Primary Emergency Contact: Genna Waite  Address: [address] [address] [facility]  Home Phone: [phone]  Mobile Phone: [phone]  Relation: Spouse                                Patient's Preferred Phone: [phone]  Patient's E-mail: [email or web address]

## 2024-12-03 ENCOUNTER — APPOINTMENT (OUTPATIENT)
Dept: CARE COORDINATION | Age: 74
End: 2024-12-03
Payer: MEDICARE

## 2024-12-03 ENCOUNTER — PATIENT OUTREACH (OUTPATIENT)
Dept: CARE COORDINATION | Age: 74
End: 2024-12-03

## 2024-12-04 ENCOUNTER — PATIENT OUTREACH (OUTPATIENT)
Dept: HOME HEALTH SERVICES | Age: 74
End: 2024-12-04
Payer: MEDICARE

## 2024-12-04 ENCOUNTER — PHARMACY VISIT (OUTPATIENT)
Dept: PHARMACY | Facility: CLINIC | Age: 74
End: 2024-12-04
Payer: COMMERCIAL

## 2024-12-04 VITALS — DIASTOLIC BLOOD PRESSURE: 55 MMHG | SYSTOLIC BLOOD PRESSURE: 101 MMHG | HEART RATE: 59 BPM

## 2024-12-04 NOTE — PROGRESS NOTES
2105- Attempted [facility]. No answer, LVM. May need to reschedule if no return call.

## 2024-12-05 ENCOUNTER — APPOINTMENT (OUTPATIENT)
Dept: CARDIOLOGY | Facility: CLINIC | Age: 74
End: 2024-12-05
Payer: MEDICARE

## 2024-12-06 ENCOUNTER — TELEMEDICINE CLINICAL SUPPORT (OUTPATIENT)
Dept: CARE COORDINATION | Age: 74
End: 2024-12-06
Payer: MEDICARE

## 2024-12-06 ENCOUNTER — PATIENT OUTREACH (OUTPATIENT)
Dept: CARE COORDINATION | Age: 74
End: 2024-12-06

## 2024-12-06 ENCOUNTER — APPOINTMENT (OUTPATIENT)
Dept: CARDIOLOGY | Facility: CLINIC | Age: 74
End: 2024-12-06
Payer: MEDICARE

## 2024-12-06 ENCOUNTER — APPOINTMENT (OUTPATIENT)
Dept: CARE COORDINATION | Age: 74
End: 2024-12-06
Payer: MEDICARE

## 2024-12-06 VITALS
DIASTOLIC BLOOD PRESSURE: 60 MMHG | HEART RATE: 56 BPM | SYSTOLIC BLOOD PRESSURE: 94 MMHG | BODY MASS INDEX: 22.07 KG/M2 | HEIGHT: 67 IN | TEMPERATURE: 97 F | WEIGHT: 140.6 LBS

## 2024-12-06 VITALS
DIASTOLIC BLOOD PRESSURE: 56 MMHG | HEART RATE: 57 BPM | SYSTOLIC BLOOD PRESSURE: 92 MMHG | WEIGHT: 133 LBS | BODY MASS INDEX: 20.83 KG/M2

## 2024-12-06 DIAGNOSIS — I25.5 CARDIOMYOPATHY, ISCHEMIC: ICD-10-CM

## 2024-12-06 DIAGNOSIS — I50.22 CHRONIC SYSTOLIC HEART FAILURE: Primary | ICD-10-CM

## 2024-12-06 DIAGNOSIS — I25.10 CAD (CORONARY ARTERY DISEASE): ICD-10-CM

## 2024-12-06 DIAGNOSIS — I25.85 CHRONIC CORONARY MICROVASCULAR DYSFUNCTION: Primary | ICD-10-CM

## 2024-12-06 DIAGNOSIS — I50.41 ACUTE COMBINED SYSTOLIC AND DIASTOLIC HEART FAILURE: ICD-10-CM

## 2024-12-06 DIAGNOSIS — K52.831 COLLAGENOUS COLITIS: ICD-10-CM

## 2024-12-06 PROCEDURE — 1159F MED LIST DOCD IN RCRD: CPT | Performed by: INTERNAL MEDICINE

## 2024-12-06 PROCEDURE — 99215 OFFICE O/P EST HI 40 MIN: CPT | Performed by: INTERNAL MEDICINE

## 2024-12-06 PROCEDURE — G2211 COMPLEX E/M VISIT ADD ON: HCPCS | Performed by: INTERNAL MEDICINE

## 2024-12-06 PROCEDURE — 1111F DSCHRG MED/CURRENT MED MERGE: CPT | Performed by: INTERNAL MEDICINE

## 2024-12-06 PROCEDURE — 3008F BODY MASS INDEX DOCD: CPT | Performed by: INTERNAL MEDICINE

## 2024-12-06 RX ORDER — SPIRONOLACTONE 25 MG/1
25 TABLET ORAL DAILY
Qty: 30 TABLET | Refills: 11 | Status: SHIPPED | OUTPATIENT
Start: 2024-12-06 | End: 2024-12-06 | Stop reason: SDUPTHER

## 2024-12-06 RX ORDER — DAPAGLIFLOZIN 10 MG/1
10 TABLET, FILM COATED ORAL DAILY
Qty: 90 TABLET | Refills: 3 | Status: SHIPPED | OUTPATIENT
Start: 2024-12-06 | End: 2025-12-06

## 2024-12-06 RX ORDER — SPIRONOLACTONE 25 MG/1
37.5 TABLET ORAL DAILY
Qty: 135 TABLET | Refills: 3 | Status: SHIPPED | OUTPATIENT
Start: 2024-12-06 | End: 2025-12-06

## 2024-12-06 RX ORDER — FUROSEMIDE 20 MG/1
40 TABLET ORAL SEE ADMIN INSTRUCTIONS
Qty: 100 TABLET | Refills: 6 | Status: SHIPPED | OUTPATIENT
Start: 2024-12-06 | End: 2025-12-06

## 2024-12-06 RX ORDER — ATORVASTATIN CALCIUM 80 MG/1
80 TABLET, FILM COATED ORAL DAILY
Qty: 90 TABLET | Refills: 3 | Status: SHIPPED | OUTPATIENT
Start: 2024-12-06 | End: 2025-12-06

## 2024-12-06 RX ORDER — ASPIRIN 81 MG/1
81 TABLET ORAL DAILY
Qty: 90 TABLET | Refills: 3 | Status: SHIPPED | OUTPATIENT
Start: 2024-12-06 | End: 2025-12-06

## 2024-12-06 RX ORDER — METOPROLOL SUCCINATE 25 MG/1
25 TABLET, EXTENDED RELEASE ORAL DAILY
Qty: 90 TABLET | Refills: 3 | Status: SHIPPED | OUTPATIENT
Start: 2024-12-06 | End: 2025-12-06

## 2024-12-06 RX ORDER — CLOPIDOGREL BISULFATE 75 MG/1
75 TABLET ORAL DAILY
Qty: 90 TABLET | Refills: 3 | Status: SHIPPED | OUTPATIENT
Start: 2024-12-06 | End: 2025-12-06

## 2024-12-06 NOTE — PATIENT INSTRUCTIONS
Thank you for coming to see us today! To reach Dr. Leblanc's office please call [phone]. Fax [phone]. Call [phone] to schedule an appointment. You may also contact the HF RNs at [email or web address]  (Please include your name and date of birth)  For MEDICATION REFILLS, please call [phone] option [phone] then option 1.    INCREASE Spironolactone to 37.5mg once a day.   Take Metoprolol in the evening; this will help prevent your blood pressure from dropping too low.   STOP Torsemide. START Furosemide 40mg as needed for SOB/swelling/weight gain.   Please obtain an Echocardiogram at the beginning of January. Call [phone] to schedule. Dr. Leblanc will reach out to you with the results of the echocardiogram.

## 2024-12-06 NOTE — PROGRESS NOTES
Recent Hospitalizations: 11/6/24-11/9/24 CHF exacerbation  Accompanied by: Brother Kamlesh Jones Hx: Quit smoking 5 months ago. Drinks 2-3 beers daily. Denies illicit drug use.   Diet: None.  Exercise: Has a referral for cardiac rehab but has not started yet.     Denies chest pain, chest pressure, palpitations, orthopnea, PND.   Denies headaches, dizziness, and falls.    When he retains fluid, he becomes very SOB. He currently denies having SOB; he took 40mg of Torsemide for two days this week.   He experiences lightheadedness if he stands too quickly.   He reports generalized weakness and fatigue.   He has had lower extremity edema in the past; does not have any in clinic today.

## 2024-12-06 NOTE — PROGRESS NOTES
Daily Call Note: Weekly [facility] complete with CAROLINA Hodgson, patient and patient's spouse, Genna. Patient states he is doing good. He had appointment with Dr. Pate today, he states everything went very well, aldactone was increased to 37.5 mg daily. He states swelling in legs and ankles is gone. He denies CP, SOB, HA, fever/chills, numbness and tingling. He is going to start cardiac rehab next week. He will continue in Healthy at Home program one more week, discussed possible graduation next week. Next [facility] scheduled 12/13/24 at 1800, verbalized understanding. Genna states they can call and schedule PCP appointment next week before graduation. No other questions at this time.       Pt Education: per POC  Barriers  Topics for Daily Review: BP, weight  Pt demonstrates clear understanding: Yes    Daily Weight:  There were no vitals filed for this visit.   Last 3 Weights:  Wt Readings from Last 7 Encounters:   12/06/24 63.8 kg (140 lb 9.6 oz)   12/02/24 64.9 kg (143 lb)   11/29/24 63 kg (139 lb)   11/25/24 64 kg (141 lb)   11/23/24 62.1 kg (137 lb)   11/22/24 61.2 kg (135 lb)   11/21/24 62.6 kg (138 lb)       Masimo Device: No   Masimo Clinical Impression: n/a    Virtual Visits--Scheduled (Most Recent Date at Top)  Follow up Appointments  Recent Visits  No visits were found meeting these conditions.  Showing recent visits within past 30 days and meeting all other requirements  Future Appointments  No visits were found meeting these conditions.  Showing future appointments within next 90 days and meeting all other requirements       Frequency of RN Calls & Virtual Visits per Team Agreement: [facility] Home Frequency: M/W/F    Medication issues Addressed (what was done): none    Follow up appointments scheduled by [facility] Staff: none  Referrals made by [facility] staff: none

## 2024-12-06 NOTE — PROGRESS NOTES
Heart Failure Cardiology    Anurag Waite is a 74 y.o. male from [place], [place], referred by Dr. Abel Gutierrez for consultation regarding management of heart failure. He was hospitalized at [facility] between 11/6-11/9/2024.    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 In this setting he suffered cardiogenic shock, VT, and respiratory failure requiring intubation.     He was hospitalized at [facility] between 11/6-11/9/2024.    He was ok for several weeks following hospital discharge. In the last 2 weeks he started getting edema in legs again and dyspnea on exertion, and took torsemide PRN. With that he improved once again.    Studies:    Recent evaluations:  Left heart cath - patent Left sided stents; No significant disease  Right heart cath - Co/CI= 4.5/2.6; RA mean 6; RV 55/3; PA 56/19, PCWP with large V wave- 19/43(mean 28)  Echocardiogram - LVEF 29%, LVIDD 5.69cm, moderate MR, moderate TR    Anemia (Hgb 9.5) + iron deficiency (ferritin 50, iron 28, % sat 7%)    Echocardiogram 11/25/2024  1. Left ventricular ejection fraction is severely decreased, by visual estimate at 25%.   2. There is global hypokinesis of the left ventricle with minor regional variations.   3. Spectral Doppler shows a Grade III (restrictive pattern) of left ventricular diastolic filling with an elevated left atrial pressure.   4. Left ventricular cavity size is mildly dilated.   5. There is moderate eccentric left ventricular hypertrophy.   6. There is normal right ventricular global systolic function.   7. The left atrium is severely dilated.   8. Moderate mitral valve regurgitation.   9. Moderately elevated right ventricular systolic pressure.  10. Mild to moderate aortic valve regurgitation.    Exam: BP 94/60 (BP Location: Right arm, Patient Position: Sitting, BP Cuff Size: Adult)   Pulse 56    "Temp 36.1 °C (97 °F) (Temporal)   Ht 1.702 m (5' 7\")   Wt 63.8 kg (140 lb 9.6 oz)   BMI 22.02 kg/m²   No JVD  RRR  No LE edema  Well appearing    Current Outpatient Medications   Medication Instructions    ALPRAZolam (XANAX) 0.5 mg, 3 times daily PRN    aspirin 81 mg, oral, Daily    atorvastatin (LIPITOR) 80 mg, Daily    budesonide EC (ENTOCORT EC) 9 mg, oral, Daily    calcium carbonate-vitamin D3 (Oyster Shell Calcium-Vit D3) 500 mg-5 mcg (200 unit) tablet 1 tablet, 2 times daily    cholecalciferol (VITAMIN D-3) 2,000 Units, Daily    clopidogrel (PLAVIX) 75 mg, Daily    dapagliflozin propanediol (FARXIGA) 10 mg, oral, Daily    escitalopram (LEXAPRO) 10 mg, Daily    finasteride (PROSCAR) 5 mg, Daily    loperamide (IMODIUM) 2 mg, oral, 4 times daily PRN    medical cannabis 1 each, oral, Nightly    melatonin 5 mg, Nightly    metoprolol succinate XL (TOPROL-XL) 25 mg, oral, Daily, Do not crush or chew.    multivitamin tablet 1 tablet, Daily    nitroglycerin (Nitrostat) 0.4 mg SL tablet 1 tablet, Every 5 min PRN    sacubitriL-valsartan (Entresto) 24-26 mg tablet 1 tablet, oral, 2 times daily    spironolactone (ALDACTONE) 25 mg, oral, Daily    tamsulosin (FLOMAX) 0.4 mg, Daily    torsemide 40 mg, oral, Daily PRN     Past medical history (non-cardiac):  -- Iron deficiency anemia  -- Former smoker (7/2024)  -- Osteoporosis    Cardiovascular history:  -- PAD  -- CAD: iatrogenic LM-LAD dissection (8/2024) during PCI of LCX, which was subsequently required stenting of LM, LM-LAD, and LAD. In this setting he suffered cardiogenic shock  -- Ischemic cardiomyopathy  -- HFrEF, Stage C, NYHA class I-II  -- History of ventricular tachycardia  -- Ascending aortic aneurysm (4.0 cm by CT)    Assessment: 74 y.o. WM with ischemic cardiomyopathy and HFrEF Stage C. Recent episode of fluid retention at home that required use of loop diuretic.    Plan:  -- He has been on Flomax for >a decade, it was started after he had hernias. It is " unclear whether he actually needs it or not. I explained to him that it is associated with reduced BP. He will try withholding it to see if he actually needs it.  -- Increase dose of spironolactone to 37.5mg every day  -- Start cardiac rehab  -- If he will continue using loop diuretic PRN if he retains fluid. It may be better for him to use furosemide over torsemide because it is less potent and less likely to overshoot and cause volume depletion  -- Plan to get an echo in January 2025 to reassess LVEF and make further decisions about need for ICD    Jesus Leblanc MD, MPH  Advanced Heart Failure and Transplant Cardiology ([facility]FTC)  [facility]  [place], [place]

## 2024-12-09 ENCOUNTER — PATIENT OUTREACH (OUTPATIENT)
Dept: CARE COORDINATION | Age: 74
End: 2024-12-09
Payer: MEDICARE

## 2024-12-09 VITALS
BODY MASS INDEX: 21.3 KG/M2 | DIASTOLIC BLOOD PRESSURE: 55 MMHG | HEART RATE: 58 BPM | SYSTOLIC BLOOD PRESSURE: 106 MMHG | WEIGHT: 136 LBS

## 2024-12-09 NOTE — PROGRESS NOTES
Daily Call Note:     1705 - Daily call completed with pt, spouse this afternoon.   Pt is doing well.  Started cardiac rehab today - spouse reports pt able to do all of the exercises initiated today.  Still no PCP appt - spouse reports this was on her agenda today but she got distracted at work. She is aware of need and will let us know of appt on 12/11.  /55   Pulse 58   Wt 61.7 kg (136 lb)   BMI 21.30 kg/m²   Next [facility] is 12/13 at 1800.  No other questions or concerns at this time.    Pt Education: POC  Barriers: none  Topics for Daily Review: daily call  Pt demonstrates clear understanding: Yes    Daily Weight:  Vitals:    12/09/24 0500   Weight: 61.7 kg (136 lb)      Last 3 Weights:  Wt Readings from Last 7 Encounters:   12/09/24 61.7 kg (136 lb)   12/06/24 60.3 kg (133 lb)   12/06/24 63.8 kg (140 lb 9.6 oz)   12/02/24 64.9 kg (143 lb)   11/29/24 63 kg (139 lb)   11/25/24 64 kg (141 lb)   11/23/24 62.1 kg (137 lb)       Masimo Device: No   Masimo Clinical Impression: n/a    Virtual Visits--Scheduled (Most Recent Date at Top)  Follow up Appointments  Recent Visits  No visits were found meeting these conditions.  Showing recent visits within past 30 days and meeting all other requirements  Future Appointments  No visits were found meeting these conditions.  Showing future appointments within next 90 days and meeting all other requirements       Frequency of RN Calls & Virtual Visits per Team Agreement: [facility] Home Frequency: MWF    Medication issues Addressed (what was done): none    Follow up appointments scheduled by [facility] Staff: none  Referrals made by [facility] staff: none

## 2024-12-13 ENCOUNTER — PATIENT OUTREACH (OUTPATIENT)
Dept: CARE COORDINATION | Age: 74
End: 2024-12-13

## 2024-12-13 ENCOUNTER — APPOINTMENT (OUTPATIENT)
Dept: CARE COORDINATION | Age: 74
End: 2024-12-13
Payer: MEDICARE

## 2024-12-13 VITALS — DIASTOLIC BLOOD PRESSURE: 55 MMHG | BODY MASS INDEX: 20.99 KG/M2 | SYSTOLIC BLOOD PRESSURE: 105 MMHG | WEIGHT: 134 LBS

## 2024-12-13 DIAGNOSIS — K52.831 COLLAGENOUS COLITIS: Primary | ICD-10-CM

## 2024-12-13 DIAGNOSIS — I25.85 CHRONIC CORONARY MICROVASCULAR DYSFUNCTION: ICD-10-CM

## 2024-12-13 NOTE — PROGRESS NOTES
[facility] Call Note: DR Katz    Pt stated cardiac rehab started. Pt stated he is doing well, /55. Taking torsemide as instructed. He states swelling in legs and ankles is gone. He denies CP, SOB, dizziness or lightheadedness today. Appointment with PCP today went well he stated. No other concerns.    Topics for Daily Review: VS, appointments.  Pt demonstrates clear understanding: Yes    Daily VS:      Last 3 Weights:  Wt Readings from Last 7 Encounters:   12/09/24 61.7 kg (136 lb)   12/06/24 60.3 kg (133 lb)   12/06/24 63.8 kg (140 lb 9.6 oz)   12/02/24 64.9 kg (143 lb)   11/29/24 63 kg (139 lb)   11/25/24 64 kg (141 lb)   11/23/24 62.1 kg (137 lb)       Masimo Device: No       Virtual Visits--Scheduled (Most Recent Date at Top)  Follow up Appointments  Recent Visits  No visits were found meeting these conditions.  Showing recent visits within past 30 days and meeting all other requirements  Future Appointments  No visits were found meeting these conditions.  Showing future appointments within next 90 days and meeting all other requirements       Frequency of RN Calls & Virtual Visits per Team Agreement: [facility] Home Frequency: Bi-Weekly    Follow up appointments scheduled by [facility] Staff: 12/20 @1800

## 2024-12-13 NOTE — PROGRESS NOTES
"Spoke with pt for daily call. He states he has been feeling well and his vitals \"have been within the range.\" [facility] rescheduled for  12/18 @ 1600. Pt had no concerns at this time.   "

## 2024-12-16 ENCOUNTER — PATIENT OUTREACH (OUTPATIENT)
Dept: CARE COORDINATION | Age: 74
End: 2024-12-16
Payer: MEDICARE

## 2024-12-16 NOTE — PROGRESS NOTES
Daily call completed patient is doing well bp today 97/54 57 135 lbs patient has been going to Cardiac rehab and is getting a little stronger each day. Patient dis see new PCP Friday Renay Maria [address], [place], [place] [address] [phone]  He has refills available at the pharmacy. Questions and concerns addressed

## 2024-12-18 ENCOUNTER — PATIENT OUTREACH (OUTPATIENT)
Dept: CARE COORDINATION | Age: 74
End: 2024-12-18
Payer: MEDICARE

## 2024-12-18 VITALS
DIASTOLIC BLOOD PRESSURE: 57 MMHG | SYSTOLIC BLOOD PRESSURE: 105 MMHG | BODY MASS INDEX: 21.46 KG/M2 | HEART RATE: 66 BPM | WEIGHT: 137 LBS

## 2024-12-18 NOTE — PROGRESS NOTES
Daily Call Note:     1700 - Daily call completed with pt's spouse.   Pt was not at home - out at the store.  She states that the pt is doing really well; had cardiac rehab today and they've increased the time that he spends on each machine. He is tolerating that well.  /57   Pulse 66   Wt 62.1 kg (137 lb)   BMI 21.46 kg/m²   No questions or concerns at this time.  Confirmed that next [facility] is 12/20 at 1800. Spouse verbalized understanding.     Pt Education: POC  Barriers: pt not home  Topics for Daily Review: dialy call  Pt demonstrates clear understanding: Yes    Daily Weight:  There were no vitals filed for this visit.   Last 3 Weights:  Wt Readings from Last 7 Encounters:   12/13/24 60.8 kg (134 lb)   12/09/24 61.7 kg (136 lb)   12/06/24 60.3 kg (133 lb)   12/06/24 63.8 kg (140 lb 9.6 oz)   12/02/24 64.9 kg (143 lb)   11/29/24 63 kg (139 lb)   11/25/24 64 kg (141 lb)       Masimo Device: No   Masimo Clinical Impression: n/a    Virtual Visits--Scheduled (Most Recent Date at Top)  Follow up Appointments  Recent Visits  No visits were found meeting these conditions.  Showing recent visits within past 30 days and meeting all other requirements  Future Appointments  No visits were found meeting these conditions.  Showing future appointments within next 90 days and meeting all other requirements       Frequency of RN Calls & Virtual Visits per Team Agreement: [facility] at Home Frequency: MWF    Medication issues Addressed (what was done): none    Follow up appointments scheduled by [facility] Staff: none  Referrals made by [facility] staff: none

## 2024-12-20 ENCOUNTER — APPOINTMENT (OUTPATIENT)
Dept: CARE COORDINATION | Age: 74
End: 2024-12-20
Payer: MEDICARE

## 2024-12-26 ENCOUNTER — HOSPITAL ENCOUNTER (OUTPATIENT)
Dept: CARDIOLOGY | Facility: CLINIC | Age: 74
Discharge: HOME | End: 2024-12-26
Payer: MEDICARE

## 2024-12-26 DIAGNOSIS — I50.22 CHRONIC SYSTOLIC HEART FAILURE: ICD-10-CM

## 2024-12-26 DIAGNOSIS — I25.10 CAD (CORONARY ARTERY DISEASE): ICD-10-CM

## 2024-12-26 DIAGNOSIS — I25.5 CARDIOMYOPATHY, ISCHEMIC: ICD-10-CM

## 2024-12-26 LAB
AORTIC VALVE PEAK VELOCITY: 1.21 M/S
AV PEAK GRADIENT: 6 MMHG
AVA (PEAK VEL): 3.64 CM2
EJECTION FRACTION APICAL 4 CHAMBER: 38.3
EJECTION FRACTION: 30 %
LEFT ATRIUM VOLUME AREA LENGTH INDEX BSA: 54.9 ML/M2
LEFT VENTRICLE INTERNAL DIMENSION DIASTOLE: 5.93 CM (ref 3.5–6)
LEFT VENTRICULAR OUTFLOW TRACT DIAMETER: 2.32 CM
RIGHT VENTRICLE FREE WALL PEAK S': 14.13 CM/S
RIGHT VENTRICLE PEAK SYSTOLIC PRESSURE: 59.5 MMHG
TRICUSPID ANNULAR PLANE SYSTOLIC EXCURSION: 1.9 CM

## 2024-12-26 PROCEDURE — 93306 TTE W/DOPPLER COMPLETE: CPT | Performed by: STUDENT IN AN ORGANIZED HEALTH CARE EDUCATION/TRAINING PROGRAM

## 2024-12-26 PROCEDURE — 2500000004 HC RX 250 GENERAL PHARMACY W/ HCPCS (ALT 636 FOR OP/ED): Performed by: INTERNAL MEDICINE

## 2024-12-26 PROCEDURE — C8929 TTE W OR WO FOL WCON,DOPPLER: HCPCS

## 2025-01-13 DIAGNOSIS — I50.41 ACUTE COMBINED SYSTOLIC AND DIASTOLIC HEART FAILURE: Primary | Chronic | ICD-10-CM

## 2025-01-13 DIAGNOSIS — I25.5 CARDIOMYOPATHY, ISCHEMIC: ICD-10-CM

## 2025-01-13 DIAGNOSIS — I34.0 NONRHEUMATIC MITRAL VALVE REGURGITATION: ICD-10-CM

## 2025-01-27 PROCEDURE — 93005 ELECTROCARDIOGRAM TRACING: CPT

## 2025-01-29 LAB
ATRIAL RATE: 59 BPM
P AXIS: 27 DEGREES
P OFFSET: 196 MS
P ONSET: 139 MS
PR INTERVAL: 158 MS
Q ONSET: 218 MS
QRS COUNT: 10 BEATS
QRS DURATION: 92 MS
QT INTERVAL: 500 MS
QTC CALCULATION(BAZETT): 495 MS
QTC FREDERICIA: 497 MS
R AXIS: 89 DEGREES
T AXIS: 112 DEGREES
T OFFSET: 468 MS
VENTRICULAR RATE: 59 BPM

## 2025-01-30 ENCOUNTER — HOSPITAL ENCOUNTER (OUTPATIENT)
Dept: CARDIOLOGY | Facility: HOSPITAL | Age: 75
Discharge: HOME | End: 2025-01-30
Payer: MEDICARE

## 2025-01-30 VITALS
RESPIRATION RATE: 13 BRPM | HEART RATE: 60 BPM | DIASTOLIC BLOOD PRESSURE: 56 MMHG | SYSTOLIC BLOOD PRESSURE: 97 MMHG | OXYGEN SATURATION: 95 %

## 2025-01-30 DIAGNOSIS — I25.5 CARDIOMYOPATHY, ISCHEMIC: ICD-10-CM

## 2025-01-30 DIAGNOSIS — I50.41 ACUTE COMBINED SYSTOLIC AND DIASTOLIC HEART FAILURE: Chronic | ICD-10-CM

## 2025-01-30 DIAGNOSIS — I34.0 NONRHEUMATIC MITRAL VALVE REGURGITATION: ICD-10-CM

## 2025-01-30 LAB — EJECTION FRACTION: 33 %

## 2025-01-30 PROCEDURE — 2500000005 HC RX 250 GENERAL PHARMACY W/O HCPCS: Performed by: STUDENT IN AN ORGANIZED HEALTH CARE EDUCATION/TRAINING PROGRAM

## 2025-01-30 PROCEDURE — 93312 ECHO TRANSESOPHAGEAL: CPT | Performed by: STUDENT IN AN ORGANIZED HEALTH CARE EDUCATION/TRAINING PROGRAM

## 2025-01-30 PROCEDURE — 93320 DOPPLER ECHO COMPLETE: CPT | Performed by: STUDENT IN AN ORGANIZED HEALTH CARE EDUCATION/TRAINING PROGRAM

## 2025-01-30 PROCEDURE — 93320 DOPPLER ECHO COMPLETE: CPT

## 2025-01-30 PROCEDURE — 93325 DOPPLER ECHO COLOR FLOW MAPG: CPT | Performed by: STUDENT IN AN ORGANIZED HEALTH CARE EDUCATION/TRAINING PROGRAM

## 2025-01-30 PROCEDURE — 2500000004 HC RX 250 GENERAL PHARMACY W/ HCPCS (ALT 636 FOR OP/ED)

## 2025-01-30 RX ORDER — MIDAZOLAM HYDROCHLORIDE 1 MG/ML
INJECTION INTRAMUSCULAR; INTRAVENOUS
Status: COMPLETED
Start: 2025-01-30 | End: 2025-01-30

## 2025-01-30 RX ORDER — LIDOCAINE HYDROCHLORIDE 20 MG/ML
SOLUTION OROPHARYNGEAL AS NEEDED
Status: DISCONTINUED | OUTPATIENT
Start: 2025-01-30 | End: 2025-01-30 | Stop reason: HOSPADM

## 2025-01-30 RX ORDER — FENTANYL CITRATE 50 UG/ML
INJECTION, SOLUTION INTRAMUSCULAR; INTRAVENOUS
Status: COMPLETED
Start: 2025-01-30 | End: 2025-01-30

## 2025-01-30 RX ADMIN — MIDAZOLAM HYDROCHLORIDE 1 MG: 1 INJECTION, SOLUTION INTRAMUSCULAR; INTRAVENOUS at 13:53

## 2025-01-30 RX ADMIN — LIDOCAINE HYDROCHLORIDE 15 ML: 20 SOLUTION ORAL at 13:36

## 2025-01-30 RX ADMIN — FENTANYL CITRATE 25 MCG: 50 INJECTION INTRAMUSCULAR; INTRAVENOUS at 13:49

## 2025-01-30 RX ADMIN — MIDAZOLAM HYDROCHLORIDE 1 MG: 1 INJECTION, SOLUTION INTRAMUSCULAR; INTRAVENOUS at 13:50

## 2025-01-30 RX ADMIN — MIDAZOLAM HYDROCHLORIDE 1 MG: 1 INJECTION, SOLUTION INTRAMUSCULAR; INTRAVENOUS at 13:45

## 2025-01-30 RX ADMIN — MIDAZOLAM HYDROCHLORIDE 1 MG: 1 INJECTION, SOLUTION INTRAMUSCULAR; INTRAVENOUS at 14:04

## 2025-01-30 RX ADMIN — BENZOCAINE 4 SPRAY: 200 SPRAY DENTAL; ORAL; PERIODONTAL at 13:34

## 2025-01-30 RX ADMIN — FENTANYL CITRATE 25 MCG: 50 INJECTION INTRAMUSCULAR; INTRAVENOUS at 13:45

## 2025-02-05 ENCOUNTER — OFFICE VISIT (OUTPATIENT)
Dept: CARDIOLOGY | Facility: CLINIC | Age: 75
End: 2025-02-05
Payer: MEDICARE

## 2025-02-05 VITALS
HEIGHT: 67 IN | HEART RATE: 49 BPM | DIASTOLIC BLOOD PRESSURE: 52 MMHG | WEIGHT: 139.56 LBS | BODY MASS INDEX: 21.9 KG/M2 | SYSTOLIC BLOOD PRESSURE: 94 MMHG

## 2025-02-05 DIAGNOSIS — I25.5 CARDIOMYOPATHY, ISCHEMIC: ICD-10-CM

## 2025-02-05 PROCEDURE — 1159F MED LIST DOCD IN RCRD: CPT | Performed by: INTERNAL MEDICINE

## 2025-02-05 PROCEDURE — 3008F BODY MASS INDEX DOCD: CPT | Performed by: INTERNAL MEDICINE

## 2025-02-05 PROCEDURE — 93005 ELECTROCARDIOGRAM TRACING: CPT | Performed by: INTERNAL MEDICINE

## 2025-02-05 PROCEDURE — 1126F AMNT PAIN NOTED NONE PRSNT: CPT | Performed by: INTERNAL MEDICINE

## 2025-02-05 PROCEDURE — 99214 OFFICE O/P EST MOD 30 MIN: CPT | Performed by: INTERNAL MEDICINE

## 2025-02-05 RX ORDER — TRAZODONE HYDROCHLORIDE 50 MG/1
TABLET ORAL EVERY 24 HOURS
COMMUNITY
Start: 2024-08-26

## 2025-02-05 ASSESSMENT — PATIENT HEALTH QUESTIONNAIRE - PHQ9
SUM OF ALL RESPONSES TO PHQ9 QUESTIONS 1 AND 2: 0
2. FEELING DOWN, DEPRESSED OR HOPELESS: NOT AT ALL
1. LITTLE INTEREST OR PLEASURE IN DOING THINGS: NOT AT ALL

## 2025-02-05 ASSESSMENT — COLUMBIA-SUICIDE SEVERITY RATING SCALE - C-SSRS
1. IN THE PAST MONTH, HAVE YOU WISHED YOU WERE DEAD OR WISHED YOU COULD GO TO SLEEP AND NOT WAKE UP?: NO
6. HAVE YOU EVER DONE ANYTHING, STARTED TO DO ANYTHING, OR PREPARED TO DO ANYTHING TO END YOUR LIFE?: NO
2. HAVE YOU ACTUALLY HAD ANY THOUGHTS OF KILLING YOURSELF?: NO

## 2025-02-05 ASSESSMENT — ENCOUNTER SYMPTOMS
DEPRESSION: 0
LOSS OF SENSATION IN FEET: 0
OCCASIONAL FEELINGS OF UNSTEADINESS: 0

## 2025-02-05 ASSESSMENT — PAIN SCALES - GENERAL: PAINLEVEL_OUTOF10: 0-NO PAIN

## 2025-02-05 NOTE — PATIENT INSTRUCTIONS
It was nice to meet you today!    Based on your heart function , you are considered at risk for a life threatening heart rhythm and an ICD would protect you.  The ICD is implanted in the left chest while you are under conscious sedation.  The procedure will take about 2 hours and you will be admitted overnight for observation.  The risks associated with the implant include pneumothorax , perforation, bleeding or infection. These risks are considered low, 1/500-1000.  In the future, the device may deliver a shock to treat a heart rhythm and this may cause discomfort but in most cases is considered life-saving.  In some instances the shock may be delivered inappropriately for a non-life threatening heart rhythm and this would feel the same way.    Your procedure would be scheduled for at [place] [address].  You will not be able to drive for 1 week  and you will need to keep the incision dry for one week after surgery.  In addition you will be asked to refrain from lifting your left arm over your head for 4 weeks, which means no golf or other sporting activity.  Please have your blood drawn today  Do not eat after midnight the night before the procedure.    Do not take Farxiga for 3 days prior ( last dose Thursday  before the procedure).  Only take you metoprolol the morning of the procedure.  We will give you the rest of your medications after.      Call us with any questions [phone].

## 2025-02-05 NOTE — PROGRESS NOTES
Referred by Sy Leblanc / ANTHONY Gutierrez  for  ICM, consideration of primary prevention ICD  Chief Complaint   Patient presents with    New Patient Visit    Cardiomyopathy     Patient referred by Dr. Leblanc for implantable cardioverter-defibrillator consideration. RIVKA Martins RN         History Of Present Illness:    Anurag Waite is a 74 y.o. year old male patient with h/o CAD, coronary dissection, cardiogenic shock, HFrEF  being referred for consideration of primary prevention ICD.  Anurag Waite is a 74 y.o. male from [place], [place], referred by Dr. Abel Gutierrez for consultation regarding management of heart failure. He was hospitalized at [facility] between 11/6-11/9/2024.     The patient has a history of ischemic cardiomyopathy secondary to iatrogenic LM-LAD dissection (8/2024) during PCI of LCX, which subsequently required stenting of LM, LM-LAD, and LAD. In this setting he suffered cardiogenic shock, VT, and respiratory failure requiring intubation.      He was hospitalized at [facility] between 11/6-11/9/2024.   He came for testing - and had medication change - prior to this he was SOB with minimal exertion and had fluid retention. NYHA II-III He initiated cardiac rehab.  Currently denies shortness of breath and denies orthopnea.  He does note lower extremity edema at the end of the day.  Currently NYHA Class II    He does not hunt. He has not fractured his clavicle previously.  Past Medical History:  Past Medical History:   Diagnosis Date    BPH (benign prostatic hyperplasia)     CHF (congestive heart failure)     Coronary artery disease     HLD (hyperlipidemia)     Hypertension     Ischemic cardiomyopathy     MI (myocardial infarction) (Multi)     PAD (peripheral artery disease) (CMS-[facility])     Ulcerative colitis     VT (ventricular tachycardia) (Multi)     Vascular surgery July 2024- he had right leg weakness during a stress test and he had surgery.   August 2024 referred for cath - dissection -> stenting emergent.      Past Surgical History:  Past Surgical History:   Procedure Laterality Date    CARDIAC CATHETERIZATION N/A 11/6/2024    Procedure: Left & Right Heart Cath w Angiography & LV;  Surgeon: Lobo Sousa DO;  Location: [place] [address] Cardiac Cath Lab;  Service: Cardiovascular;  Laterality: N/A;    CAROTID ENDARTERECTOMY      CORONARY ANGIOPLASTY      HERNIA REPAIR            Social History:  Caffeine: 1 coke qod  Cigarettes: former quit 7/2024 50 [address]   ETOH: 3 beers/day  Drugs: med cannibus  Exercise: cardiac rehab  Occ: retired  Marital status: m     Family History:  Family History   Problem Relation Name Age of Onset    Heart attack Father      Coronary artery disease Brother      History of coronary artery bypass graft Brother      Significant family hx of CAD       Allergies:  No Known Allergies     Outpatient Medications:  Current Outpatient Medications   Medication Instructions    aspirin 81 mg, oral, Daily    atorvastatin (LIPITOR) 80 mg, oral, Daily    budesonide EC (ENTOCORT EC) 9 mg, oral, Daily    calcium carbonate-vitamin D3 (Oyster Shell Calcium-Vit D3) 500 mg-5 mcg (200 unit) tablet 1 tablet, 2 times daily    cholecalciferol (VITAMIN D-3) 2,000 Units, Daily    clopidogrel (PLAVIX) 75 mg, oral, Daily    dapagliflozin propanediol (FARXIGA) 10 mg, oral, Daily    escitalopram (LEXAPRO) 10 mg, Daily    finasteride (PROSCAR) 5 mg, Daily    furosemide (LASIX) 40 mg, oral, See admin instructions, Take 2 tablets as needed for swelling/weight gain of 2+lbs in 1-2 days or as directed by Dr. Leblanc.    loperamide (IMODIUM) 2 mg, oral, 4 times daily PRN    medical cannabis 1 each, Nightly    melatonin 5 mg, Nightly    metoprolol succinate XL (TOPROL-XL) 25 mg, oral, Daily, Do not crush or chew.    multivitamin tablet 1 tablet, Daily    nitroglycerin (Nitrostat) 0.4 mg SL tablet 1 tablet, Every 5 min PRN    sacubitriL-valsartan (Entresto) 24-26 mg tablet 1 tablet, oral, 2 times daily    spironolactone  "(ALDACTONE) 37.5 mg, oral, Daily    traZODone (Desyrel) 50 mg tablet Every 24 hours         Last Recorded Vitals:      1/30/2025     2:23 PM 1/30/2025     2:28 PM 1/30/2025     2:36 PM 1/30/2025     2:42 PM 1/30/2025     2:47 PM 2/5/2025     9:32 AM 2/5/2025     9:38 AM   Vitals   Systolic 100 110 96 98 97 104 94   Diastolic 57 53 55 56 56 58 52   BP Location      Right arm Left arm   Heart Rate 57 59 58 63 60 51 49   Resp 24 18 19 19 13     Height      1.702 m (5' 7\")    Weight (lb)      139.56    BMI      21.86 kg/m2    BSA (m2)      1.73 m2    Visit Report      Report Report        Physical Exam:  Physical Exam  Constitutional:       Appearance: Normal appearance. He is normal weight.   Neck:      Vascular: No carotid bruit.   Cardiovascular:      Rate and Rhythm: Regular rhythm. Bradycardia present.      Chest Wall: PMI is not displaced.      Pulses: Normal pulses.      Heart sounds: Normal heart sounds, S1 normal and S2 normal. No murmur heard.     No S3 or S4 sounds.   Pulmonary:      Effort: Pulmonary effort is normal.      Breath sounds: Normal breath sounds. No rales.   Musculoskeletal:      Right lower leg: No edema.      Left lower leg: No edema.   Skin:     General: Skin is warm and dry.   Neurological:      General: No focal deficit present.      Mental Status: He is alert and oriented to person, place, and time.   Psychiatric:         Mood and Affect: Mood normal.         Behavior: Behavior normal.         Judgment: Judgment normal.           Last Cardiology Tests:  ECG:  ECG 12 Lead  02/05/2025    SB 49 bpm LAD, AWMI        Electrocardiogram 12 Lead 11/06/2024            Echo:  Transesophageal Echo (ANGELICA) 01/30/2025     CONCLUSIONS:   1. Left ventricular ejection fraction is moderately decreased, by visual estimate at 30-35%.   2. There is normal right ventricular global systolic function.   3. Mild to moderate aortic valve regurgitation.   4. Moderate mitral valve regurgitation.   5. The regurgitant " jet has a centrally directed and an eccentric laterally directed jet component. The mechanism appears mixed due to LV remodeling and posterior leaflet restriction. The MR vena contracta measured 0.54 cm. All 4 pulmonary veins assessed, flow pattern varied between normal and blunted. No systolic flow reversal.   6. The posterior mitral leaflet measured 1.1 cm in the ME LAX view.     QUANTITATIVE DATA SUMMARY:     LV SYSTOLIC FUNCTION:                       Normal Ranges:  EF-Visual:      33 %  LV EF Reported: 33 %      Transthoracic Echo Complete 11/04/2024       CONCLUSIONS:   1. Left ventricular ejection fraction is severely decreased, calculated by Mora's biplane at 29%.   2. Multiple segmental abnormalities exist. See findings.   3. There are multiple left ventricular wall motion abnormalities.   4. Spectral Doppler shows a Grade III (restrictive pattern) of left ventricular diastolic filling with an elevated left atrial pressure.   5. Left ventricular cavity size is severely dilated.   6. No left ventricular thrombus visualized.   7. There is normal right ventricular global systolic function.   8. Moderately enlarged right ventricle.   9. The left atrium is severely dilated.  10. The right atrium is severely dilated.  11. Mild to moderate mitral valve regurgitation.  12. Mild to moderate tricuspid regurgitation visualized.  13. Severely elevated right ventricular systolic pressure.  14. Mild to moderate aortic valve regurgitation.       LV Ejection Fraction:  EF   Date/Time Value Ref Range Status   01/30/2025 02:56 PM 33 %    12/26/2024 11:14 AM 30 %    11/25/2024 01:41 PM 25 %        Cath:  Cardiac Catheterization Procedure 11/06/2024    CONCLUSIONS:   1. Indication: Stage C Ichemic Chronic reduced ejection fraction Heart failure.   2. Left heart cath revealed patent, LM, LAD and LCX stents. RCA non obstructive.   3. LVEDP of 13-15mmHg.   4. Right heart cath revealed normal resting hemodynamics with RA 6, RV  56/3(8), PA 56/19(32), PCPW 18mmHg with large V wave (28mmHg).   5. Mary Jane CO 4.5 l/min and CI of 2.6 L/Min/m2.      Lab review: I have Chemistry CMP:   Lab Results   Component Value Date    ALBUMIN 3.0 (L) 11/09/2024    CALCIUM 8.9 02/05/2025    CO2 23 02/05/2025    CREATININE 0.90 02/05/2025    GLUCOSE 89 02/05/2025    BILITOT 0.6 11/06/2024    PROT 7.5 11/06/2024    ALT 33 11/06/2024    AST 29 11/06/2024    ALKPHOS 77 11/06/2024   , Chemistry BMP   Lab Results   Component Value Date    GLUCOSE 89 02/05/2025    CALCIUM 8.9 02/05/2025    CO2 23 02/05/2025    CREATININE 0.90 02/05/2025   , and CBC:  Lab Results   Component Value Date    WBC 4.7 02/05/2025    RBC 4.50 02/05/2025    HGB 13.4 02/05/2025    HCT 41.8 02/05/2025    MCV 92.9 02/05/2025    MCH 29.8 02/05/2025    MCHC 32.1 02/05/2025    RDW 15.8 (H) 02/05/2025    MPV 10.5 02/05/2025    NRBC 0.0 11/09/2024   BNP 11/2024   981    Assessment/Plan   Problem List Items Addressed This Visit             ICD-10-CM    Cardiomyopathy, ischemic I25.5    Relevant Orders    ECG 12 lead (Clinic Performed)    Basic metabolic panel (Completed)    CBC and Auto Differential (Completed)     Patient with onset of cardiogenic shock/ AMI 8/2024  now on guideline directed medical therapy, euvolemic on exam, repeat LVEF assessment shows EF < 36%.  We discussed ICD for primary prevention.    The [place] SDM tool was used for shared decision making during this clinic visit. The potential benefits and risks of the procedure were reviewed with the patient based on the best available evidence. Decisions that were made took into account the patient's health goals, preferences, and values. All the patient's questions were addressed.     Recommending a transvenous ICD to offer bradycardia pacing as his resting heart rate today is in the forties and ATP will be available for VT termination if needed.     Ruthy Guadarrama MD

## 2025-02-05 NOTE — H&P (VIEW-ONLY)
Referred by Sy Leblanc / ANTHONY Gutierrez  for  ICM, consideration of primary prevention ICD  Chief Complaint   Patient presents with    New Patient Visit    Cardiomyopathy     Patient referred by Dr. Leblanc for implantable cardioverter-defibrillator consideration. RIVKA Martins RN         History Of Present Illness:    Anurag Waite is a 74 y.o. year old male patient with h/o CAD, coronary dissection, cardiogenic shock, HFrEF  being referred for consideration of primary prevention ICD.  Anurag Waite is a 74 y.o. male from [place], [place], referred by Dr. Abel Gutierrez for consultation regarding management of heart failure. He was hospitalized at [facility] between 11/6-11/9/2024.     The patient has a history of ischemic cardiomyopathy secondary to iatrogenic LM-LAD dissection (8/2024) during PCI of LCX, which subsequently required stenting of LM, LM-LAD, and LAD. In this setting he suffered cardiogenic shock, VT, and respiratory failure requiring intubation.      He was hospitalized at [facility] between 11/6-11/9/2024.   He came for testing - and had medication change - prior to this he was SOB with minimal exertion and had fluid retention. NYHA II-III He initiated cardiac rehab.  Currently denies shortness of breath and denies orthopnea.  He does note lower extremity edema at the end of the day.  Currently NYHA Class II    He does not hunt. He has not fractured his clavicle previously.  Past Medical History:  Past Medical History:   Diagnosis Date    BPH (benign prostatic hyperplasia)     CHF (congestive heart failure)     Coronary artery disease     HLD (hyperlipidemia)     Hypertension     Ischemic cardiomyopathy     MI (myocardial infarction) (Multi)     PAD (peripheral artery disease) (CMS-[facility])     Ulcerative colitis     VT (ventricular tachycardia) (Multi)     Vascular surgery July 2024- he had right leg weakness during a stress test and he had surgery.   August 2024 referred for cath - dissection -> stenting emergent.      Past Surgical History:  Past Surgical History:   Procedure Laterality Date    CARDIAC CATHETERIZATION N/A 11/6/2024    Procedure: Left & Right Heart Cath w Angiography & LV;  Surgeon: Lobo Sousa DO;  Location: [place] [address] Cardiac Cath Lab;  Service: Cardiovascular;  Laterality: N/A;    CAROTID ENDARTERECTOMY      CORONARY ANGIOPLASTY      HERNIA REPAIR            Social History:  Caffeine: 1 coke qod  Cigarettes: former quit 7/2024 50 p[address]r   ETOH: 3 beers/day  Drugs: med cannibus  Exercise: cardiac rehab  Occ: retired  Marital status: m     Family History:  Family History   Problem Relation Name Age of Onset    Heart attack Father      Coronary artery disease Brother      History of coronary artery bypass graft Brother      Significant family hx of CAD       Allergies:  No Known Allergies     Outpatient Medications:  Current Outpatient Medications   Medication Instructions    aspirin 81 mg, oral, Daily    atorvastatin (LIPITOR) 80 mg, oral, Daily    budesonide EC (ENTOCORT EC) 9 mg, oral, Daily    calcium carbonate-vitamin D3 (Oyster Shell Calcium-Vit D3) 500 mg-5 mcg (200 unit) tablet 1 tablet, 2 times daily    cholecalciferol (VITAMIN D-3) 2,000 Units, Daily    clopidogrel (PLAVIX) 75 mg, oral, Daily    dapagliflozin propanediol (FARXIGA) 10 mg, oral, Daily    escitalopram (LEXAPRO) 10 mg, Daily    finasteride (PROSCAR) 5 mg, Daily    furosemide (LASIX) 40 mg, oral, See admin instructions, Take 2 tablets as needed for swelling/weight gain of 2+lbs in 1-2 days or as directed by Dr. Leblanc.    loperamide (IMODIUM) 2 mg, oral, 4 times daily PRN    medical cannabis 1 each, Nightly    melatonin 5 mg, Nightly    metoprolol succinate XL (TOPROL-XL) 25 mg, oral, Daily, Do not crush or chew.    multivitamin tablet 1 tablet, Daily    nitroglycerin (Nitrostat) 0.4 mg SL tablet 1 tablet, Every 5 min PRN    sacubitriL-valsartan (Entresto) 24-26 mg tablet 1 tablet, oral, 2 times daily    spironolactone  "(ALDACTONE) 37.5 mg, oral, Daily    traZODone (Desyrel) 50 mg tablet Every 24 hours         Last Recorded Vitals:      1/30/2025     2:23 PM 1/30/2025     2:28 PM 1/30/2025     2:36 PM 1/30/2025     2:42 PM 1/30/2025     2:47 PM 2/5/2025     9:32 AM 2/5/2025     9:38 AM   Vitals   Systolic 100 110 96 98 97 104 94   Diastolic 57 53 55 56 56 58 52   BP Location      Right arm Left arm   Heart Rate 57 59 58 63 60 51 49   Resp 24 18 19 19 13     Height      1.702 m (5' 7\")    Weight (lb)      139.56    BMI      21.86 kg/m2    BSA (m2)      1.73 m2    Visit Report      Report Report        Physical Exam:  Physical Exam  Constitutional:       Appearance: Normal appearance. He is normal weight.   Neck:      Vascular: No carotid bruit.   Cardiovascular:      Rate and Rhythm: Regular rhythm. Bradycardia present.      Chest Wall: PMI is not displaced.      Pulses: Normal pulses.      Heart sounds: Normal heart sounds, S1 normal and S2 normal. No murmur heard.     No S3 or S4 sounds.   Pulmonary:      Effort: Pulmonary effort is normal.      Breath sounds: Normal breath sounds. No rales.   Musculoskeletal:      Right lower leg: No edema.      Left lower leg: No edema.   Skin:     General: Skin is warm and dry.   Neurological:      General: No focal deficit present.      Mental Status: He is alert and oriented to person, place, and time.   Psychiatric:         Mood and Affect: Mood normal.         Behavior: Behavior normal.         Judgment: Judgment normal.           Last Cardiology Tests:  ECG:  ECG 12 Lead  02/05/2025    SB 49 bpm LAD, AWMI        Electrocardiogram 12 Lead 11/06/2024            Echo:  Transesophageal Echo (ANGELICA) 01/30/2025     CONCLUSIONS:   1. Left ventricular ejection fraction is moderately decreased, by visual estimate at 30-35%.   2. There is normal right ventricular global systolic function.   3. Mild to moderate aortic valve regurgitation.   4. Moderate mitral valve regurgitation.   5. The regurgitant " jet has a centrally directed and an eccentric laterally directed jet component. The mechanism appears mixed due to LV remodeling and posterior leaflet restriction. The MR vena contracta measured 0.54 cm. All 4 pulmonary veins assessed, flow pattern varied between normal and blunted. No systolic flow reversal.   6. The posterior mitral leaflet measured 1.1 cm in the ME LAX view.     QUANTITATIVE DATA SUMMARY:     LV SYSTOLIC FUNCTION:                       Normal Ranges:  EF-Visual:      33 %  LV EF Reported: 33 %      Transthoracic Echo Complete 11/04/2024       CONCLUSIONS:   1. Left ventricular ejection fraction is severely decreased, calculated by Mora's biplane at 29%.   2. Multiple segmental abnormalities exist. See findings.   3. There are multiple left ventricular wall motion abnormalities.   4. Spectral Doppler shows a Grade III (restrictive pattern) of left ventricular diastolic filling with an elevated left atrial pressure.   5. Left ventricular cavity size is severely dilated.   6. No left ventricular thrombus visualized.   7. There is normal right ventricular global systolic function.   8. Moderately enlarged right ventricle.   9. The left atrium is severely dilated.  10. The right atrium is severely dilated.  11. Mild to moderate mitral valve regurgitation.  12. Mild to moderate tricuspid regurgitation visualized.  13. Severely elevated right ventricular systolic pressure.  14. Mild to moderate aortic valve regurgitation.       LV Ejection Fraction:  EF   Date/Time Value Ref Range Status   01/30/2025 02:56 PM 33 %    12/26/2024 11:14 AM 30 %    11/25/2024 01:41 PM 25 %        Cath:  Cardiac Catheterization Procedure 11/06/2024    CONCLUSIONS:   1. Indication: Stage C Ichemic Chronic reduced ejection fraction Heart failure.   2. Left heart cath revealed patent, LM, LAD and LCX stents. RCA non obstructive.   3. LVEDP of 13-15mmHg.   4. Right heart cath revealed normal resting hemodynamics with RA 6, RV  56/3(8), PA 56/19(32), PCPW 18mmHg with large V wave (28mmHg).   5. Mary Jane CO 4.5 l/min and CI of 2.6 L/Min/m2.      Lab review: I have Chemistry CMP:   Lab Results   Component Value Date    ALBUMIN 3.0 (L) 11/09/2024    CALCIUM 8.9 02/05/2025    CO2 23 02/05/2025    CREATININE 0.90 02/05/2025    GLUCOSE 89 02/05/2025    BILITOT 0.6 11/06/2024    PROT 7.5 11/06/2024    ALT 33 11/06/2024    AST 29 11/06/2024    ALKPHOS 77 11/06/2024   , Chemistry BMP   Lab Results   Component Value Date    GLUCOSE 89 02/05/2025    CALCIUM 8.9 02/05/2025    CO2 23 02/05/2025    CREATININE 0.90 02/05/2025   , and CBC:  Lab Results   Component Value Date    WBC 4.7 02/05/2025    RBC 4.50 02/05/2025    HGB 13.4 02/05/2025    HCT 41.8 02/05/2025    MCV 92.9 02/05/2025    MCH 29.8 02/05/2025    MCHC 32.1 02/05/2025    RDW 15.8 (H) 02/05/2025    MPV 10.5 02/05/2025    NRBC 0.0 11/09/2024   BNP 11/2024   981    Assessment/Plan   Problem List Items Addressed This Visit             ICD-10-CM    Cardiomyopathy, ischemic I25.5    Relevant Orders    ECG 12 lead (Clinic Performed)    Basic metabolic panel (Completed)    CBC and Auto Differential (Completed)     Patient with onset of cardiogenic shock/ AMI 8/2024  now on guideline directed medical therapy, euvolemic on exam, repeat LVEF assessment shows EF < 36%.  We discussed ICD for primary prevention.    The [place] SDM tool was used for shared decision making during this clinic visit. The potential benefits and risks of the procedure were reviewed with the patient based on the best available evidence. Decisions that were made took into account the patient's health goals, preferences, and values. All the patient's questions were addressed.     Recommending a transvenous ICD to offer bradycardia pacing as his resting heart rate today is in the forties and ATP will be available for VT termination if needed.     Ruthy Guadarrama MD

## 2025-02-06 LAB
ANION GAP SERPL CALCULATED.4IONS-SCNC: 8 MMOL/L (CALC) (ref 7–17)
ATRIAL RATE: 49 BPM
BASOPHILS # BLD AUTO: 28 CELLS/UL (ref 0–200)
BASOPHILS NFR BLD AUTO: 0.6 %
BUN SERPL-MCNC: 16 MG/DL (ref 7–25)
BUN/CREAT SERPL: NORMAL (CALC) (ref 6–22)
CALCIUM SERPL-MCNC: 8.9 MG/DL (ref 8.6–10.3)
CHLORIDE SERPL-SCNC: 106 MMOL/L (ref 98–110)
CO2 SERPL-SCNC: 23 MMOL/L (ref 20–32)
CREAT SERPL-MCNC: 0.9 MG/DL (ref 0.7–1.28)
EGFRCR SERPLBLD CKD-EPI 2021: 90 ML/MIN/1.73M2
EOSINOPHIL # BLD AUTO: 221 CELLS/UL (ref 15–500)
EOSINOPHIL NFR BLD AUTO: 4.7 %
ERYTHROCYTE [DISTWIDTH] IN BLOOD BY AUTOMATED COUNT: 15.8 % (ref 11–15)
GLUCOSE SERPL-MCNC: 89 MG/DL (ref 65–139)
HCT VFR BLD AUTO: 41.8 % (ref 38.5–50)
HGB BLD-MCNC: 13.4 G/DL (ref 13.2–17.1)
LYMPHOCYTES # BLD AUTO: 658 CELLS/UL (ref 850–3900)
LYMPHOCYTES NFR BLD AUTO: 14 %
MCH RBC QN AUTO: 29.8 PG (ref 27–33)
MCHC RBC AUTO-ENTMCNC: 32.1 G/DL (ref 32–36)
MCV RBC AUTO: 92.9 FL (ref 80–100)
MONOCYTES # BLD AUTO: 536 CELLS/UL (ref 200–950)
MONOCYTES NFR BLD AUTO: 11.4 %
NEUTROPHILS # BLD AUTO: 3257 CELLS/UL (ref 1500–7800)
NEUTROPHILS NFR BLD AUTO: 69.3 %
P AXIS: 51 DEGREES
P OFFSET: 192 MS
P ONSET: 133 MS
PLATELET # BLD AUTO: 293 THOUSAND/UL (ref 140–400)
PMV BLD REES-ECKER: 10.5 FL (ref 7.5–12.5)
POTASSIUM SERPL-SCNC: 4.5 MMOL/L (ref 3.5–5.3)
PR INTERVAL: 180 MS
Q ONSET: 223 MS
QRS COUNT: 8 BEATS
QRS DURATION: 88 MS
QT INTERVAL: 478 MS
QTC CALCULATION(BAZETT): 431 MS
QTC FREDERICIA: 446 MS
R AXIS: -51 DEGREES
RBC # BLD AUTO: 4.5 MILLION/UL (ref 4.2–5.8)
SODIUM SERPL-SCNC: 137 MMOL/L (ref 135–146)
T AXIS: 78 DEGREES
T OFFSET: 462 MS
VENTRICULAR RATE: 49 BPM
WBC # BLD AUTO: 4.7 THOUSAND/UL (ref 3.8–10.8)

## 2025-02-24 ENCOUNTER — APPOINTMENT (OUTPATIENT)
Dept: RADIOLOGY | Facility: HOSPITAL | Age: 75
End: 2025-02-24
Payer: MEDICARE

## 2025-02-24 ENCOUNTER — HOSPITAL ENCOUNTER (OUTPATIENT)
Facility: HOSPITAL | Age: 75
Discharge: HOME | End: 2025-02-25
Attending: INTERNAL MEDICINE | Admitting: INTERNAL MEDICINE
Payer: MEDICARE

## 2025-02-24 DIAGNOSIS — I25.5 ISCHEMIC CARDIOMYOPATHY: ICD-10-CM

## 2025-02-24 PROCEDURE — 33249 INSJ/RPLCMT DEFIB W/LEAD(S): CPT | Performed by: INTERNAL MEDICINE

## 2025-02-24 PROCEDURE — 2780000003 HC OR 278 NO HCPCS: Performed by: INTERNAL MEDICINE

## 2025-02-24 PROCEDURE — 2550000001 HC RX 255 CONTRASTS: Performed by: INTERNAL MEDICINE

## 2025-02-24 PROCEDURE — C1892 INTRO/SHEATH,FIXED,PEEL-AWAY: HCPCS | Performed by: INTERNAL MEDICINE

## 2025-02-24 PROCEDURE — 7100000011 HC EXTENDED STAY RECOVERY HOURLY - NURSING UNIT

## 2025-02-24 PROCEDURE — C1781 MESH (IMPLANTABLE): HCPCS | Performed by: INTERNAL MEDICINE

## 2025-02-24 PROCEDURE — 2750000001 HC OR 275 NO HCPCS: Performed by: INTERNAL MEDICINE

## 2025-02-24 PROCEDURE — 99152 MOD SED SAME PHYS/QHP 5/>YRS: CPT | Performed by: INTERNAL MEDICINE

## 2025-02-24 PROCEDURE — C1777 LEAD, AICD, ENDO SINGLE COIL: HCPCS | Performed by: INTERNAL MEDICINE

## 2025-02-24 PROCEDURE — 71045 X-RAY EXAM CHEST 1 VIEW: CPT

## 2025-02-24 PROCEDURE — 2720000007 HC OR 272 NO HCPCS: Performed by: INTERNAL MEDICINE

## 2025-02-24 PROCEDURE — 2500000001 HC RX 250 WO HCPCS SELF ADMINISTERED DRUGS (ALT 637 FOR MEDICARE OP): Performed by: STUDENT IN AN ORGANIZED HEALTH CARE EDUCATION/TRAINING PROGRAM

## 2025-02-24 PROCEDURE — 99153 MOD SED SAME PHYS/QHP EA: CPT | Performed by: INTERNAL MEDICINE

## 2025-02-24 PROCEDURE — 2500000004 HC RX 250 GENERAL PHARMACY W/ HCPCS (ALT 636 FOR OP/ED): Performed by: INTERNAL MEDICINE

## 2025-02-24 PROCEDURE — C1898 LEAD, PMKR, OTHER THAN TRANS: HCPCS | Performed by: INTERNAL MEDICINE

## 2025-02-24 PROCEDURE — 2500000002 HC RX 250 W HCPCS SELF ADMINISTERED DRUGS (ALT 637 FOR MEDICARE OP, ALT 636 FOR OP/ED): Mod: MUE | Performed by: STUDENT IN AN ORGANIZED HEALTH CARE EDUCATION/TRAINING PROGRAM

## 2025-02-24 PROCEDURE — C1721 AICD, DUAL CHAMBER: HCPCS | Performed by: INTERNAL MEDICINE

## 2025-02-24 PROCEDURE — 71045 X-RAY EXAM CHEST 1 VIEW: CPT | Performed by: RADIOLOGY

## 2025-02-24 DEVICE — LEAD 6935M62 DF4 ACTIVE SC US EN
Type: IMPLANTABLE DEVICE | Site: HEART | Status: FUNCTIONAL
Brand: SPRINT QUATTRO SECURE S®

## 2025-02-24 DEVICE — ICD DDPA2D4 COBALT XT DR MRI DF4 USA
Type: IMPLANTABLE DEVICE | Site: CHEST  WALL | Status: FUNCTIONAL
Brand: COBALT™ XT DR MRI SURESCAN™

## 2025-02-24 DEVICE — LEAD 5076-52 CAPSUREFIX NOVUS US EN
Type: IMPLANTABLE DEVICE | Site: HEART | Status: FUNCTIONAL
Brand: CAPSUREFIX® NOVUS

## 2025-02-24 RX ORDER — ASPIRIN 81 MG/1
81 TABLET ORAL DAILY
Status: DISCONTINUED | OUTPATIENT
Start: 2025-02-24 | End: 2025-02-25 | Stop reason: HOSPADM

## 2025-02-24 RX ORDER — PHENYLEPHRINE HCL IN 0.9% NACL 1 MG/10 ML
SYRINGE (ML) INTRAVENOUS AS NEEDED
Status: DISCONTINUED | OUTPATIENT
Start: 2025-02-24 | End: 2025-02-24 | Stop reason: HOSPADM

## 2025-02-24 RX ORDER — ESCITALOPRAM OXALATE 20 MG/1
20 TABLET ORAL DAILY
Status: DISCONTINUED | OUTPATIENT
Start: 2025-02-24 | End: 2025-02-25 | Stop reason: HOSPADM

## 2025-02-24 RX ORDER — DAPAGLIFLOZIN 10 MG/1
10 TABLET, FILM COATED ORAL DAILY
Status: DISCONTINUED | OUTPATIENT
Start: 2025-02-24 | End: 2025-02-25 | Stop reason: HOSPADM

## 2025-02-24 RX ORDER — PSYLLIUM HUSK 0.4 G
1 CAPSULE ORAL 2 TIMES DAILY
Status: DISCONTINUED | OUTPATIENT
Start: 2025-02-24 | End: 2025-02-25 | Stop reason: HOSPADM

## 2025-02-24 RX ORDER — SACUBITRIL AND VALSARTAN 24; 26 MG/1; MG/1
1 TABLET, FILM COATED ORAL 2 TIMES DAILY
Status: DISCONTINUED | OUTPATIENT
Start: 2025-02-24 | End: 2025-02-25 | Stop reason: HOSPADM

## 2025-02-24 RX ORDER — BUDESONIDE 3 MG/1
3 CAPSULE, COATED PELLETS ORAL DAILY
Status: DISCONTINUED | OUTPATIENT
Start: 2025-02-24 | End: 2025-02-25 | Stop reason: HOSPADM

## 2025-02-24 RX ORDER — ATORVASTATIN CALCIUM 80 MG/1
80 TABLET, FILM COATED ORAL DAILY
Status: DISCONTINUED | OUTPATIENT
Start: 2025-02-24 | End: 2025-02-25 | Stop reason: HOSPADM

## 2025-02-24 RX ORDER — FENTANYL CITRATE 50 UG/ML
INJECTION, SOLUTION INTRAMUSCULAR; INTRAVENOUS AS NEEDED
Status: DISCONTINUED | OUTPATIENT
Start: 2025-02-24 | End: 2025-02-24 | Stop reason: HOSPADM

## 2025-02-24 RX ORDER — CEFAZOLIN 1 G/1
INJECTION, POWDER, FOR SOLUTION INTRAVENOUS AS NEEDED
Status: DISCONTINUED | OUTPATIENT
Start: 2025-02-24 | End: 2025-02-24 | Stop reason: HOSPADM

## 2025-02-24 RX ORDER — TRAMADOL HYDROCHLORIDE 50 MG/1
50 TABLET ORAL EVERY 8 HOURS PRN
Status: DISCONTINUED | OUTPATIENT
Start: 2025-02-24 | End: 2025-02-25 | Stop reason: HOSPADM

## 2025-02-24 RX ORDER — SPIRONOLACTONE 25 MG/1
37.5 TABLET ORAL DAILY
Status: DISCONTINUED | OUTPATIENT
Start: 2025-02-24 | End: 2025-02-25 | Stop reason: HOSPADM

## 2025-02-24 RX ORDER — FINASTERIDE 5 MG/1
5 TABLET, FILM COATED ORAL DAILY
Status: DISCONTINUED | OUTPATIENT
Start: 2025-02-24 | End: 2025-02-25 | Stop reason: HOSPADM

## 2025-02-24 RX ORDER — ACETAMINOPHEN 160 MG/5ML
650 SOLUTION ORAL EVERY 4 HOURS PRN
Status: DISCONTINUED | OUTPATIENT
Start: 2025-02-24 | End: 2025-02-25 | Stop reason: HOSPADM

## 2025-02-24 RX ORDER — METOPROLOL SUCCINATE 25 MG/1
25 TABLET, EXTENDED RELEASE ORAL DAILY
Status: DISCONTINUED | OUTPATIENT
Start: 2025-02-24 | End: 2025-02-25 | Stop reason: HOSPADM

## 2025-02-24 RX ORDER — ACETAMINOPHEN 325 MG/1
650 TABLET ORAL EVERY 4 HOURS PRN
Status: DISCONTINUED | OUTPATIENT
Start: 2025-02-24 | End: 2025-02-25 | Stop reason: HOSPADM

## 2025-02-24 RX ORDER — CHOLECALCIFEROL (VITAMIN D3) 25 MCG
2000 TABLET ORAL DAILY
Status: DISCONTINUED | OUTPATIENT
Start: 2025-02-24 | End: 2025-02-25 | Stop reason: HOSPADM

## 2025-02-24 RX ORDER — ACETAMINOPHEN 650 MG/1
650 SUPPOSITORY RECTAL EVERY 4 HOURS PRN
Status: DISCONTINUED | OUTPATIENT
Start: 2025-02-24 | End: 2025-02-25 | Stop reason: HOSPADM

## 2025-02-24 RX ORDER — VANCOMYCIN HYDROCHLORIDE 1 G/20ML
INJECTION, POWDER, LYOPHILIZED, FOR SOLUTION INTRAVENOUS AS NEEDED
Status: DISCONTINUED | OUTPATIENT
Start: 2025-02-24 | End: 2025-02-24 | Stop reason: HOSPADM

## 2025-02-24 RX ORDER — CLOPIDOGREL BISULFATE 75 MG/1
75 TABLET ORAL DAILY
Status: DISCONTINUED | OUTPATIENT
Start: 2025-02-24 | End: 2025-02-25 | Stop reason: HOSPADM

## 2025-02-24 RX ORDER — TRAZODONE HYDROCHLORIDE 100 MG/1
50 TABLET ORAL EVERY 24 HOURS
Status: DISCONTINUED | OUTPATIENT
Start: 2025-02-24 | End: 2025-02-25 | Stop reason: HOSPADM

## 2025-02-24 RX ORDER — ACETAMINOPHEN 500 MG
5 TABLET ORAL NIGHTLY
Status: DISCONTINUED | OUTPATIENT
Start: 2025-02-24 | End: 2025-02-25 | Stop reason: HOSPADM

## 2025-02-24 RX ORDER — BUPIVACAINE HYDROCHLORIDE 5 MG/ML
INJECTION, SOLUTION EPIDURAL; INTRACAUDAL AS NEEDED
Status: DISCONTINUED | OUTPATIENT
Start: 2025-02-24 | End: 2025-02-24 | Stop reason: HOSPADM

## 2025-02-24 RX ORDER — MIDAZOLAM HYDROCHLORIDE 1 MG/ML
INJECTION, SOLUTION INTRAMUSCULAR; INTRAVENOUS AS NEEDED
Status: DISCONTINUED | OUTPATIENT
Start: 2025-02-24 | End: 2025-02-24 | Stop reason: HOSPADM

## 2025-02-24 RX ADMIN — METOPROLOL SUCCINATE 25 MG: 25 TABLET, EXTENDED RELEASE ORAL at 13:32

## 2025-02-24 RX ADMIN — SACUBITRIL AND VALSARTAN 1 TABLET: 24; 26 TABLET, FILM COATED ORAL at 20:43

## 2025-02-24 RX ADMIN — TRAMADOL HYDROCHLORIDE 50 MG: 50 TABLET, COATED ORAL at 17:54

## 2025-02-24 RX ADMIN — ASPIRIN 81 MG: 81 TABLET, COATED ORAL at 13:32

## 2025-02-24 RX ADMIN — SACUBITRIL AND VALSARTAN 1 TABLET: 24; 26 TABLET, FILM COATED ORAL at 13:32

## 2025-02-24 RX ADMIN — SPIRONOLACTONE 37.5 MG: 25 TABLET, FILM COATED ORAL at 13:32

## 2025-02-24 RX ADMIN — DAPAGLIFLOZIN 10 MG: 10 TABLET, FILM COATED ORAL at 15:13

## 2025-02-24 RX ADMIN — Medication 5 MG: at 20:44

## 2025-02-24 RX ADMIN — TRAZODONE HYDROCHLORIDE 50 MG: 100 TABLET ORAL at 20:43

## 2025-02-24 RX ADMIN — ESCITALOPRAM OXALATE 20 MG: 20 TABLET ORAL at 13:32

## 2025-02-24 RX ADMIN — Medication 1 TABLET: at 20:44

## 2025-02-24 RX ADMIN — ACETAMINOPHEN 650 MG: 325 TABLET ORAL at 15:52

## 2025-02-24 RX ADMIN — BUDESONIDE 3 MG: 3 CAPSULE, COATED PELLETS ORAL at 15:13

## 2025-02-24 RX ADMIN — ATORVASTATIN CALCIUM 80 MG: 80 TABLET, FILM COATED ORAL at 13:32

## 2025-02-24 RX ADMIN — FINASTERIDE 5 MG: 5 TABLET, FILM COATED ORAL at 15:13

## 2025-02-24 RX ADMIN — CLOPIDOGREL BISULFATE 75 MG: 75 TABLET ORAL at 13:32

## 2025-02-24 SDOH — SOCIAL STABILITY: SOCIAL INSECURITY: HAVE YOU HAD ANY THOUGHTS OF HARMING ANYONE ELSE?: NO

## 2025-02-24 SDOH — SOCIAL STABILITY: SOCIAL INSECURITY: ABUSE: ADULT

## 2025-02-24 SDOH — SOCIAL STABILITY: SOCIAL INSECURITY: DO YOU FEEL UNSAFE GOING BACK TO THE PLACE WHERE YOU ARE LIVING?: NO

## 2025-02-24 SDOH — SOCIAL STABILITY: SOCIAL INSECURITY: ARE THERE ANY APPARENT SIGNS OF INJURIES/BEHAVIORS THAT COULD BE RELATED TO ABUSE/NEGLECT?: NO

## 2025-02-24 SDOH — SOCIAL STABILITY: SOCIAL INSECURITY: HAVE YOU HAD THOUGHTS OF HARMING ANYONE ELSE?: NO

## 2025-02-24 SDOH — SOCIAL STABILITY: SOCIAL INSECURITY: DOES ANYONE TRY TO KEEP YOU FROM HAVING/CONTACTING OTHER FRIENDS OR DOING THINGS OUTSIDE YOUR HOME?: NO

## 2025-02-24 SDOH — SOCIAL STABILITY: SOCIAL INSECURITY: ARE YOU OR HAVE YOU BEEN THREATENED OR ABUSED PHYSICALLY, EMOTIONALLY, OR SEXUALLY BY ANYONE?: NO

## 2025-02-24 SDOH — SOCIAL STABILITY: SOCIAL INSECURITY: WERE YOU ABLE TO COMPLETE ALL THE BEHAVIORAL HEALTH SCREENINGS?: YES

## 2025-02-24 SDOH — SOCIAL STABILITY: SOCIAL INSECURITY: HAS ANYONE EVER THREATENED TO HURT YOUR FAMILY OR YOUR PETS?: NO

## 2025-02-24 SDOH — SOCIAL STABILITY: SOCIAL INSECURITY: DO YOU FEEL ANYONE HAS EXPLOITED OR TAKEN ADVANTAGE OF YOU FINANCIALLY OR OF YOUR PERSONAL PROPERTY?: NO

## 2025-02-24 ASSESSMENT — COGNITIVE AND FUNCTIONAL STATUS - GENERAL
MOBILITY SCORE: 24
DAILY ACTIVITIY SCORE: 24
DAILY ACTIVITIY SCORE: 24
MOBILITY SCORE: 24
PATIENT BASELINE BEDBOUND: NO

## 2025-02-24 ASSESSMENT — LIFESTYLE VARIABLES
HOW MANY STANDARD DRINKS CONTAINING ALCOHOL DO YOU HAVE ON A TYPICAL DAY: 3 OR 4
HOW OFTEN DO YOU HAVE A DRINK CONTAINING ALCOHOL: 4 OR MORE TIMES A WEEK
SKIP TO QUESTIONS 9-10: 0
AUDIT-C TOTAL SCORE: 5
HOW OFTEN DO YOU HAVE 6 OR MORE DRINKS ON ONE OCCASION: NEVER
AUDIT-C TOTAL SCORE: 5

## 2025-02-24 ASSESSMENT — ACTIVITIES OF DAILY LIVING (ADL)
ASSISTIVE_DEVICE: EYEGLASSES
BATHING: INDEPENDENT
DRESSING YOURSELF: INDEPENDENT
HEARING - LEFT EAR: FUNCTIONAL
FEEDING YOURSELF: INDEPENDENT
ADEQUATE_TO_COMPLETE_ADL: YES
TOILETING: INDEPENDENT
WALKS IN HOME: INDEPENDENT
PATIENT'S MEMORY ADEQUATE TO SAFELY COMPLETE DAILY ACTIVITIES?: YES
JUDGMENT_ADEQUATE_SAFELY_COMPLETE_DAILY_ACTIVITIES: YES
GROOMING: INDEPENDENT
HEARING - RIGHT EAR: FUNCTIONAL

## 2025-02-24 ASSESSMENT — PATIENT HEALTH QUESTIONNAIRE - PHQ9
1. LITTLE INTEREST OR PLEASURE IN DOING THINGS: NOT AT ALL
SUM OF ALL RESPONSES TO PHQ9 QUESTIONS 1 & 2: 0
2. FEELING DOWN, DEPRESSED OR HOPELESS: NOT AT ALL

## 2025-02-24 ASSESSMENT — PAIN SCALES - GENERAL
PAINLEVEL_OUTOF10: 4
PAINLEVEL_OUTOF10: 0 - NO PAIN
PAINLEVEL_OUTOF10: 4
PAINLEVEL_OUTOF10: 0 - NO PAIN

## 2025-02-24 ASSESSMENT — COLUMBIA-SUICIDE SEVERITY RATING SCALE - C-SSRS
6. HAVE YOU EVER DONE ANYTHING, STARTED TO DO ANYTHING, OR PREPARED TO DO ANYTHING TO END YOUR LIFE?: NO
2. HAVE YOU ACTUALLY HAD ANY THOUGHTS OF KILLING YOURSELF?: NO
1. IN THE PAST MONTH, HAVE YOU WISHED YOU WERE DEAD OR WISHED YOU COULD GO TO SLEEP AND NOT WAKE UP?: NO

## 2025-02-24 ASSESSMENT — PAIN - FUNCTIONAL ASSESSMENT: PAIN_FUNCTIONAL_ASSESSMENT: 0-10

## 2025-02-24 NOTE — DISCHARGE INSTRUCTIONS
Discharge Instructions for your Cardiac Device   CARDIAC DEVICE CLINIC  [phone]                Incision:   Keep your incision clean and dry for 1 week.  May shower 7 days after the procedure. Do not submerge the incision in a tub, pool, hot tub, or lake for 4 weeks.  Your incision should look better each day. If you notice unusual swelling, redness, drainage or fever greater than 100 degrees, please call the Device Clinic or your Doctor's office.  Avoid using deodorants, powders, creams, lotions, etc on your incision for 4 weeks.   There are no stitches to be removed.  If you received a “glue” closure this may appear purple-gray and does not get removed but wears away slowly on its own.  Steri-strips (small white bandages) may be removed in one week or they may fall off on their own earlier.    Pain:  It is normal for the area around the incision to be tender for a few weeks following surgery. Pain relievers such as Tylenol or Motrin (whichever you can take) are usually sufficient for pain relief. If the pain gets worse instead of better than please call the Device Clinic or your Doctor.    Activity:  If you have a new device and new leads placed than avoid raising your arm above shoulder level for 4 weeks. Do no  anything weighing more than 15 pounds.   Avoid exercising with the arm on the side of your pacemaker.  So no golf, swimming, tennis, bowling etc for 4 weeks.  Driving: If you were driving prior to your procedure, you may resume driving in 1 week. If you experienced a loss of consciousness prior to your procedure, you should verify with your Doctor when you are able to resume driving.    ID CARD:  It is important to carry your device ID card with you at all times.   Inform doctors and health care providers that you have a pacemaker or defibrillator.    Electromagnetic Interference:  Microwave ovens are safe to use.  Cellular phones should be held to the opposite ear from your device. Do not  carry your phone in your shirt pocket. Some i-phones that self-charge can interfere with your device so be sure to keep it away from your pacemaker/defibrillator.   Read the Patient Booklet for more information. You may call either the Device Clinic [phone]  or the patient services of the device  with questions about specific electrical appliances and interference problems.    IT IS YOUR RESPONSIBILITY TO MAKE AND KEEP APPOINTMENTS.   Please refer to your Device clinic handout.

## 2025-02-24 NOTE — CARE PLAN
The patient's goals for the shift include      The clinical goals for the shift include pt will not use L arm throughout shfit    Over the shift, the patient did not make progress toward the following goals. Barriers to progression include recent surgery. Recommendations to address these barriers include sling.

## 2025-02-24 NOTE — Clinical Note
Patient Clipped and Prepped: left chest. Prepped with ChloraPrep, a minimum of 3 minute dry time, longer if needed, no pooling noted, patient draped in sterile fashion and Duraprep final prep.

## 2025-02-25 ENCOUNTER — APPOINTMENT (OUTPATIENT)
Dept: RADIOLOGY | Facility: HOSPITAL | Age: 75
End: 2025-02-25
Payer: MEDICARE

## 2025-02-25 ENCOUNTER — APPOINTMENT (OUTPATIENT)
Dept: CARDIOLOGY | Facility: HOSPITAL | Age: 75
End: 2025-02-25
Payer: MEDICARE

## 2025-02-25 VITALS
WEIGHT: 135 LBS | OXYGEN SATURATION: 96 % | HEIGHT: 67 IN | RESPIRATION RATE: 17 BRPM | SYSTOLIC BLOOD PRESSURE: 113 MMHG | BODY MASS INDEX: 21.19 KG/M2 | HEART RATE: 61 BPM | DIASTOLIC BLOOD PRESSURE: 58 MMHG | TEMPERATURE: 97.7 F

## 2025-02-25 PROCEDURE — 71046 X-RAY EXAM CHEST 2 VIEWS: CPT | Performed by: RADIOLOGY

## 2025-02-25 PROCEDURE — 71046 X-RAY EXAM CHEST 2 VIEWS: CPT

## 2025-02-25 PROCEDURE — 2500000001 HC RX 250 WO HCPCS SELF ADMINISTERED DRUGS (ALT 637 FOR MEDICARE OP): Performed by: STUDENT IN AN ORGANIZED HEALTH CARE EDUCATION/TRAINING PROGRAM

## 2025-02-25 PROCEDURE — 93005 ELECTROCARDIOGRAM TRACING: CPT

## 2025-02-25 PROCEDURE — 7100000011 HC EXTENDED STAY RECOVERY HOURLY - NURSING UNIT

## 2025-02-25 RX ADMIN — TRAMADOL HYDROCHLORIDE 50 MG: 50 TABLET, COATED ORAL at 07:39

## 2025-02-25 NOTE — DISCHARGE SUMMARY
Discharge Diagnosis  Ischemic cardiomyopathy    Issues Requiring Follow-Up   EP device clinic follow-up on 3/26/2025    Hospital Course   75 yo M with PMH of obstructive CAD c/b OSH iatrogenic coronary dissection (LM-LAD) s/p multiple PCIs (LM, LM-LAD, and LAD), and ICM/HFrEF refractory to GDMT, who underwent [employer] primary prevention dcICD implantation without immediate complications. His hospital course was uncomplicated and his telemetry showed frequent non-sustained VT episodes. Patient will be discharged home with stable conditions.    Pertinent Physical Exam At Time of Discharge  Physical Exam  Vitals and nursing note reviewed.   Cardiovascular:      Rate and Rhythm: Normal rate and regular rhythm.      Pulses: Normal pulses.      Heart sounds: Normal heart sounds.   Pulmonary:      Effort: Pulmonary effort is normal.      Breath sounds: Normal breath sounds.   Musculoskeletal:         General: Normal range of motion.   Skin:     General: Skin is warm.      Capillary Refill: Capillary refill takes less than 2 seconds.      Comments: NO significant bleeding, oozing, hematoma, or tenderness at L CIED pocket   Neurological:      General: No focal deficit present.      Mental Status: He is oriented to person, place, and time. Mental status is at baseline.         Home Medications     Medication List      CHANGE how you take these medications     budesonide EC 3 mg 24 hr capsule; Commonly known as: Entocort EC; Take 3   capsules (9 mg) by mouth once daily.; What changed: how much to take     CONTINUE taking these medications     aspirin 81 mg EC tablet; Take 1 tablet (81 mg) by mouth once daily.   atorvastatin 80 mg tablet; Commonly known as: Lipitor; Take 1 tablet (80   mg) by mouth once daily.   cholecalciferol 50 mcg (2,000 unit) capsule; Commonly known as: Vitamin   D-3   clopidogrel 75 mg tablet; Commonly known as: Plavix; Take 1 tablet (75   mg) by mouth once daily.   dapagliflozin propanediol 10 mg;  Commonly known as: Farxiga; Take 1   tablet (10 mg) by mouth once daily.   escitalopram 20 mg tablet; Commonly known as: Lexapro   finasteride 5 mg tablet; Commonly known as: Proscar   furosemide 20 mg tablet; Commonly known as: Lasix; Take 2 tablets (40   mg) by mouth see administration instructions. Take 2 tablets as needed for   swelling/weight gain of 2+lbs in 1-2 days or as directed by Dr. Leblanc.   loperamide 2 mg capsule; Commonly known as: Imodium; Take 1 capsule (2   mg) by mouth 4 times a day as needed for diarrhea.   medical cannabis   melatonin 5 mg tablet   metoprolol succinate XL 25 mg 24 hr tablet; Commonly known as:   Toprol-XL; Take 1 tablet (25 mg) by mouth once daily. Do not crush or   chew.   multivitamin tablet   Oyster Shell Calcium-Vit D3 500 mg-5 mcg (200 unit) tablet; Generic   drug: calcium carbonate-vitamin D3   sacubitriL-valsartan 24-26 mg tablet; Commonly known as: Entresto; Take   1 tablet by mouth 2 times a day.   spironolactone 25 mg tablet; Commonly known as: Aldactone; Take 1.5   tablets (37.5 mg) by mouth once daily.   traZODone 50 mg tablet; Commonly known as: Desyrel     STOP taking these medications     nitroglycerin 0.4 mg SL tablet; Commonly known as: Nitrostat       Outpatient Follow-Up  Future Appointments   Date Time Provider Department Center   3/26/2025  9:30 AM [facility] [id number] [facility] [place] Kade Epps MD

## 2025-02-25 NOTE — CARE PLAN
The patient's goals for the shift include      The clinical goals for the shift include pt will not use L arm throughout shfit

## 2025-02-25 NOTE — PROGRESS NOTES
Pharmacy Medication History Review    Anurag Waite is a 74 y.o. male admitted for Ischemic cardiomyopathy. Pharmacy reviewed the patient's [id number] medications for accuracy.    Medications ADDED:  none  Medications CHANGED:  Patient said he is back to taking 3 capsules daily of budesonide (9mg/day)  Medications REMOVED:   Nitroglycerin (being stopped at discharge; patient said he has never taken a dose)     The list below reflects the updated PTA list.   Prior to Admission Medications   Prescriptions Last Dose Informant   aspirin 81 mg EC tablet 2/23/2025 Self, Spouse/Significant Other   Sig: Take 1 tablet (81 mg) by mouth once daily.   atorvastatin (Lipitor) 80 mg tablet 2/23/2025 Self, Spouse/Significant Other   Sig: Take 1 tablet (80 mg) by mouth once daily.   budesonide EC (Entocort EC) 3 mg 24 hr capsule  Self, Spouse/Significant Other   Sig: Take 3 capsules (9 mg) by mouth once daily.   calcium carbonate-vitamin D3 (Oyster Shell Calcium-Vit D3) 500 mg-5 mcg (200 unit) tablet Past Week Self, Spouse/Significant Other   Sig: Take 1 tablet by mouth 2 times a day.   cholecalciferol (Vitamin D-3) 50 mcg (2,000 unit) capsule Past Week Self, Spouse/Significant Other   Sig: Take 1 capsule (50 mcg) by mouth once daily.   clopidogrel (Plavix) 75 mg tablet 2/23/2025 Morning Self, Spouse/Significant Other   Sig: Take 1 tablet (75 mg) by mouth once daily.   dapagliflozin propanediol (Farxiga) 10 mg Past Week Self, Spouse/Significant Other   Sig: Take 1 tablet (10 mg) by mouth once daily.   escitalopram (Lexapro) 20 mg tablet Past Week Self, Spouse/Significant Other   Sig: Take 0.5 tablets (10 mg) by mouth once daily.   Patient taking differently: Take 1 tablet (20 mg) by mouth once daily.   finasteride (Proscar) 5 mg tablet 2/23/2025 Self, Spouse/Significant Other   Sig: Take 1 tablet (5 mg) by mouth once daily.   furosemide (Lasix) 20 mg tablet 2/23/2025 Self, Spouse/Significant Other   Sig: Take 2 tablets (40 mg)  by mouth see administration instructions. Take 2 tablets as needed for swelling/weight gain of 2+lbs in 1-2 days or as directed by Dr. Leblanc.   loperamide (Imodium) 2 mg capsule Past Week Self, Spouse/Significant Other   Sig: Take 1 capsule (2 mg) by mouth 4 times a day as needed for diarrhea.   medical cannabis 2/23/2025 Self, Spouse/Significant Other   Sig: Take 1 each by mouth once daily at bedtime.   melatonin 5 mg tablet 2/23/2025 Self, Spouse/Significant Other   Sig: Take 1 tablet (5 mg) by mouth once daily at bedtime.   metoprolol succinate XL (Toprol-XL) 25 mg 24 hr tablet 2/23/2025 Self, Spouse/Significant Other   Sig: Take 1 tablet (25 mg) by mouth once daily. Do not crush or chew.   multivitamin tablet 2/23/2025 Self, Spouse/Significant Other   Sig: Take 1 tablet by mouth once daily.   nitroglycerin (Nitrostat) 0.4 mg SL tablet More than a month Spouse/Significant Other   Sig: Place 1 tablet (0.4 mg) under the tongue every 5 minutes if needed.   Patient not taking: Reported on 2/24/2025   sacubitriL-valsartan (Entresto) 24-26 mg tablet 2/23/2025 Self, Spouse/Significant Other   Sig: Take 1 tablet by mouth 2 times a day.   spironolactone (Aldactone) 25 mg tablet 2/23/2025 Self, Spouse/Significant Other   Sig: Take 1.5 tablets (37.5 mg) by mouth once daily.   traZODone (Desyrel) 50 mg tablet 2/23/2025 Self, Spouse/Significant Other   Sig: Take 1 tablet (50 mg) by mouth once daily at bedtime.      Facility-Administered Medications: None        Patient declines M2B at discharge.     Sources:   [facility]  Pharmacy dispense history  Patient interview patient and family member in room clarified home medications with me  Chart Review  Care Everywhere    Additional Comments:  Budesonide EC 3mg capsule-  patient said he is back to taking 3 capsules daily-  dosing in home med list updated to reflect this      Dale Doyle, PharmD  Transitions of Care Pharmacist  02/25/25     Secure Chat preferred   If no  "response call [id number] or Vocera \"Med Rec\"    "

## 2025-02-28 ENCOUNTER — HOSPITAL ENCOUNTER (OUTPATIENT)
Dept: CARDIOLOGY | Facility: CLINIC | Age: 75
Discharge: HOME | End: 2025-02-28
Payer: MEDICARE

## 2025-02-28 DIAGNOSIS — I44.2 CHB (COMPLETE HEART BLOCK): ICD-10-CM

## 2025-03-02 LAB
ATRIAL RATE: 49 BPM
P AXIS: 51 DEGREES
P OFFSET: 192 MS
P ONSET: 133 MS
PR INTERVAL: 180 MS
Q ONSET: 223 MS
QRS COUNT: 8 BEATS
QRS DURATION: 88 MS
QT INTERVAL: 478 MS
QTC CALCULATION(BAZETT): 431 MS
QTC FREDERICIA: 446 MS
R AXIS: -51 DEGREES
T AXIS: 78 DEGREES
T OFFSET: 462 MS
VENTRICULAR RATE: 49 BPM

## 2025-03-03 LAB
ATRIAL RATE: 60 BPM
P AXIS: 25 DEGREES
PR INTERVAL: 232 MS
Q ONSET: 213 MS
QRS COUNT: 10 BEATS
QRS DURATION: 86 MS
QT INTERVAL: 410 MS
QTC CALCULATION(BAZETT): 410 MS
QTC FREDERICIA: 410 MS
R AXIS: -57 DEGREES
T AXIS: 94 DEGREES
T OFFSET: 418 MS
VENTRICULAR RATE: 60 BPM

## 2025-03-26 ENCOUNTER — HOSPITAL ENCOUNTER (OUTPATIENT)
Dept: CARDIOLOGY | Facility: CLINIC | Age: 75
Discharge: HOME | End: 2025-03-26
Payer: MEDICARE

## 2025-03-26 DIAGNOSIS — I44.2 CHB (COMPLETE HEART BLOCK): Primary | ICD-10-CM

## 2025-03-26 DIAGNOSIS — Z95.810 AUTOMATIC IMPLANTABLE CARDIOVERTER-DEFIBRILLATOR IN SITU: ICD-10-CM

## 2025-03-26 DIAGNOSIS — I44.2 CHB (COMPLETE HEART BLOCK): ICD-10-CM

## 2025-03-26 DIAGNOSIS — I25.5 CARDIOMYOPATHY, ISCHEMIC: ICD-10-CM

## 2025-03-26 PROCEDURE — 93283 PRGRMG EVAL IMPLANTABLE DFB: CPT

## 2025-04-08 ENCOUNTER — APPOINTMENT (OUTPATIENT)
Dept: CARDIOLOGY | Facility: CLINIC | Age: 75
End: 2025-04-08
Payer: MEDICARE

## 2025-04-08 VITALS
BODY MASS INDEX: 22.91 KG/M2 | HEART RATE: 77 BPM | DIASTOLIC BLOOD PRESSURE: 74 MMHG | WEIGHT: 146 LBS | SYSTOLIC BLOOD PRESSURE: 111 MMHG | HEIGHT: 67 IN | TEMPERATURE: 97.5 F

## 2025-04-08 DIAGNOSIS — I25.5 CARDIOMYOPATHY, ISCHEMIC: ICD-10-CM

## 2025-04-08 DIAGNOSIS — I50.22 CHRONIC SYSTOLIC HEART FAILURE: Primary | ICD-10-CM

## 2025-04-08 DIAGNOSIS — I25.10 CAD (CORONARY ARTERY DISEASE): ICD-10-CM

## 2025-04-08 PROCEDURE — 1159F MED LIST DOCD IN RCRD: CPT | Performed by: INTERNAL MEDICINE

## 2025-04-08 PROCEDURE — 1036F TOBACCO NON-USER: CPT | Performed by: INTERNAL MEDICINE

## 2025-04-08 PROCEDURE — 3008F BODY MASS INDEX DOCD: CPT | Performed by: INTERNAL MEDICINE

## 2025-04-08 PROCEDURE — 99024 POSTOP FOLLOW-UP VISIT: CPT | Performed by: INTERNAL MEDICINE

## 2025-04-08 RX ORDER — METOPROLOL SUCCINATE 50 MG/1
50 TABLET, EXTENDED RELEASE ORAL DAILY
Qty: 90 TABLET | Refills: 3 | Status: SHIPPED | OUTPATIENT
Start: 2025-04-08 | End: 2026-04-08

## 2025-04-08 NOTE — PATIENT INSTRUCTIONS
Thank you for coming to see us today! To reach Dr. Leblanc's office please call [phone]. Fax [phone]. Call [phone] to schedule an appointment. You may also contact the HF RNs at [email or web address]  (Please include your name and date of birth)  For MEDICATION REFILLS, please call [phone] option [phone] then option 1.

## 2025-04-08 NOTE — PROGRESS NOTES
"    Heart Failure Cardiology    Anurag Waite is a 74 y.o. male from [place], [place], previously referred by Dr. Abel Gutierrez for consultation regarding management of heart failure.     Please see my prior notes for details.    Since I last saw him he successfully underwent ICD implantation.  He completed cardiac rehab he was able to walk more than a mile on treadmill.  He denies symptoms.    Studies:    Evaluations:  Left heart cath - patent Left sided stents; No significant disease  Right heart cath - Co/CI= 4.5/2.6; RA mean 6; RV 55/3; PA 56/19, PCWP with large V wave- 19/43(mean 28)  Echocardiogram - LVEF 29%, LVIDD 5.69cm, moderate MR, moderate TR    Echocardiogram 11/25/2024  1. Left ventricular ejection fraction is severely decreased, by visual estimate at 25%.   2. There is global hypokinesis of the left ventricle with minor regional variations.   3. Spectral Doppler shows a Grade III (restrictive pattern) of left ventricular diastolic filling with an elevated left atrial pressure.   4. Left ventricular cavity size is mildly dilated.   5. There is moderate eccentric left ventricular hypertrophy.   6. There is normal right ventricular global systolic function.   7. The left atrium is severely dilated.   8. Moderate mitral valve regurgitation.   9. Moderately elevated right ventricular systolic pressure.  10. Mild to moderate aortic valve regurgitation.    Exam: /74 (BP Location: Right arm, Patient Position: Sitting, BP Cuff Size: Adult)   Pulse 77   Temp 36.4 °C (97.5 °F) (Oral)   Ht 1.702 m (5' 7\")   Wt 66.2 kg (146 lb)   BMI 22.87 kg/m²   No JVD  RRR  No LE edema  Well appearing    Current Outpatient Medications   Medication Instructions    aspirin 81 mg, oral, Daily    atorvastatin (LIPITOR) 80 mg, oral, Daily    budesonide EC (ENTOCORT EC) 9 mg, oral, Daily    cholecalciferol (VITAMIN D-3) 2,000 Units, Daily    clopidogrel (PLAVIX) 75 mg, oral, Daily    dapagliflozin propanediol (FARXIGA) 10 mg, oral, " Daily    escitalopram (LEXAPRO) 10 mg, Daily    finasteride (PROSCAR) 5 mg, Daily    furosemide (LASIX) 40 mg, oral, See admin instructions, Take 2 tablets as needed for swelling/weight gain of 2+lbs in 1-2 days or as directed by Dr. Leblanc.    loperamide (IMODIUM) 2 mg, oral, 4 times daily PRN    medical cannabis 1 each, Nightly    melatonin 5 mg, Nightly    metoprolol succinate XL (TOPROL-XL) 25 mg, oral, Daily, Do not crush or chew.    multivitamin tablet 1 tablet, Daily    sacubitriL-valsartan (Entresto) 24-26 mg tablet 1 tablet, oral, 2 times daily    spironolactone (ALDACTONE) 37.5 mg, oral, Daily    traZODone (DESYREL) 50 mg, Nightly     Past medical history (non-cardiac):  -- Iron deficiency anemia  -- Former smoker (7/2024)  -- Osteoporosis    Cardiovascular history:  -- PAD  -- CAD: iatrogenic LM-LAD dissection (8/2024) during PCI of LCX, which was subsequently required stenting of LM, LM-LAD, and LAD. In this setting he suffered cardiogenic shock  -- Ischemic cardiomyopathy  -- HFrEF, Stage C, NYHA class I-II  -- History of ventricular tachycardia  -- Ascending aortic aneurysm (4.0 cm by CT)  -- CRT-D (implanted 2/2025)    Assessment: 74 y.o. WM with ischemic cardiomyopathy and HFrEF Stage C. Doing very well on current regimen of GDMT.    Plan:  -- Increase dose of metoprolol succinate to 50mg every day   -- Exercise 5 days a week if possible  -- RTC 2-3 months for further optimization of GDMT      Jesus Leblanc MD, MPH  Advanced Heart Failure and Transplant Cardiology  [facility]  [facility]

## 2025-04-08 NOTE — PROGRESS NOTES
Denies fatigue, chest pain, chest pressure, palpitations, shortness of breath, dyspnea on exertion, orthopnea, PND. No edema noted in BLE.   Denies headaches, dizziness, lightheadedness, and falls.    He had a defibrillator implanted in February.   He reports BLE edema that comes and goes.

## 2025-05-09 ENCOUNTER — APPOINTMENT (OUTPATIENT)
Dept: PHARMACY | Facility: HOSPITAL | Age: 75
End: 2025-05-09
Payer: MEDICARE

## 2025-05-09 DIAGNOSIS — I50.22 CHRONIC SYSTOLIC HEART FAILURE: ICD-10-CM

## 2025-05-09 DIAGNOSIS — I25.5 CARDIOMYOPATHY, ISCHEMIC: ICD-10-CM

## 2025-05-09 NOTE — ASSESSMENT & PLAN NOTE
Patient on 4 of 4 GDMT. Home BP averages 100s-low 120s/ high 70s-80s. Renal function and potassium level appropriate to continue current regimen.   Future considerations: Increase metoprolol succinate pending home HR readings.    Labs (02/05/2025): Scr-0.90 eGFR-90 K-4.5

## 2025-05-09 NOTE — Clinical Note
Nate Leblanc,  Today I spoke with Genna (wife) about Anurag's heart medications. Patient wife states he is tolerating his heart failure regimen well reporting adherence, no adverse effects and denies s/s of worsening heart failure. Patient takes weight and BP daily. Wife reports home BP averages 100s-low 120s/high 70-80s, unable to assess heart rate. Discussed with patient wife optimizing GDMT with future considerations to increase metoprolol succinate. Patient wife agreeable to keep log of HR for follow up visit in 1 month. Unfortunately at this time patient is not eligible for  PAP because he does live within a  [place]AP eligible county. Discussed with patient wife [employer] cost assistance application for Entresto, wife is not sure if they will be under the income limits- [employer] PAP application sent via email for her to complete. Thank you!

## 2025-05-09 NOTE — PROGRESS NOTES
"  Pharmacist Clinic: Cardiology Management    Anurag Waite is a 74 y.o. male was referred to Clinical Pharmacy Team for heart failure management.     Referring Provider: Jesus Leblanc MD *    THIS IS A NEW PATIENT APPOINTMENT. PATIENT WILL BE ESTABLISHING CARE WITH CLINICAL PHARMACY.    Appointment was completed by Genna (wife) who was reached at primary number.    Allergies Reviewed? Yes    Allergies[1]    Medical History[2]    Medications Ordered Prior to Encounter[3]      RELEVANT LAB RESULTS:  Lab Results   Component Value Date    BILITOT 0.6 11/06/2024    CALCIUM 8.9 02/05/2025    CO2 23 02/05/2025     02/05/2025    CREATININE 0.90 02/05/2025    GLUCOSE 89 02/05/2025    ALKPHOS 77 11/06/2024    K 4.5 02/05/2025    PROT 7.5 11/06/2024     02/05/2025    AST 29 11/06/2024    ALT 33 11/06/2024    BUN 16 02/05/2025    ANIONGAP 8 02/05/2025    MG 2.30 11/09/2024    PHOS 3.2 11/09/2024    ALBUMIN 3.0 (L) 11/09/2024     No results found for: \"TRIG\", \"CHOL\", \"LDLCALC\", \"HDL\"  No results found for: \"BMCBC\", \"CBCDIF\"     PHARMACEUTICAL ASSESSMENT:    MEDICATION RECONCILIATION    Was a medication reconciliation completed at this visit? Yes  Home Pharmacy Reviewed? Yes, describe: [facility]; [place], [place]    Removed:  -Vitamin D3  -Mulitvitamin    Drug Interactions? No    Medication Documentation Review Audit       Reviewed by Anuradha Rivera RN (Registered Nurse) on 04/08/25 at 1357      Medication Order Taking? Sig Documenting Provider Last Dose Status   aspirin 81 mg EC tablet [id number] Yes Take 1 tablet (81 mg) by mouth once daily. Jesus Leblanc MD MPH  Active   atorvastatin (Lipitor) 80 mg tablet [id number] Yes Take 1 tablet (80 mg) by mouth once daily. Jesus Leblanc MD MPH  Active   budesonide EC (Entocort EC) 3 mg 24 hr capsule [id number] Yes Take 3 capsules (9 mg) by mouth once daily.   Patient taking differently: Take 2 capsules (6 mg) by mouth once daily.    Joann Nunez, APRN-CNP  "  25 2359     Discontinued 25 1356   cholecalciferol (Vitamin D-3) 50 mcg (2,000 unit) capsule [id number] Yes Take 1 capsule (50 mcg) by mouth once daily. Historical Provider, MD  Active   clopidogrel (Plavix) 75 mg tablet [id number] Yes Take 1 tablet (75 mg) by mouth once daily. Jesus Leblanc MD MPH  Active   dapagliflozin propanediol (Farxiga) 10 mg [id number] Yes Take 1 tablet (10 mg) by mouth once daily. Jesus Leblanc MD MPH  Active   escitalopram (Lexapro) 20 mg tablet [id number] Yes Take 0.5 tablets (10 mg) by mouth once daily. Historical Provider, MD  Active   finasteride (Proscar) 5 mg tablet [id number] Yes Take 1 tablet (5 mg) by mouth once daily. Historical Provider, MD  Active   furosemide (Lasix) 20 mg tablet [id number] Yes Take 2 tablets (40 mg) by mouth see administration instructions. Take 2 tablets as needed for swelling/weight gain of 2+lbs in 1-2 days or as directed by Dr. Leblanc. Jesus Leblanc MD MPH  Active   loperamide (Imodium) 2 mg capsule [id number] Yes Take 1 capsule (2 mg) by mouth 4 times a day as needed for diarrhea. Joann Nunez, APRN-CNP  Active   medical cannabis [id number] Yes Take 1 each by mouth once daily at bedtime. Jared Chavez MD  Active   melatonin 5 mg tablet [id number] Yes Take 1 tablet (5 mg) by mouth once daily at bedtime. Historical Provider, MD  Active   metoprolol succinate XL (Toprol-XL) 25 mg 24 hr tablet [id number] Yes Take 1 tablet (25 mg) by mouth once daily. Do not crush or chew. Jesus Leblanc MD MPH  Active   multivitamin tablet [id number] Yes Take 1 tablet by mouth once daily. Historical Provider, MD  Active   sacubitriL-valsartan (Entresto) 24-26 mg tablet [id number] Yes Take 1 tablet by mouth 2 times a day. Jesus Leblanc MD MPH  Active   spironolactone (Aldactone) 25 mg tablet [id number] Yes Take 1.5 tablets (37.5 mg) by mouth once daily. Jesus Leblanc MD MPH  Active   traZODone (Desyrel) 50 mg tablet [id number]  Yes Take 1 tablet (50 mg) by mouth once daily at bedtime. Historical Provider, MD  Active                    DISEASE MANAGEMENT ASSESSMENT:     CHF ASSESSMENT     Symptom/Staging:  -Most recent ejection fraction: 30-35%  -NYHA Stage: C Class I-II    Results for orders placed during the hospital encounter of 01/30/25    Transesophageal Echo (ANGELICA)    Narrative  [facility], [address] [place] [address]  Tel [phone] and Fax [phone]    TRANSESOPHAGEAL ECHOCARDIOGRAM REPORT      Patient Name:       SVETA MONTGOMERY         Catrachita Physician:    [id number] Alexis Zapata MD  Study Date:         1/30/2025           Ordering Provider:    [id number] JAS HSU  MRN/PID:            [id number]            Fellow:               [id number] Nimo Barillas MD  Accession#:         [id number]        Nurse:                Claudia CHANGN  Date of Birth/Age:  1950 / 74      Sonographer:  brigido  Gender assigned at                     Additional Staff:     Rachana Hollins RN  Birth:  Height:             170.18 cm           Admit Date:  Weight:             62.60 kg            Admission Status:     Outpatient  BSA / BMI:          1.73 m2 / 21.61     Encounter#:           [id number]  kg/m2  Blood Pressure:     103/59 mmHg         Department Location:  [facility]  Non Invasive    Study Type:    TRANSESOPHAGEAL ECHO (ANGELICA)  Diagnosis/ICD: Nonrheumatic mitral (valve) insufficiency-I34.0; Ischemic  cardiomyopathy-I25.5; Acute combined systolic (congestive) and  diastolic (congestive) heart failure (CHF)-I50.41  Indication:    HF, Cardiomyopathy, MR  CPT Code:      ANGELICA Complete-65055; Doppler Limited-41754; Color Doppler-93503    Patient History:  Drug Allergies:    None  Pertinent History: Ascending aortic aneurysm, CAD, cardiomyopathy, PAD.      PHYSICIAN INTERPRETATION:  ANGELICA Details: Technically adequate omniplane transesophageal echocardiogram performed. Agitated saline contrast echo was performed to  assess for the presence of a patent foramen ovale.  ANGELICA Medication: The pharynx was anesthetized with lidocaine ointment, viscous lidocaine and topex spray. The patient was sedated using moderate sedation. 4 mg Midazolam and 50mcg Fentanyl IVP was used to sedate the patient for this exam.  ANGELICA Procedure: The probe was passed without difficulty. Complications encountered during procedure: Patient tolerated the procedure well without any apparent complications.  Left Ventricle: Left ventricular ejection fraction is moderately decreased, by visual estimate at 30-35%. The left ventricular cavity size was not assessed. Left ventricular diastolic filling was not assessed.  Left Atrium: The left atrial size was not assessed. A bubble study using agitated saline was performed. Bubble study is negative. There is a normal sized left atrial appendage, the left atrial appendage Doppler velocities are normal and there is no thrombus visualized in the left atrial appendage.  Right Ventricle: The right ventricle was not assessed. There is normal right ventricular global systolic function.  Right Atrium: The right atrial size was not assessed.  Aortic Valve: The aortic valve is trileaflet. There is mild to moderate aortic valve regurgitation.  Mitral Valve: The mitral valve is mildly thickened. The posterior mitral leaflet measured 1.1 cm in the ME LAX view. There is moderate mitral valve regurgitation. There is blunted systolic flow in the pulmonary veins. The regurgitant jet has a centrally directed and an eccentric laterally directed jet component. The mechanism appears mixed due to LV remodeling and posterior leaflet restriction. The MR vena contracta measured 0.54 cm. All 4 pulmonary veins assessed, flow pattern varied between normal and blunted. No systolic flow reversal.  Tricuspid Valve: The tricuspid valve is structurally normal. There is trace tricuspid regurgitation.  Pulmonic Valve: The pulmonic valve is structurally  normal. There is physiologic pulmonic valve regurgitation.  Pericardium: Trivial pericardial effusion.  Aorta: The aortic root is normal. There is mild dilatation of the ascending aorta. The aortic root is at the upper limits of normal size.      CONCLUSIONS:  1. Left ventricular ejection fraction is moderately decreased, by visual estimate at 30-35%.  2. There is normal right ventricular global systolic function.  3. Mild to moderate aortic valve regurgitation.  4. Moderate mitral valve regurgitation.  5. The regurgitant jet has a centrally directed and an eccentric laterally directed jet component. The mechanism appears mixed due to LV remodeling and posterior leaflet restriction. The MR vena contracta measured 0.54 cm. All 4 pulmonary veins assessed, flow pattern varied between normal and blunted. No systolic flow reversal.  6. The posterior mitral leaflet measured 1.1 cm in the ME LAX view.    QUANTITATIVE DATA SUMMARY:    LV SYSTOLIC FUNCTION:  Normal Ranges:  EF-Visual:      33 %  LV EF Reported: 33 %      [id number] Alexis Zapata MD  Electronically signed on 1/30/2025 at 7:17:37 PM        ** Final **      Guideline-Directed Medical Therapy:  -ARNI: Yes, describe: Entresto 24/26mg twice daily  -Beta Blocker: Yes, describe: Metoprolol succinate 50mg daily  -MRA: Yes, describe: Spironolactone 37.5mg daily  -SGLT2i: Yes, describe: Farxiga 10mg daily    Secondary Prevention:  -The ASCVD Risk score (Perfecto SANTACRUZ, et al., 2019) failed to calculate for the following reasons:    Risk score cannot be calculated because patient has a medical history suggesting prior/existing ASCVD   -Aspirin 81mg? yes and clopidogrel 75mg daily  -Statin?: Yes, describe: Atorvastatin 80mg daily  -HTN?: Yes, describe: controlled at last OV    CURRENT PHARMACOTHERAPY:   Entresto 24/26mg twice daily  Metoprolol succinate 50mg daily  Spironolactone 37.5mg daily  Farxiga 10mg daily  Furosemide 40mg daily PRN- patient takes 2-3x  weekly    Affordability:  PAP screen  Adherence/Compliance: reports adherence, wife sets up pill box  Adverse Effects: none reported    Monitoring Weights at Home: Yes; daily  Home Weight Recordings: 130-134lbs- denies acute weight fluctuation    Past In Office Weight Readings:   Wt Readings from Last 6 Encounters:   04/08/25 66.2 kg (146 lb)   02/24/25 61.2 kg (135 lb)   02/05/25 63.3 kg (139 lb 9 oz)   12/18/24 62.1 kg (137 lb)   12/13/24 60.8 kg (134 lb)   12/09/24 61.7 kg (136 lb)       Monitoring Blood Pressure at Home: Yes; daily  Home BP Recordings: 100s-low 120s/ high 70s-80s    Past In Office BP Readings:   BP Readings from Last 6 Encounters:   04/08/25 111/74   02/25/25 113/58   02/05/25 94/52   01/30/25 97/56   12/18/24 105/57   12/13/24 105/55       HEALTH MANAGEMENT    Maintaining fluid restriction (<2 L/day): N/A  Edema/swelling: Yes- 1-2x weekly; no concerns  Shortness of breath: No  Trouble sleeping/lying down: No  Dry/hacking cough: No  Recent Hospitalizations: No    EDUCATION/COUNSELING:   - Counseled patient on MOA, expectations, duration of therapy, contraindications, administration, and monitoring parameters  - Counseled patient on lifestyle modifications that can decrease your risk of having complications (smoking cessation, losing weight, daily weights, vaccines)  - Counseled patient on fluid intake and weight management. Recommended to not consume more than 2 liters of fliuids per day. If they have gained more than 2-3 pounds within a 24 hours period (or 5 pounds in a week), contact their cardiologist  - Answered all patient questions and concerns       DISCUSSION/NOTES:   Today was an initial visit conducted with wife to establish with clinical pharmacy. Patient medications and allergies were reviewed and updated.   Patient wife states he is tolerating his heart failure regimen well reporting adherence, no adverse effects and denies s/s of worsening heart failure. Patient takes weight and  BP daily. Wife reports home BP averages 100s-low 120s/high 70-80s, unable to assess heart rate. Discussed with patient wife optimizing GDMT with future considerations to increase metoprolol succinate. Patient wife agreeable to keep log of HR for follow up visit in 1 month.   Patient wife reports paying ~$300/30 day supply for both medications. Unfortunately at this time patient is not eligible for  PAP because he does live within a  PAP eligible county. Discussed with patient wife [employer] PAP for Entresto, wife not sure if they will be under the income limits, [employer] PAP application sent via email. Patient wife understands to complete application and turn in to cardiologist office. Unfortunately patient and wife are over income limit for either Farxiga/Jardiance  PAP.    ASSESSMENT:  Unfortunately, at this time, Anurag Waite is ineligible to receive financial assistance through  Patient Assistance Program because patient does not live within  PAP eligible county.    Patient was notified of ineligibility and given the following options: [employer] PAP application    Referring provider will be notified of denial.     Magy Monroe, PharmD    Assessment/Plan   Problem List Items Addressed This Visit       Cardiomyopathy, ischemic    Patient on 4 of 4 GDMT. Home BP averages 100s-low 120s/ high 70s-80s. Renal function and potassium level appropriate to continue current regimen.   Future considerations: Increase metoprolol succinate pending home HR readings.    Labs (02/05/2025): Scr-0.90 eGFR-90 K-4.5         Relevant Orders    Referral to Clinical Pharmacy     Other Visit Diagnoses         Chronic systolic heart failure        Relevant Orders    Referral to Clinical Pharmacy              RECOMMENDATIONS/PLAN:    CONTINUE  Entresto 24/26mg twice daily  Metoprolol succinate 50mg daily  Spironolactone 37.5mg daily  Farxiga 10mg daily  Furosemide 40mg daily    Last Appnt with Referring Provider:  04/08/2025  Next Appnt with Referring Provider: 07/03/2025  Clinical Pharmacist follow up: 1 month  VAF/Application Expiration: No  Type of Encounter: Shital Monroe, [place]    Verbal consent to manage patient's drug therapy was obtained from an individual authorized to act on behalf of a patient. They were informed they may decline to participate or withdraw from participation in pharmacy services at any time.    Continue all meds under the continuation of care with the referring provider and clinical pharmacy team.            [1] No Known Allergies  [2]   Past Medical History:  Diagnosis Date    BPH (benign prostatic hyperplasia)     CHF (congestive heart failure)     Coronary artery disease     HLD (hyperlipidemia)     Hypertension     Ischemic cardiomyopathy     MI (myocardial infarction) (Multi)     PAD (peripheral artery disease) (CMS-HCC)     Ulcerative colitis     VT (ventricular tachycardia) (Multi)    [3]   Current Outpatient Medications on File Prior to Visit   Medication Sig Dispense Refill    aspirin 81 mg EC tablet Take 1 tablet (81 mg) by mouth once daily. 90 tablet 3    atorvastatin (Lipitor) 80 mg tablet Take 1 tablet (80 mg) by mouth once daily. 90 tablet 3    budesonide EC (Entocort EC) 3 mg 24 hr capsule Take 3 capsules (9 mg) by mouth once daily. (Patient taking differently: Take 2 capsules (6 mg) by mouth once daily.) 90 capsule 0    clopidogrel (Plavix) 75 mg tablet Take 1 tablet (75 mg) by mouth once daily. 90 tablet 3    dapagliflozin propanediol (Farxiga) 10 mg Take 1 tablet (10 mg) by mouth once daily. 90 tablet 3    escitalopram (Lexapro) 20 mg tablet Take 1 tablet (20 mg) by mouth once daily.      finasteride (Proscar) 5 mg tablet Take 1 tablet (5 mg) by mouth once daily.      furosemide (Lasix) 20 mg tablet Take 2 tablets (40 mg) by mouth see administration instructions. Take 2 tablets as needed for swelling/weight gain of 2+lbs in 1-2 days or as directed by Dr. Leblanc.  100 tablet 6    loperamide (Imodium) 2 mg capsule Take 1 capsule (2 mg) by mouth 4 times a day as needed for diarrhea. 30 capsule 0    medical cannabis Take 1 each by mouth once daily at bedtime.      melatonin 5 mg tablet Take 1 tablet (5 mg) by mouth once daily at bedtime.      metoprolol succinate XL (Toprol-XL) 50 mg 24 hr tablet Take 1 tablet (50 mg) by mouth once daily. Do not crush or chew. 90 tablet 3    sacubitriL-valsartan (Entresto) 24-26 mg tablet Take 1 tablet by mouth 2 times a day. 180 tablet 3    spironolactone (Aldactone) 25 mg tablet Take 1.5 tablets (37.5 mg) by mouth once daily. 135 tablet 3    traZODone (Desyrel) 50 mg tablet Take 1 tablet (50 mg) by mouth once daily at bedtime.      cholecalciferol (Vitamin D-3) 50 mcg (2,000 unit) capsule Take 1 capsule (50 mcg) by mouth once daily.      multivitamin tablet Take 1 tablet by mouth once daily.       No current facility-administered medications on file prior to visit.

## 2025-05-11 NOTE — PROGRESS NOTES
Daily Call Note:     Daily call with the patient and his wife.  He states he is doing okay.  He missed last [facility], so scheduled him for 12/6 @ 1800.  Today's /55, HR 59.  Pt states his weight today was 134 lbs.  He has been taking Torsemide 40 mg due to increased leg swelling, weight gain, and SOB. Pt wanted to know if he was okay to stop taking it as he has taken it the last 2 days and all symptoms have resolved.  Sent message to provider and will call the pt back.  Torsemide is prescribed as PRN for weight gain of 3 pounds in a day or 5 pounds in a week.  Educated pt on the importance of adhering to these guidelines because he was increasingly gaining weight and wasn't taking the medication.  Pt has cardio appt on 12/6.       Update:  called pt back and let him know provider states it's okay to stop the Torsemide until he sees his Cardiologists.      Pt Education:   Barriers:   Topics for Daily Review:   Pt demonstrates clear understanding:     Daily Weight:  There were no vitals filed for this visit.   Last 3 Weights:  Wt Readings from Last 7 Encounters:   12/02/24 64.9 kg (143 lb)   11/29/24 63 kg (139 lb)   11/25/24 64 kg (141 lb)   11/23/24 62.1 kg (137 lb)   11/22/24 61.2 kg (135 lb)   11/21/24 62.6 kg (138 lb)   11/20/24 61.2 kg (135 lb)       Masimo Device:    Masimo Clinical Impression:     Virtual Visits--Scheduled (Most Recent Date at Top)  Follow up Appointments  Recent Visits  No visits were found meeting these conditions.  Showing recent visits within past 30 days and meeting all other requirements  Future Appointments  No visits were found meeting these conditions.  Showing future appointments within next 90 days and meeting all other requirements       Frequency of RN Calls & Virtual Visits per Team Agreement:     Medication issues Addressed (what was done):     Follow up appointments scheduled by [facility] Staff:   Referrals made by [facility] staff:               For information on Fall & Injury Prevention, visit: [email or web address] OR  [email or web address] OR  https://www.cdc.gov/steadi/patient.html

## 2025-06-16 ENCOUNTER — HOSPITAL ENCOUNTER (OUTPATIENT)
Dept: CARDIOLOGY | Facility: CLINIC | Age: 75
Discharge: HOME | End: 2025-06-16
Payer: MEDICARE

## 2025-06-16 DIAGNOSIS — I44.2 CHB (COMPLETE HEART BLOCK): ICD-10-CM

## 2025-06-16 PROCEDURE — 93296 REM INTERROG EVL PM/IDS: CPT

## 2025-06-16 PROCEDURE — 93295 DEV INTERROG REMOTE 1/2/MLT: CPT | Performed by: INTERNAL MEDICINE

## 2025-06-20 ENCOUNTER — APPOINTMENT (OUTPATIENT)
Dept: PHARMACY | Facility: HOSPITAL | Age: 75
End: 2025-06-20
Payer: MEDICARE

## 2025-06-20 DIAGNOSIS — I50.22 CHRONIC SYSTOLIC HEART FAILURE: ICD-10-CM

## 2025-06-20 DIAGNOSIS — I25.5 CARDIOMYOPATHY, ISCHEMIC: ICD-10-CM

## 2025-06-20 NOTE — Clinical Note
Nate Leblanc,  Today I spoke with Genna (wife) about Anurag's heart medications. Genna reports his recent refill of Entresto and Farxiga at the pharmacy was no cost. Patient wife states he is tolerating his heart failure regimen well reporting adherence, no adverse effects and denies s/s of worsening heart failure. Patient takes weight and BP daily. Wife reports home BP averages mid-high 90s-120/70. Plan to continue current regimen due to labile BP. Thank you!

## 2025-06-20 NOTE — PROGRESS NOTES
Pharmacist Clinic: Cardiology Management    Anurag Waite is a 74 y.o. male was referred to Clinical Pharmacy Team for heart failure management.     Referring Provider: Jesus Leblanc MD *    THIS IS A FOLLOW UP PATIENT APPOINTMENT. AT LAST VISIT ON 05/09/2025 WITH PHARMACIST (Magy Monroe).    Appointment was completed by Genna (wife) who was reached at primary number.    REVIEW OF LAST APPT  Initial visit conducted with wife to establish with clinical pharmacy. Patient medications and allergies were reviewed and updated.   Patient wife states he is tolerating his heart failure regimen well reporting adherence, no adverse effects and denies s/s of worsening heart failure. Patient takes weight and BP daily. Wife reports home BP averages 100s-low 120s/high 70-80s, unable to assess heart rate. Discussed with patient wife optimizing GDMT with future considerations to increase metoprolol succinate. Patient wife agreeable to keep log of HR for follow up visit in 1 month.   Patient wife reports paying ~$300/30 day supply for both medications. Unfortunately at this time patient is not eligible for  PAP because he does live within a  [place]AP eligible county. Discussed with patient wife [employer] PAP for Entresto, wife not sure if they will be under the income limits, [employer] PAP application sent via email. Patient wife understands to complete application and turn in to cardiologist office. Unfortunately patient and wife are over income limit for either Farxiga/Jardiance  PAP.    Allergies Reviewed? No    Allergies[1]    Medical History[2]    Medications Ordered Prior to Encounter[3]      RELEVANT LAB RESULTS:  Lab Results   Component Value Date    BILITOT 0.6 11/06/2024    CALCIUM 8.9 02/05/2025    CO2 23 02/05/2025     02/05/2025    CREATININE 0.90 02/05/2025    GLUCOSE 89 02/05/2025    ALKPHOS 77 11/06/2024    K 4.5 02/05/2025    PROT 7.5 11/06/2024     02/05/2025    AST 29 11/06/2024    ALT  "33 2024    BUN 16 2025    ANIONGAP 8 2025    MG 2.30 2024    PHOS 3.2 2024    ALBUMIN 3.0 (L) 2024     No results found for: \"TRIG\", \"CHOL\", \"LDLCALC\", \"HDL\"  No results found for: \"BMCBC\", \"CBCDIF\"     PHARMACEUTICAL ASSESSMENT:    MEDICATION RECONCILIATION    Was a medication reconciliation completed at this visit? No    Drug Interactions? No    Medication Documentation Review Audit       Reviewed by Anuradha Rivera RN (Registered Nurse) on 25 at 1357      Medication Order Taking? Sig Documenting Provider Last Dose Status   aspirin 81 mg EC tablet [id number] Yes Take 1 tablet (81 mg) by mouth once daily. Jesus Leblanc MD MPH  Active   atorvastatin (Lipitor) 80 mg tablet [id number] Yes Take 1 tablet (80 mg) by mouth once daily. Jesus Leblanc MD MPH  Active   budesonide EC (Entocort EC) 3 mg 24 hr capsule [id number] Yes Take 3 capsules (9 mg) by mouth once daily.   Patient taking differently: Take 2 capsules (6 mg) by mouth once daily.    Joann Nunez, APRN-CNP   25 2359     Discontinued 25 1356   cholecalciferol (Vitamin D-3) 50 mcg (2,000 unit) capsule [id number] Yes Take 1 capsule (50 mcg) by mouth once daily. Historical Provider, MD  Active   clopidogrel (Plavix) 75 mg tablet [id number] Yes Take 1 tablet (75 mg) by mouth once daily. Jesus Leblanc MD MPH  Active   dapagliflozin propanediol (Farxiga) 10 mg [id number] Yes Take 1 tablet (10 mg) by mouth once daily. Jesus Leblanc MD MPH  Active   escitalopram (Lexapro) 20 mg tablet [id number] Yes Take 0.5 tablets (10 mg) by mouth once daily. Historical Provider, MD  Active   finasteride (Proscar) 5 mg tablet [id number] Yes Take 1 tablet (5 mg) by mouth once daily. Historical Provider, MD  Active   furosemide (Lasix) 20 mg tablet [id number] Yes Take 2 tablets (40 mg) by mouth see administration instructions. Take 2 tablets as needed for swelling/weight gain of 2+lbs in 1-2 days or as directed " by Dr. Leblanc. Jesus Leblanc MD MPH  Active   loperamide (Imodium) 2 mg capsule [id number] Yes Take 1 capsule (2 mg) by mouth 4 times a day as needed for diarrhea. Joann Nunez, APRN-CNP  Active   medical cannabis [id number] Yes Take 1 each by mouth once daily at bedtime. Historical Provider, MD  Active   melatonin 5 mg tablet [id number] Yes Take 1 tablet (5 mg) by mouth once daily at bedtime. Historical Provider, MD  Active   metoprolol succinate XL (Toprol-XL) 25 mg 24 hr tablet [id number] Yes Take 1 tablet (25 mg) by mouth once daily. Do not crush or chew. Jesus Leblanc MD MPH  Active   multivitamin tablet [id number] Yes Take 1 tablet by mouth once daily. Historical Provider, MD  Active   sacubitriL-valsartan (Entresto) 24-26 mg tablet [id number] Yes Take 1 tablet by mouth 2 times a day. Jesus Leblanc MD MPH  Active   spironolactone (Aldactone) 25 mg tablet [id number] Yes Take 1.5 tablets (37.5 mg) by mouth once daily. Jesus Leblanc MD MPH  Active   traZODone (Desyrel) 50 mg tablet [id number] Yes Take 1 tablet (50 mg) by mouth once daily at bedtime. Historical Provider, MD  Active                    DISEASE MANAGEMENT ASSESSMENT:     CHF ASSESSMENT     Symptom/Staging:  -Most recent ejection fraction: 30-35%  -NYHA Stage: C Class I-II    Results for orders placed during the hospital encounter of 01/30/25    Transesophageal Echo (ANGELICA)    [facility], [address] [place] [address]  Tel [phone] and Fax [phone]    TRANSESOPHAGEAL ECHOCARDIOGRAM REPORT      Patient Name:       SVETA Domínguez Physician:    [id number] Alexis Zapata MD  Study Date:         1/30/2025           Ordering Provider:    [id number] JESUS LEBLANC  MRN/PID:            [id number]            Fellow:               [id number] Nimo Barillas MD  Accession#:         [id number]        Nurse:                Claudia CRUZ  Date of Birth/Age:  1950 / 74       Sonographer:  years  Gender assigned at  M                   Additional Staff:     Rachana Hollins RN  Birth:  Height:             170.18 cm           Admit Date:  Weight:             62.60 kg            Admission Status:     Outpatient  BSA / BMI:          1.73 m2 / 21.61     Encounter#:           [id number]  kg/m2  Blood Pressure:     103/59 mmHg         Department Location:  [facility]  Non Invasive    Study Type:    TRANSESOPHAGEAL ECHO (ANGELICA)  Diagnosis/ICD: Nonrheumatic mitral (valve) insufficiency-I34.0; Ischemic  cardiomyopathy-I25.5; Acute combined systolic (congestive) and  diastolic (congestive) heart failure (CHF)-I50.41  Indication:    HF, Cardiomyopathy, MR  CPT Code:      ANGELICA Complete-74449; Doppler Limited-31354; Color Doppler-98805    Patient History:  Drug Allergies:    None  Pertinent History: Ascending aortic aneurysm, CAD, cardiomyopathy, PAD.      PHYSICIAN INTERPRETATION:  ANGELICA Details: Technically adequate omniplane transesophageal echocardiogram performed. Agitated saline contrast echo was performed to assess for the presence of a patent foramen ovale.  ANGELICA Medication: The pharynx was anesthetized with lidocaine ointment, viscous lidocaine and topex spray. The patient was sedated using moderate sedation. 4 mg Midazolam and 50mcg Fentanyl IVP was used to sedate the patient for this exam.  ANGELICA Procedure: The probe was passed without difficulty. Complications encountered during procedure: Patient tolerated the procedure well without any apparent complications.  Left Ventricle: Left ventricular ejection fraction is moderately decreased, by visual estimate at 30-35%. The left ventricular cavity size was not assessed. Left ventricular diastolic filling was not assessed.  Left Atrium: The left atrial size was not assessed. A bubble study using agitated saline was performed. Bubble study is negative. There is a normal sized left atrial appendage, the left atrial appendage Doppler velocities are  normal and there is no thrombus visualized in the left atrial appendage.  Right Ventricle: The right ventricle was not assessed. There is normal right ventricular global systolic function.  Right Atrium: The right atrial size was not assessed.  Aortic Valve: The aortic valve is trileaflet. There is mild to moderate aortic valve regurgitation.  Mitral Valve: The mitral valve is mildly thickened. The posterior mitral leaflet measured 1.1 cm in the ME LAX view. There is moderate mitral valve regurgitation. There is blunted systolic flow in the pulmonary veins. The regurgitant jet has a centrally directed and an eccentric laterally directed jet component. The mechanism appears mixed due to LV remodeling and posterior leaflet restriction. The MR vena contracta measured 0.54 cm. All 4 pulmonary veins assessed, flow pattern varied between normal and blunted. No systolic flow reversal.  Tricuspid Valve: The tricuspid valve is structurally normal. There is trace tricuspid regurgitation.  Pulmonic Valve: The pulmonic valve is structurally normal. There is physiologic pulmonic valve regurgitation.  Pericardium: Trivial pericardial effusion.  Aorta: The aortic root is normal. There is mild dilatation of the ascending aorta. The aortic root is at the upper limits of normal size.      CONCLUSIONS:  1. Left ventricular ejection fraction is moderately decreased, by visual estimate at 30-35%.  2. There is normal right ventricular global systolic function.  3. Mild to moderate aortic valve regurgitation.  4. Moderate mitral valve regurgitation.  5. The regurgitant jet has a centrally directed and an eccentric laterally directed jet component. The mechanism appears mixed due to LV remodeling and posterior leaflet restriction. The MR vena contracta measured 0.54 cm. All 4 pulmonary veins assessed, flow pattern varied between normal and blunted. No systolic flow reversal.  6. The posterior mitral leaflet measured 1.1 cm in the ME LAX  view.    QUANTITATIVE DATA SUMMARY:    LV SYSTOLIC FUNCTION:  Normal Ranges:  EF-Visual:      33 %  LV EF Reported: 33 %      [id number] Alexis Zapata MD  Electronically signed on 1/30/2025 at 7:17:37 PM        ** Final **      Guideline-Directed Medical Therapy:  -ARNI: Yes, describe: Entresto 24/26mg twice daily  -Beta Blocker: Yes, describe: Metoprolol succinate 50mg daily  -MRA: Yes, describe: Spironolactone 37.5mg daily  -SGLT2i: Yes, describe: Farxiga 10mg daily    Secondary Prevention:  -The ASCVD Risk score (Perfecto SANTACRUZ, et al., 2019) failed to calculate for the following reasons:    Risk score cannot be calculated because patient has a medical history suggesting prior/existing ASCVD   -Aspirin 81mg? yes and clopidogrel 75mg daily  -Statin?: Yes, describe: Atorvastatin 80mg daily  -HTN?: Yes, describe: controlled at last OV    CURRENT PHARMACOTHERAPY:   Entresto 24/26mg twice daily  Metoprolol succinate 50mg daily  Spironolactone 37.5mg daily  Farxiga 10mg daily  Furosemide 40mg daily PRN    Affordability: no issues, Farxiga and Entresto no cost  Adherence/Compliance: reports adherence, wife sets up pill box  Adverse Effects: none reported    Monitoring Weights at Home: Yes; daily  Home Weight Recordings: 130-134lbs- denies acute weight fluctuation    Past In Office Weight Readings:   Wt Readings from Last 6 Encounters:   04/08/25 66.2 kg (146 lb)   02/24/25 61.2 kg (135 lb)   02/05/25 63.3 kg (139 lb 9 oz)   12/18/24 62.1 kg (137 lb)   12/13/24 60.8 kg (134 lb)   12/09/24 61.7 kg (136 lb)       Monitoring Blood Pressure at Home: Yes; daily  Home BP Recordings: mid to high 90s-120/70s    Past In Office BP Readings:   BP Readings from Last 6 Encounters:   04/08/25 111/74   02/25/25 113/58   02/05/25 94/52   01/30/25 97/56   12/18/24 105/57   12/13/24 105/55       HEALTH MANAGEMENT    Maintaining fluid restriction (<2 L/day): N/A  Edema/swelling: Yes- 1-2x weekly; no concerns  Shortness of breath: No  Trouble  sleeping/lying down: No  Dry/hacking cough: No  Recent Hospitalizations: No    EDUCATION/COUNSELING:   - Counseled patient on MOA, expectations, duration of therapy, contraindications, administration, and monitoring parameters  - Counseled patient on lifestyle modifications that can decrease your risk of having complications (smoking cessation, losing weight, daily weights, vaccines)  - Counseled patient on fluid intake and weight management. Recommended to not consume more than 2 liters of fliuids per day. If they have gained more than 2-3 pounds within a 24 hours period (or 5 pounds in a week), contact their cardiologist  - Answered all patient questions and concerns       DISCUSSION/NOTES:   Patient wife states he is tolerating his heart failure regimen well reporting adherence, no adverse effects and denies s/s of worsening heart failure. Patient takes weight and BP daily. Wife reports home BP averages mid-high 90s-120/70. Plan to continue current regimen due to labile BP.   Patient wife reports recent refill at pharmacy was no cost for Entresto and Farxiga.     ASSESSMENT:    Assessment/Plan   Problem List Items Addressed This Visit       Cardiomyopathy, ischemic    Patient on 4 of 4 GDMT. Home BP averages mid-high 90s-120/70. BP labile, will continue current regimen. Renal function and potassium appropriate for current dosages of medication.    Labs (02/05/2025): Scr-0.90 eGFR-90 K-4.5         Relevant Orders    Referral to Clinical Pharmacy     Other Visit Diagnoses         Chronic systolic heart failure        Relevant Orders    Referral to Clinical Pharmacy          RECOMMENDATIONS/PLAN:    CONTINUE  Entresto 24/26mg twice daily  Metoprolol succinate 50mg daily  Spironolactone 37.5mg daily  Farxiga 10mg daily  Furosemide 40mg daily PRN    Last Appnt with Referring Provider: 04/08/2025  Next Appnt with Referring Provider: 07/03/2025  Clinical Pharmacist follow up: 6 months- assess  affordability  VAF/Application Expiration: No  Type of Encounter: Shital Monroe, PharmD    Verbal consent to manage patient's drug therapy was obtained from an individual authorized to act on behalf of a patient. They were informed they may decline to participate or withdraw from participation in pharmacy services at any time.    Continue all meds under the continuation of care with the referring provider and clinical pharmacy team.            [1] No Known Allergies  [2]   Past Medical History:  Diagnosis Date    BPH (benign prostatic hyperplasia)     CHF (congestive heart failure)     Coronary artery disease     HLD (hyperlipidemia)     Hypertension     Ischemic cardiomyopathy     MI (myocardial infarction) (Multi)     PAD (peripheral artery disease)     Ulcerative colitis     VT (ventricular tachycardia) (Multi)    [3]   Current Outpatient Medications on File Prior to Visit   Medication Sig Dispense Refill    aspirin 81 mg EC tablet Take 1 tablet (81 mg) by mouth once daily. 90 tablet 3    atorvastatin (Lipitor) 80 mg tablet Take 1 tablet (80 mg) by mouth once daily. 90 tablet 3    budesonide EC (Entocort EC) 3 mg 24 hr capsule Take 3 capsules (9 mg) by mouth once daily. (Patient taking differently: Take 2 capsules (6 mg) by mouth once daily.) 90 capsule 0    cholecalciferol (Vitamin D-3) 50 mcg (2,000 unit) capsule Take 1 capsule (50 mcg) by mouth once daily.      clopidogrel (Plavix) 75 mg tablet Take 1 tablet (75 mg) by mouth once daily. 90 tablet 3    dapagliflozin propanediol (Farxiga) 10 mg Take 1 tablet (10 mg) by mouth once daily. 90 tablet 3    escitalopram (Lexapro) 20 mg tablet Take 1 tablet (20 mg) by mouth once daily.      finasteride (Proscar) 5 mg tablet Take 1 tablet (5 mg) by mouth once daily.      furosemide (Lasix) 20 mg tablet Take 2 tablets (40 mg) by mouth see administration instructions. Take 2 tablets as needed for swelling/weight gain of 2+lbs in 1-2 days or as directed by  Dr. Leblanc. 100 tablet 6    loperamide (Imodium) 2 mg capsule Take 1 capsule (2 mg) by mouth 4 times a day as needed for diarrhea. 30 capsule 0    medical cannabis Take 1 each by mouth once daily at bedtime.      melatonin 5 mg tablet Take 1 tablet (5 mg) by mouth once daily at bedtime.      metoprolol succinate XL (Toprol-XL) 50 mg 24 hr tablet Take 1 tablet (50 mg) by mouth once daily. Do not crush or chew. 90 tablet 3    multivitamin tablet Take 1 tablet by mouth once daily.      sacubitriL-valsartan (Entresto) 24-26 mg tablet Take 1 tablet by mouth 2 times a day. 180 tablet 3    spironolactone (Aldactone) 25 mg tablet Take 1.5 tablets (37.5 mg) by mouth once daily. 135 tablet 3    traZODone (Desyrel) 50 mg tablet Take 1 tablet (50 mg) by mouth once daily at bedtime.       No current facility-administered medications on file prior to visit.

## 2025-06-20 NOTE — ASSESSMENT & PLAN NOTE
Patient on 4 of 4 GDMT. Home BP averages mid-high 90s-120/70. BP labile, will continue current regimen. Renal function and potassium appropriate for current dosages of medication.    Labs (02/05/2025): Scr-0.90 eGFR-90 K-4.5

## 2025-07-03 ENCOUNTER — APPOINTMENT (OUTPATIENT)
Dept: CARDIOLOGY | Facility: CLINIC | Age: 75
End: 2025-07-03
Payer: MEDICARE

## 2025-07-03 VITALS
HEART RATE: 71 BPM | DIASTOLIC BLOOD PRESSURE: 65 MMHG | HEIGHT: 67 IN | BODY MASS INDEX: 22.57 KG/M2 | TEMPERATURE: 97.7 F | WEIGHT: 143.8 LBS | SYSTOLIC BLOOD PRESSURE: 98 MMHG

## 2025-07-03 DIAGNOSIS — I50.22 CHRONIC SYSTOLIC HEART FAILURE: Primary | ICD-10-CM

## 2025-07-03 DIAGNOSIS — I71.21 ASCENDING AORTIC ANEURYSM, UNSPECIFIED WHETHER RUPTURED: ICD-10-CM

## 2025-07-03 DIAGNOSIS — I25.5 CARDIOMYOPATHY, ISCHEMIC: ICD-10-CM

## 2025-07-03 PROCEDURE — 99214 OFFICE O/P EST MOD 30 MIN: CPT | Performed by: INTERNAL MEDICINE

## 2025-07-03 PROCEDURE — 1159F MED LIST DOCD IN RCRD: CPT | Performed by: INTERNAL MEDICINE

## 2025-07-03 PROCEDURE — 1036F TOBACCO NON-USER: CPT | Performed by: INTERNAL MEDICINE

## 2025-07-03 PROCEDURE — 3008F BODY MASS INDEX DOCD: CPT | Performed by: INTERNAL MEDICINE

## 2025-07-03 NOTE — Clinical Note
Bianca -- This is the VIP patient I texted you about. Can you see him for routine assessment of ascending aortic aneurysm? He had imaging at another hospital, and will need a plan for longitudinal screening.  faith

## 2025-07-03 NOTE — PROGRESS NOTES
"    Heart Failure Cardiology    Anurag Waite is a 74 y.o. male from [place], [place], previously referred by Dr. Abel Gutierrez for consultation regarding management of heart failure.     He is doing fantastic. NYHA class I, asymptomatic and euvolemic.    Studies:    Evaluations:  Left heart cath - patent Left sided stents; No significant disease  Right heart cath - Co/CI= 4.5/2.6; RA mean 6; RV 55/3; PA 56/19, PCWP with large V wave- 19/43(mean 28)  Echocardiogram - LVEF 29%, LVIDD 5.69cm, moderate MR, moderate TR    Echocardiogram 11/25/2024  1. Left ventricular ejection fraction is severely decreased, by visual estimate at 25%.   2. There is global hypokinesis of the left ventricle with minor regional variations.   3. Spectral Doppler shows a Grade III (restrictive pattern) of left ventricular diastolic filling with an elevated left atrial pressure.   4. Left ventricular cavity size is mildly dilated.   5. There is moderate eccentric left ventricular hypertrophy.   6. There is normal right ventricular global systolic function.   7. The left atrium is severely dilated.   8. Moderate mitral valve regurgitation.   9. Moderately elevated right ventricular systolic pressure.  10. Mild to moderate aortic valve regurgitation.    Exam: BP 98/65 (BP Location: Right arm, Patient Position: Sitting, BP Cuff Size: Adult)   Pulse 71   Temp 36.5 °C (97.7 °F) (Oral)   Ht 1.702 m (5' 7\")   Wt 65.2 kg (143 lb 12.8 oz)   BMI 22.52 kg/m²   No JVD  RRR  No LE edema  Well appearing    Current Outpatient Medications   Medication Instructions    aspirin 81 mg, oral, Daily    atorvastatin (LIPITOR) 80 mg, oral, Daily    budesonide EC (ENTOCORT EC) 9 mg, oral, Daily    clopidogrel (PLAVIX) 75 mg, oral, Daily    dapagliflozin propanediol (FARXIGA) 10 mg, oral, Daily    escitalopram (LEXAPRO) 20 mg, Daily    finasteride (PROSCAR) 5 mg, Daily    furosemide (LASIX) 40 mg, oral, See admin instructions, Take 2 tablets as needed for swelling/weight " gain of 2+lbs in 1-2 days or as directed by Dr. Leblanc.    loperamide (IMODIUM) 2 mg, oral, 4 times daily PRN    medical cannabis 1 each, Nightly    melatonin 5 mg, Nightly    metoprolol succinate XL (TOPROL-XL) 50 mg, oral, Daily, Do not crush or chew.    sacubitriL-valsartan (Entresto) 24-26 mg tablet 1 tablet, oral, 2 times daily    spironolactone (ALDACTONE) 37.5 mg, oral, Daily    traZODone (DESYREL) 50 mg, Nightly     Past medical history (non-cardiac):  -- Iron deficiency anemia  -- Former smoker (7/2024)  -- Osteoporosis    Cardiovascular history:  -- PAD  -- CAD: iatrogenic LM-LAD dissection (8/2024) during PCI of LCX, which was subsequently required stenting of LM, LM-LAD, and LAD. In this setting he suffered cardiogenic shock  -- Ischemic cardiomyopathy  -- HFrEF, Stage C, NYHA class I-II  -- History of ventricular tachycardia  -- Ascending aortic aneurysm (4.0 cm by CT)  -- CRT-D (implanted 2/2025)    Assessment: 74 y.o. WM with ischemic cardiomyopathy and HFrEF Stage C. Doing very well on current regimen of GDMT.    Plan:  -- Refer to [facility] for further evaluation and imaging of aortic aneurysm  -- I will review whether we can downgrade from DAPT to clopidogrel alone at the 1-year post PCI time point  -- Stay on same GDMT regimen for HFrEF    Jesus Leblanc MD, MPH  Advanced Heart Failure and Transplant Cardiology  [facility]

## 2025-07-03 NOTE — PATIENT INSTRUCTIONS
To reach Dr. Leblanc's office please call [phone] ([facility]). Fax [phone]. Call [phone] to schedule an appointment. You may also contact the HF RNs at [email or web address]    Thank you for coming to your appointment today. If you have any questions or need cardiac medication refills, please call the Heart Failure Office at [phone] option [phone].     No medication changes today  Dr. Leblanc is looking into your Aspirin and Plavix regimen  We will refer you to the [facility]. Please call [phone] to schedule with Dr. Rapp  Follow up with Dr. Leblanc in one year

## 2025-07-08 ENCOUNTER — APPOINTMENT (OUTPATIENT)
Dept: CARDIOLOGY | Facility: CLINIC | Age: 75
End: 2025-07-08
Payer: MEDICARE

## 2025-07-22 PROBLEM — Q25.40 ABNORMALITY OF ASCENDING AORTA (HHS-HCC): Status: ACTIVE | Noted: 2025-07-22

## 2025-07-22 PROBLEM — I50.9 CONGESTIVE HEART FAILURE: Status: RESOLVED | Noted: 2025-07-22 | Resolved: 2025-07-22

## 2025-07-22 PROBLEM — K40.91 RECURRENT INGUINAL HERNIA WITHOUT OBSTRUCTION OR GANGRENE: Status: RESOLVED | Noted: 2025-07-22 | Resolved: 2025-07-22

## 2025-07-22 PROBLEM — M81.0 OSTEOPOROSIS: Status: RESOLVED | Noted: 2025-07-22 | Resolved: 2025-07-22

## 2025-07-22 PROBLEM — I25.10 CORONARY ARTERY DISEASE: Status: ACTIVE | Noted: 2024-08-08

## 2025-07-22 PROBLEM — K29.70 GASTRITIS: Status: RESOLVED | Noted: 2025-07-22 | Resolved: 2025-07-22

## 2025-07-22 PROBLEM — R57.0 CARDIOGENIC SHOCK (MULTI): Status: RESOLVED | Noted: 2025-07-22 | Resolved: 2025-07-22

## 2025-07-22 PROBLEM — K52.9 CHRONIC DIARRHEA: Status: ACTIVE | Noted: 2024-11-07

## 2025-07-22 PROBLEM — I70.201 UNSPECIFIED ATHEROSCLEROSIS OF NATIVE ARTERIES OF EXTREMITIES, RIGHT LEG: Status: RESOLVED | Noted: 2025-07-22 | Resolved: 2025-07-22

## 2025-07-22 PROBLEM — I35.1 NONRHEUMATIC AORTIC (VALVE) INSUFFICIENCY: Status: RESOLVED | Noted: 2024-03-06 | Resolved: 2025-07-22

## 2025-07-22 PROBLEM — K42.9 UMBILICAL HERNIA: Status: RESOLVED | Noted: 2025-07-22 | Resolved: 2025-07-22

## 2025-07-22 PROBLEM — I47.20 VENTRICULAR TACHYCARDIA, UNSPECIFIED (MULTI): Status: ACTIVE | Noted: 2024-08-08

## 2025-07-22 PROBLEM — G47.00 INSOMNIA: Status: RESOLVED | Noted: 2025-07-22 | Resolved: 2025-07-22

## 2025-07-22 PROBLEM — K52.831 COLLAGENOUS COLITIS: Status: RESOLVED | Noted: 2025-07-22 | Resolved: 2025-07-22

## 2025-07-22 PROBLEM — K43.9 HERNIA OF ANTERIOR ABDOMINAL WALL: Status: RESOLVED | Noted: 2025-07-22 | Resolved: 2025-07-22

## 2025-07-22 PROBLEM — R93.89 ABNORMAL CXR (CHEST X-RAY): Status: RESOLVED | Noted: 2025-07-22 | Resolved: 2025-07-22

## 2025-07-22 PROBLEM — R91.1 SOLITARY PULMONARY NODULE: Status: RESOLVED | Noted: 2025-07-22 | Resolved: 2025-07-22

## 2025-07-22 PROBLEM — I34.0 MITRAL REGURGITATION: Status: ACTIVE | Noted: 2025-07-22

## 2025-07-22 PROBLEM — Z98.61 S/P PTCA (PERCUTANEOUS TRANSLUMINAL CORONARY ANGIOPLASTY): Status: ACTIVE | Noted: 2025-07-22

## 2025-07-22 PROBLEM — I50.20 SYSTOLIC HEART FAILURE: Status: RESOLVED | Noted: 2025-07-22 | Resolved: 2025-07-22

## 2025-07-22 PROBLEM — I10 ESSENTIAL (PRIMARY) HYPERTENSION: Status: ACTIVE | Noted: 2024-03-06

## 2025-07-22 PROBLEM — N40.0 ENLARGED PROSTATE: Status: RESOLVED | Noted: 2025-07-22 | Resolved: 2025-07-22

## 2025-07-22 PROBLEM — E55.9 VITAMIN D DEFICIENCY: Status: RESOLVED | Noted: 2023-12-01 | Resolved: 2025-07-22

## 2025-07-22 PROBLEM — N52.9 ERECTILE DYSFUNCTION: Status: RESOLVED | Noted: 2025-07-22 | Resolved: 2025-07-22

## 2025-07-22 PROBLEM — F41.9 ANXIETY: Status: RESOLVED | Noted: 2025-07-22 | Resolved: 2025-07-22

## 2025-07-22 PROBLEM — K62.1 ANORECTAL POLYP: Status: RESOLVED | Noted: 2025-07-22 | Resolved: 2025-07-22

## 2025-07-22 PROBLEM — E78.00 HYPERCHOLESTEROLEMIA: Status: ACTIVE | Noted: 2024-05-06

## 2025-07-22 PROBLEM — Z98.890 HISTORY OF CARDIOVASCULAR SURGERY: Status: RESOLVED | Noted: 2024-08-26 | Resolved: 2025-07-22

## 2025-07-22 PROBLEM — K52.9 COLITIS: Status: RESOLVED | Noted: 2024-01-25 | Resolved: 2025-07-22

## 2025-07-22 PROBLEM — F32.9 MAJOR DEPRESSIVE DISORDER, SINGLE EPISODE, UNSPECIFIED: Status: RESOLVED | Noted: 2025-07-22 | Resolved: 2025-07-22

## 2025-07-22 PROBLEM — F17.200 CURRENT SMOKER: Status: RESOLVED | Noted: 2025-07-22 | Resolved: 2025-07-22

## 2025-07-22 PROBLEM — I71.43 INFRARENAL ABDOMINAL AORTIC ANEURYSM, WITHOUT RUPTURE: Status: RESOLVED | Noted: 2024-10-23 | Resolved: 2025-07-22

## 2025-07-22 PROBLEM — I71.21 ANEURYSM OF THE ASCENDING AORTA, WITHOUT RUPTURE: Status: ACTIVE | Noted: 2024-03-06

## 2025-07-22 PROBLEM — Z11.52 ENCOUNTER FOR SCREENING FOR COVID-19: Status: RESOLVED | Noted: 2024-09-06 | Resolved: 2025-07-22

## 2025-07-22 PROBLEM — I65.23 OCCLUSION AND STENOSIS OF BILATERAL CAROTID ARTERIES: Status: RESOLVED | Noted: 2025-07-22 | Resolved: 2025-07-22

## 2025-07-22 PROBLEM — E55.9 VITAMIN D DEFICIENCY: Status: RESOLVED | Noted: 2025-07-22 | Resolved: 2025-07-22

## 2025-07-22 PROBLEM — R91.1 SOLITARY PULMONARY NODULE: Status: RESOLVED | Noted: 2024-07-03 | Resolved: 2025-07-22

## 2025-07-22 PROBLEM — K62.0 ANORECTAL POLYP: Status: RESOLVED | Noted: 2025-07-22 | Resolved: 2025-07-22

## 2025-07-22 RX ORDER — FAMOTIDINE 40 MG/1
40 TABLET, FILM COATED ORAL DAILY
COMMUNITY
Start: 2025-05-28

## 2025-07-22 RX ORDER — DIPHENOXYLATE HCL/ATROPINE 2.5-.025MG
1 TABLET ORAL 4 TIMES DAILY PRN
COMMUNITY
Start: 2025-05-06

## 2025-07-22 RX ORDER — TAMSULOSIN HYDROCHLORIDE 0.4 MG/1
0.4 CAPSULE ORAL DAILY
COMMUNITY
Start: 2025-06-23

## 2025-07-22 NOTE — PROGRESS NOTES
Referral from Dr. Leblanc. Anurag comes to discuss treatment recommendations for dilated ascending aorta with trileaflet aortic valve mild aortic regurgitation moderate mitral regurgitation. History of cad s/p coronary stenting lvf 30-35%. History of heart block, colitis and family history of aorta disease borther and father. Fartun Owens RN

## 2025-07-23 ENCOUNTER — OFFICE VISIT (OUTPATIENT)
Dept: CARDIAC SURGERY | Facility: HOSPITAL | Age: 75
End: 2025-07-23
Payer: MEDICARE

## 2025-07-23 VITALS
BODY MASS INDEX: 22.29 KG/M2 | HEART RATE: 70 BPM | HEIGHT: 67 IN | DIASTOLIC BLOOD PRESSURE: 62 MMHG | SYSTOLIC BLOOD PRESSURE: 95 MMHG | OXYGEN SATURATION: 100 % | WEIGHT: 142 LBS

## 2025-07-23 DIAGNOSIS — I77.819 DILATATION OF AORTA: Primary | ICD-10-CM

## 2025-07-23 DIAGNOSIS — I71.21 ANEURYSM OF ASCENDING AORTA WITHOUT RUPTURE: ICD-10-CM

## 2025-07-23 PROCEDURE — 99212 OFFICE O/P EST SF 10 MIN: CPT

## 2025-07-23 PROCEDURE — 3074F SYST BP LT 130 MM HG: CPT | Performed by: THORACIC SURGERY (CARDIOTHORACIC VASCULAR SURGERY)

## 2025-07-23 PROCEDURE — 99024 POSTOP FOLLOW-UP VISIT: CPT | Performed by: THORACIC SURGERY (CARDIOTHORACIC VASCULAR SURGERY)

## 2025-07-23 PROCEDURE — 3078F DIAST BP <80 MM HG: CPT | Performed by: THORACIC SURGERY (CARDIOTHORACIC VASCULAR SURGERY)

## 2025-07-23 PROCEDURE — 3008F BODY MASS INDEX DOCD: CPT | Performed by: THORACIC SURGERY (CARDIOTHORACIC VASCULAR SURGERY)

## 2025-07-23 PROCEDURE — 1126F AMNT PAIN NOTED NONE PRSNT: CPT | Performed by: THORACIC SURGERY (CARDIOTHORACIC VASCULAR SURGERY)

## 2025-07-23 PROCEDURE — 1159F MED LIST DOCD IN RCRD: CPT | Performed by: THORACIC SURGERY (CARDIOTHORACIC VASCULAR SURGERY)

## 2025-07-23 ASSESSMENT — ENCOUNTER SYMPTOMS
OCCASIONAL FEELINGS OF UNSTEADINESS: 0
LOSS OF SENSATION IN FEET: 0
DEPRESSION: 0

## 2025-07-23 ASSESSMENT — PATIENT HEALTH QUESTIONNAIRE - PHQ9
1. LITTLE INTEREST OR PLEASURE IN DOING THINGS: NOT AT ALL
2. FEELING DOWN, DEPRESSED OR HOPELESS: NOT AT ALL
SUM OF ALL RESPONSES TO PHQ9 QUESTIONS 1 AND 2: 0

## 2025-07-23 ASSESSMENT — PAIN SCALES - GENERAL: PAINLEVEL_OUTOF10: 0-NO PAIN

## 2025-08-04 ENCOUNTER — APPOINTMENT (OUTPATIENT)
Dept: RADIOLOGY | Facility: CLINIC | Age: 75
End: 2025-08-04
Payer: MEDICARE

## 2025-08-04 DIAGNOSIS — I77.819 DILATATION OF AORTA: ICD-10-CM

## 2025-08-04 PROCEDURE — 2550000001 HC RX 255 CONTRASTS: Performed by: THORACIC SURGERY (CARDIOTHORACIC VASCULAR SURGERY)

## 2025-08-04 PROCEDURE — 71275 CT ANGIOGRAPHY CHEST: CPT | Performed by: INTERNAL MEDICINE

## 2025-08-04 PROCEDURE — 71275 CT ANGIOGRAPHY CHEST: CPT

## 2025-08-04 RX ADMIN — IOHEXOL 75 ML: 350 INJECTION, SOLUTION INTRAVENOUS at 13:49

## 2026-01-09 ENCOUNTER — APPOINTMENT (OUTPATIENT)
Dept: PHARMACY | Facility: HOSPITAL | Age: 76
End: 2026-01-09
Payer: MEDICARE

## (undated) DEVICE — SHEATH, PINNACLE, 10 CM,  7FR INTRODUCER, 7FR DIA, 2.5 CM DIALATOR

## (undated) DEVICE — SHEATH, GLIDESHEATH, SLENDER, 6FR 10CM

## (undated) DEVICE — GUIDEWIRE, INQWIRE, 3MM J, .035 X 210CM, FIXED

## (undated) DEVICE — ACCESS KIT, S-MAK MINI, 4FR 10CM 0.018IN 40CM, NT/PT, ECHO ENHANCE NEEDLE

## (undated) DEVICE — INTRODUCER SYSTEM, PRELUDE SNAP, SPLITTABLE, HEMOSTATIC, 7FR

## (undated) DEVICE — CABLE, SURGICAL, SM CLIP

## (undated) DEVICE — CATHETER, THERMODILUTION, SWAN GANZ, VIP, 5 LUMEN, 7.5 FR, 110 CM

## (undated) DEVICE — CATHETER, OPTITORQUE, 5FR, TIG, 1H/100CM

## (undated) DEVICE — ENVELOPE, ANTIBACTERIAL, AIGIS RX TYRX, ABSORBABLE, LRG

## (undated) DEVICE — INTRODUCER SYSTEM, PRELUDE SNAP, SPLITTABLE, 9 FR X 13 CM

## (undated) DEVICE — MONITOR, MYCARELINK, PATIENT MONITOR, 24960

## (undated) DEVICE — PAD, ELECTRODE DEFIB PADPRO ADULT STRL W/ADAPTER

## (undated) DEVICE — TR BAND, RADIAL COMPRESSION, STANDARD, 24CM